# Patient Record
Sex: FEMALE | Race: AMERICAN INDIAN OR ALASKA NATIVE | Employment: OTHER | URBAN - METROPOLITAN AREA
[De-identification: names, ages, dates, MRNs, and addresses within clinical notes are randomized per-mention and may not be internally consistent; named-entity substitution may affect disease eponyms.]

---

## 2017-01-01 ENCOUNTER — OFFICE VISIT (OUTPATIENT)
Dept: CARDIOLOGY CLINIC | Age: 67
End: 2017-01-01

## 2017-01-01 DIAGNOSIS — Z95.810 PRESENCE OF AUTOMATIC CARDIOVERTER/DEFIBRILLATOR (AICD): Primary | ICD-10-CM

## 2017-01-01 RX ORDER — CARVEDILOL 6.25 MG/1
6.25 TABLET ORAL 2 TIMES DAILY WITH MEALS
Qty: 60 TAB | Refills: 6 | Status: SHIPPED | OUTPATIENT
Start: 2017-01-01 | End: 2018-01-01 | Stop reason: SDUPTHER

## 2017-01-01 RX ORDER — SERTRALINE HYDROCHLORIDE 50 MG/1
TABLET, FILM COATED ORAL
Qty: 90 TAB | Refills: 4 | Status: SHIPPED | OUTPATIENT
Start: 2017-01-01

## 2017-01-01 RX ORDER — SPIRONOLACTONE 25 MG/1
25 TABLET ORAL DAILY
Qty: 30 TAB | Refills: 6 | Status: CANCELLED | OUTPATIENT
Start: 2017-01-01

## 2017-01-20 ENCOUNTER — HOSPITAL ENCOUNTER (OUTPATIENT)
Dept: GENERAL RADIOLOGY | Age: 67
Discharge: HOME | End: 2017-01-20
Payer: MEDICARE

## 2017-01-20 ENCOUNTER — OFFICE VISIT (OUTPATIENT)
Dept: CARDIOLOGY CLINIC | Age: 67
End: 2017-01-20

## 2017-01-20 ENCOUNTER — CLINICAL SUPPORT (OUTPATIENT)
Dept: CARDIOLOGY CLINIC | Age: 67
End: 2017-01-20

## 2017-01-20 VITALS
WEIGHT: 177 LBS | BODY MASS INDEX: 32.57 KG/M2 | DIASTOLIC BLOOD PRESSURE: 60 MMHG | HEART RATE: 72 BPM | HEIGHT: 62 IN | SYSTOLIC BLOOD PRESSURE: 88 MMHG

## 2017-01-20 DIAGNOSIS — Z95.810 PRESENCE OF AUTOMATIC CARDIOVERTER/DEFIBRILLATOR (AICD): Primary | ICD-10-CM

## 2017-01-20 DIAGNOSIS — I42.8 NON-ISCHEMIC CARDIOMYOPATHY (HCC): ICD-10-CM

## 2017-01-20 DIAGNOSIS — I10 ESSENTIAL HYPERTENSION: ICD-10-CM

## 2017-01-20 DIAGNOSIS — I42.9 IDIOPATHIC CARDIOMYOPATHY (HCC): ICD-10-CM

## 2017-01-20 DIAGNOSIS — Z79.01 CHRONIC ANTICOAGULATION: ICD-10-CM

## 2017-01-20 DIAGNOSIS — T82.110A PACEMAKER LEAD MALFUNCTION, INITIAL ENCOUNTER: ICD-10-CM

## 2017-01-20 DIAGNOSIS — I44.7 LEFT BUNDLE BRANCH BLOCK (LBBB) ON ELECTROCARDIOGRAM: ICD-10-CM

## 2017-01-20 DIAGNOSIS — Z95.810 BIVENTRICULAR AUTOMATIC IMPLANTABLE CARDIOVERTER DEFIBRILLATOR IN SITU: Primary | ICD-10-CM

## 2017-01-20 DIAGNOSIS — Z79.899 ON AMIODARONE THERAPY: ICD-10-CM

## 2017-01-20 DIAGNOSIS — I48.0 PAF (PAROXYSMAL ATRIAL FIBRILLATION) (HCC): ICD-10-CM

## 2017-01-20 DIAGNOSIS — I50.22 CHF NYHA CLASS II (SYMPTOMS WITH MODERATELY STRENUOUS ACTIVITIES), CHRONIC, SYSTOLIC (HCC): ICD-10-CM

## 2017-01-20 PROCEDURE — 71020 XR CHEST PA LAT: CPT

## 2017-01-20 NOTE — PROGRESS NOTES
Subjective:       CARDIAC ELECTROPHYSIOLOGY CLINIC    Martine Olguin is a 73 y.o. female who is seen for evaluation of paroxysmal atrial fibrillation, chronic idiopathic cardiomyopathy EF 30% in the past but last year in 2/2015 she had abnormal nuclear stress test with EF 31% and echo EF 38% she had cardiac cath with Dr Toni López and she had normal coronary arteries and LVEF 25%. She had IVCD/LBBB type  She then had a new atrial lead removal (atrial lead noise known for a long time) and BIV ICD upgrade (new LV lead) 6/21/16. She says she has been doing well. No recent hospitalizations for CHF. No LE edema, abdominal bloating, chest pain or palpitations. She takes amiodarone, she admits she has not had a eye exam.   She takes xarelto for embolic CVA prevention.  She denies blood in the stool or urine      Cardiac testing   Cardiac cath 2/26/15 Normal coronaries, severely reduced LV function estimated EF 25%          Lexican cardiolite stress test 2/19/2015 showed atrial pacing, ventricular sensing with IVCD, diffuse nonspecific STT wave changes and PVCs  Myocardial perfusion images showed a large but partially reversible ischemia in anterior, anteroseptal and anterolateral walls consistent with ischemia in the LAD territory  LVEF 31%                           Problem List  Date Reviewed: 10/13/2016          Codes Class Noted    Pacemaker lead malfunction ICD-10-CM: T82.110A  ICD-9-CM: 996.01  6/21/2016        Chronic systolic congestive heart failure, NYHA class 3 ([facility] [address] ICD-10-CM: I50.22  ICD-9-CM: 428.22, 428.0  6/21/2016        Biventricular ICD (implantable cardioverter-defibrillator) in place ICD-10-CM: Z95.810  ICD-9-CM: V45.02  6/21/2016    Overview Addendum 6/21/2016  7:44 AM by Corina Noguera MD     Upgrade from ICD [facility] [employer]  Right atrial lead extraction and reimplant 6/21/2016             Left bundle branch block (LBBB) on electrocardiogram ICD-10-CM: I44.7  ICD-9-CM: 426.3  6/21/2016        Advanced care planning/counseling discussion ICD-10-CM: Z71.89  ICD-9-CM: V65.49  6/2/2016    Overview Signed 6/2/2016 10:06 AM by Jaime Bosch MD     Discussed naming a decision maker. Patient not able to make a decision at this time. Depression ICD-10-CM: F32.9  ICD-9-CM: 284  8/28/2015        Hypertension ICD-10-CM: I10  ICD-9-CM: 401.9  8/28/2015        History of amiodarone therapy ICD-10-CM: Z92.29  ICD-9-CM: V87.49  12/30/2011        Idi[facility] ICD-10-CM: I42.8  ICD-9-CM: 425.4  Unknown    Overview Addendum 11/22/2016  1:01 PM by Jaime Bosch MD     Non ischemic by cath 3/15. EF 21% 11/16 by echo             Automatic implantable cardiac defibrillator in situ ICD-10-CM: Z95.810  ICD-9-CM: V45.02  Unknown    Overview Addendum 7/30/2012  8:51 AM by Cortney Davila MD     ICD DFT test at 26 J converted VF to NSR successfully (she is on Amiodarone) 5/13/2011  Lead noises with isometric arm activity. Fluoroscopy 7/30/2012 does not show fracture of disconnection             Paroxysmal ventricular tachycardia (HCC) ICD-10-CM: I47.2  ICD-9-CM: 427.1  Unknown        PAF (paroxysmal atrial fibrillation) ([facility] ICD-10-CM: I48.0  ICD-9-CM: 427.31  Unknown    Overview Addendum 4/2/2011  3:42 PM by Cortney Davila MD     ICD shock 3/28/2011 for rapid Afib  ICD shock 6/3/2010  (ICD with ATP for VT in the past)                 Current Outpatient Prescriptions   Medication Sig    losartan (COZAAR) 25 mg tablet Take 1 Tab by mouth daily.  carvedilol (COREG) 6.25 mg tablet Take 1 Tab by mouth two (2) times daily (with meals).  furosemide (LASIX) 20 mg tablet TAKE 1 TABLET BY MOUTH DAILY    amiodarone (CORDARONE) 200 mg tablet Take 1 Tab by mouth daily.  spironolactone (ALDACTONE) 25 mg tablet Take 1 Tab by mouth daily.  Indications: CHRONIC HEART FAILURE    sertraline (ZOLOFT) 50 mg tablet TAKE 1 TABLET BY MOUTH DAILY    XARELTO 20 mg tab tablet TAKE 1 TABLET BY MOUTH EVERY DAY     No current facility-administered medications for this visit. No Known Allergies  Past Medical History   Diagnosis Date    Congestive heart failure, unspecified     Cardiomyopathy, nonischemic     Atrial fibrillation     ICD (implantable cardiac defibrillator) in place 9/18/09     [facility] DR Kenya Tovar History of amiodarone therapy 12/30/2011    ICD (implantable cardioverter-defibrillator) in place      Past Surgical History   Procedure Laterality Date    Hx heart catheterization  2006     no CAD, EF 25%    Echo 2d adult  4/1/2011     EF 30%, mild to moderate LA enlargement, mild MR    Ir fluoroscopy < 1 hour  7/30/2012          Hx pacemaker placement  2009    Hx pacemaker         History   Substance Use Topics    Smoking status: Never Smoker     Smokeless tobacco: Never Used    Alcohol Use: 2.5 oz/week     5 Cans of beer per week      Comment: occasional      Review of Systems    Constitutional: Negative for fever, chills, weight loss, malaise/fatigue  HEENT: Negative for nosebleeds, and vision changes. + runny nose  Respiratory: Negative for cough, hemoptysis, sputum production, and wheezing. Cardiovascular: Negative for chest pain, palpitations, leg swelling, no syncope, and PND. Gastrointestinal: Negative for nausea, vomiting, blood in stool and melena. Genitourinary: Negative for hematuria. Musculoskeletal: Negative for myalgias. Skin: Negative for rash and itching. Heme: Does not bleed or bruise easily.    Neurological: Negative for speech change and focal weakness      Objective:     Visit Vitals    BP (!) 88/60 (BP 1 Location: Right arm, BP Patient Position: Sitting)    Pulse 72    Ht 5' 2\" (1.575 m)    Wt 177 lb (80.3 kg)    BMI 32.37 kg/m2       Physical Exam:   General:  alert, cooperative, no distress, appears stated age   Neck: carotids upstroke normal bilaterally, no bruits, no JVD   Chest Wall: inspection showed normal left sided BIV ICD site   Lung: clear to auscultation bilaterally   Heart:  normal rate, regular rhythm,no rubs, clicks or gallops   Abdomen: soft, mild obesity. Extremities: no edema   Skin: left sided biventircular ICD site unremarkable     ECG atrial sensing and ventricular pacing    Assessment/Plan:       ICD-10-CM ICD-9-CM    1. Biventricular automatic implantable cardioverter defibrillator in situ Z95.810 V45.02    2. PAF (paroxysmal atrial fibrillation) ([facility]) I48.0 427.31 AMB POC EKG ROUTINE W/ 12 LEADS, INTER & REP      XR CHEST PA LAT   3. Idiopathic cardiomyopathy ([facility]) I42.8 425.4 AMB POC EKG ROUTINE W/ 12 LEADS, INTER & REP      XR CHEST PA LAT   4. Essential hypertension I10 401.9 AMB POC EKG ROUTINE W/ 12 LEADS, INTER & REP      XR CHEST PA LAT   5. Left bundle branch block (LBBB) on electrocardiogram I44.7 426.3    6. On amiodarone therapy Z79.899 V58.69    7. Chronic anticoagulation Z79.01 V58.61    8. Non-ischemic cardiomyopathy ([facility]) I42.9 425.4    9. CHF NYHA class II (symptoms with moderately strenuous activities), chronic, systolic ([facility]) O74.56 260.19      428.0    s/p upgrade to biventricular ICD. no acute s/S of CHF. She is on GDMT, BP low but not dizzy  She is tolerating xarelto without bleeding side effects. She is on amiodarone and tolerating without side effects. TSh normal  LFTs normal  Echo with still depressed LVEF  Chest Xray 6/2016 no evidence of pulmonary fibrosis. Will need to repeat today but also since atrial lead measures small P waves and has high pacing threshold 4.5 V @ 0.5 ms will check RA lead position and I reprogrammed BIV ICD to VDD pacing mode  She will make an eye appointment annually  Reviewed medications and side effects in detail with her  Call me if she has dizziness       Follow-up Disposition:  Return in about 6 months (around 7/20/2017). Julian Shelton M.D.  [facility]  Electrophysiology/Cardiology  [facility] [place]  [address], [phone] [facility] Fabienne Calderon, [address] [place] [address]  [phone]

## 2017-01-20 NOTE — PROGRESS NOTES
Chief Complaint   Patient presents with    Cardiomyopathy    CHF    Irregular Heart Beat     a-fib    Follow-up     3 month follow up     Verified medications with the patient.

## 2017-01-20 NOTE — PATIENT INSTRUCTIONS
Have chest x-ray done to evaluate your amiodarone use, atrial lead position. Follow up with Dr. Heron Hearn in 6 months.

## 2017-01-23 ENCOUNTER — TELEPHONE (OUTPATIENT)
Dept: CARDIOLOGY CLINIC | Age: 67
End: 2017-01-23

## 2017-01-23 NOTE — TELEPHONE ENCOUNTER
Wanted to know that results of her chest x-ray. Notified her that I would contact her once Dr Ruthie Esteban reviewed her results.

## 2017-01-25 ENCOUNTER — TELEPHONE (OUTPATIENT)
Dept: CARDIOLOGY CLINIC | Age: 67
End: 2017-01-25

## 2017-01-25 DIAGNOSIS — I42.9 IDIOPATHIC CARDIOMYOPATHY (HCC): Primary | ICD-10-CM

## 2017-01-25 DIAGNOSIS — I48.0 PAF (PAROXYSMAL ATRIAL FIBRILLATION) (HCC): ICD-10-CM

## 2017-01-25 DIAGNOSIS — T82.110A PACEMAKER LEAD MALFUNCTION, INITIAL ENCOUNTER: ICD-10-CM

## 2017-01-25 NOTE — PROGRESS NOTES
New right atrial lead placed in June 2016 is dislodged and this has been 7 months from implant  Please ask patient to come in for treadmill test to see if she has chronotropic incompetence and if so will need to reposition the atrial lead

## 2017-01-25 NOTE — TELEPHONE ENCOUNTER
Verified patient with two types of identifiers. Notified wife and  of results. PSR to call back and schedule treadmill test to check for CI. Patient verbalizes understanding. And will call with any questions or concerns.

## 2017-01-25 NOTE — TELEPHONE ENCOUNTER
----- Message from Navya Bianchi MD sent at 1/25/2017  8:31 AM EST -----  New right atrial lead placed in June 2016 is dislodged and this has been 7 months from implant  Please ask patient to come in for treadmill test to see if she has chronotropic incompetence and if so will need to reposition the atrial lead

## 2017-02-06 ENCOUNTER — DOCUMENTATION ONLY (OUTPATIENT)
Dept: CARDIOLOGY CLINIC | Age: 67
End: 2017-02-06

## 2017-02-06 ENCOUNTER — CLINICAL SUPPORT (OUTPATIENT)
Dept: CARDIOLOGY CLINIC | Age: 67
End: 2017-02-06

## 2017-02-06 DIAGNOSIS — I48.0 PAF (PAROXYSMAL ATRIAL FIBRILLATION) (HCC): ICD-10-CM

## 2017-02-06 DIAGNOSIS — I42.9 IDIOPATHIC CARDIOMYOPATHY (HCC): ICD-10-CM

## 2017-02-06 DIAGNOSIS — T82.110A PACEMAKER LEAD MALFUNCTION, INITIAL ENCOUNTER: ICD-10-CM

## 2017-02-06 DIAGNOSIS — I47.29 PAROXYSMAL VENTRICULAR TACHYCARDIA: ICD-10-CM

## 2017-02-06 NOTE — PROGRESS NOTES
RA lead was dislodged on xray  Stress test showed appropriate sinus response but she had PVCs and 4 beat NSVT.     I recommend to reposition RA lead before it falls into RV

## 2017-02-07 ENCOUNTER — TELEPHONE (OUTPATIENT)
Dept: CARDIOLOGY CLINIC | Age: 67
End: 2017-02-07

## 2017-02-07 DIAGNOSIS — I47.29 PAROXYSMAL VENTRICULAR TACHYCARDIA: ICD-10-CM

## 2017-02-07 DIAGNOSIS — I48.0 PAF (PAROXYSMAL ATRIAL FIBRILLATION) (HCC): ICD-10-CM

## 2017-02-07 DIAGNOSIS — I10 ESSENTIAL HYPERTENSION: ICD-10-CM

## 2017-02-07 DIAGNOSIS — I42.9 IDIOPATHIC CARDIOMYOPATHY (HCC): Primary | ICD-10-CM

## 2017-02-07 NOTE — PROGRESS NOTES
Procedure scheduled for 2/22/17 at 1:00pm.  Patient has a pending appt 2/9/17 with Dr Jodie Sneed. Will give prep and info at appt.

## 2017-02-07 NOTE — LETTER
2/7/2017 9:10 AM 
 
Ms. Quin Garcia [address] [facility] [address] [place] [address] Your lead repositoining procedure has been scheduled for 2/22/17 at 1:00pm, at [facility]. 
 
Please report to Admitting Department by 11:00am, or 2 hours prior to your scheduled procedure. Please bring a list of your current medications and medication bottles, if able, to the hospital on this day. You will need to have nothing to eat or drink after midnight, the night prior to your procedure. You may have small sips of water, if needed, to take with your medication. You will need labs drawn prior to your procedure. Please go to [facility] to have this done as soon as your able. After your procedure, you will need to follow up with Dr. Birt Saint.  Your follow-up appointment has been scheduled for 3/10/17 at 9:45am.  
 
 
 
 
 
 
 
 
Sincerely, 
 
 
Desmond Munguia MD

## 2017-02-09 ENCOUNTER — OFFICE VISIT (OUTPATIENT)
Dept: CARDIOLOGY CLINIC | Age: 67
End: 2017-02-09

## 2017-02-09 VITALS
BODY MASS INDEX: 32.76 KG/M2 | DIASTOLIC BLOOD PRESSURE: 60 MMHG | WEIGHT: 178 LBS | HEART RATE: 60 BPM | HEIGHT: 62 IN | SYSTOLIC BLOOD PRESSURE: 100 MMHG

## 2017-02-09 DIAGNOSIS — T82.120A ATRIAL PACEMAKER LEAD DISPLACEMENT, INITIAL ENCOUNTER: Primary | ICD-10-CM

## 2017-02-09 DIAGNOSIS — I42.9 IDIOPATHIC CARDIOMYOPATHY (HCC): ICD-10-CM

## 2017-02-09 DIAGNOSIS — I48.0 PAF (PAROXYSMAL ATRIAL FIBRILLATION) (HCC): ICD-10-CM

## 2017-02-09 DIAGNOSIS — Z95.810 BIVENTRICULAR AUTOMATIC IMPLANTABLE CARDIOVERTER DEFIBRILLATOR IN SITU: ICD-10-CM

## 2017-02-09 NOTE — MR AVS SNAPSHOT
Visit Information Date & Time Provider Department Dept. Phone Encounter #  
 2/9/2017  9:40 AM Kiley Salazar MD [facility] Doreen Dennis [phone] [id number] Your Appointments 2/23/2017  8:15 AM  
ROUTINE CARE with Darshan Mitchell MD  
[address] ([address]) Appt Note: checkup [address], [address] [id number]  
[address], [place] [address] [address]  
  
    
 3/10/2017  9:45 AM  
PACEMAKER with PrimTyler Bond [facility] (ANTHONY SCHEDULING) Appt Note: 2 week wound check from RA lead repo  
 [address] [facility] [address]  
[address]  
  
    
 3/10/2017 10:00 AM  
ESTABLISHED PATIENT with Kiley Salazar MD  
[facility] ([address] [place] [address]) Appt Note: 2 week wound check from RA lead repo  
 [address] [facility] [address]  
[address]  
  
    
 7/14/2017  9:20 AM  
ESTABLISHED PATIENT with Kiley Salazar MD  
[facility] ([address] [place] [address] Road) Appt Note: 6 month f/u per Dr. Nanette Wild [facility] [phone]  
[phone]  
  
    
 2/9/2018 10:15 AM  
PACEMAKER with IQZTyler MCKEON [facility] (ANTHONY SCHEDULING) Appt Note: bsc icd, rc, annual thresh/Lat  see Kingsley Thurman, $0CP  martinez 1/20/17  
 [address]  
[phone]  
  
    
 2/9/2018 10:20 AM  
ESTABLISHED PATIENT with Kiley Salazar MD  
[facility] ([address] [place] [address] Road) Appt Note: bsc icd, rc, annual thresh/Lat  see Kingsley Thurman, $0CP  martinez 1/20/17  
 [address]  
[phone]  
  
    
  
 4/25/2017 10:30 AM  
REMOTE OFFICE VISIT with Bland Carrel  
 [facility] (ANTHONY SCHEDULING) Appt Note: [facility] icd, rc b 1/20/17  
 [address] [address]  
[address] [address]  
  
    
 8/1/2017  9:30 AM  
REMOTE OFFICE VISIT with Jyotsna Ta [facility] (ANTHONY SCHEDULING) Appt Note: bs icd, rc  
 [address] [address], [place]  
[phone]  
  
    
 11/7/2017  8:00 AM  
REMOTE OFFICE VISIT with Jyotsna Ta [facility] ([place] SCHEDULING) Appt Note: [facility] icd, rc  
 [address] [address], [place]  
[phone] Upcoming Health Maintenance Date Due DTaP/Tdap/Td series (1 - Tdap) 3/21/1971 GLAUCOMA SCREENING Q2Y 3/21/2016 OSTEOPOROSIS SCREENING (DEXA) 3/21/2016 INFLUENZA AGE 9 TO ADULT 8/1/2016 FOBT Q 1 YEAR AGE 50-75 3/16/2017 BREAST CANCER SCRN MAMMOGRAM 6/9/2017 Pneumococcal 65+ Low/Medium Risk (2 of 2 - PCV13) 6/2/2017 MEDICARE YEARLY EXAM 6/3/2017 Allergies as of 2/9/2017  Review Complete On: 2/9/2017 By: Pavel Barger LPN No Known Allergies Current Immunizations  Never Reviewed Name Date Pneumococcal Polysaccharide (PPSV-23) 6/2/2016 Not reviewed this visit Vitals BP Pulse Height(growth percentile) Weight(growth percentile) BMI OB Status 100/60 (BP 1 Location: Right arm, BP Patient Position: Sitting) 60 5' 2\" (1.575 m) 178 lb (80.7 kg) 32.56 kg/m2 Postmenopausal  
 Smoking Status Never Smoker Vitals History BMI and BSA Data Body Mass Index Body Surface Area 32.56 kg/m 2 1.88 m 2 Preferred Pharmacy Pharmacy Name Phone [facility] [address] [phone] Your Updated Medication List  
  
   
This list is accurate as of: 2/9/17 10:18 AM.  Always use your most recent med list.  
  
  
  
  
 amiodarone 200 mg tablet Commonly known as:  CORDARONE Take 1 Tab by mouth daily. carvedilol 6.25 mg tablet Commonly known as:  Gillermina Begun Take 1 Tab by mouth two (2) times daily (with meals). furosemide 20 mg tablet Commonly known as:  LASIX TAKE 1 TABLET BY MOUTH DAILY losartan 25 mg tablet Commonly known as:  COZAAR Take 1 Tab by mouth daily. sertraline 50 mg tablet Commonly known as:  ZOLOFT  
TAKE 1 TABLET BY MOUTH DAILY  
  
 spironolactone 25 mg tablet Commonly known as:  ALDACTONE Take 1 Tab by mouth daily. Indications: CHRONIC HEART FAILURE  
  
 XARELTO 20 mg Tab tablet Generic drug:  rivaroxaban TAKE 1 TABLET BY MOUTH EVERY DAY To-Do List   
 02/22/2017 1:15 PM  
  Appointment with CATH ROOM 3 [facility] at [address] [place] [address] ([phone]) NPO AFTER MIDNIGHT! ROUTINE CASES:  Please arrive 2 hour prior to your scheduled appointment time. If your scheduled appointment is for 0730, 0800, 0815, please arrive by 0645. NON ROUTINE CASES:  PATIENTS WHO REQUIRE LABS, X-RAY, EKG, or MEDS:  PLEASE ARRIVE 3 HOURS PRIOR TO YOUR SCHEDULED APPOINTMENT. If you require hydration prior to your procedure, PLEASE ARRIVE 4 HOURS PRIOR TO YOUR APPOINTMENT  **** IT IS THE OFFICE SCHEDULARS RESPONSBILITY TO NOTIFY THE CATH LAB SCHEDULAR IF THE PATIENT WILL REQUIRE ANY ADDITIONAL TIME FOR PREP FROM ROUTINE CASE ***** Patient Instructions Continue with dental procedure for tomorrow. Proceed with RA lead replacement, hold Xarelto 2 days prior to procedure. Introducing [facility] & HEALTH SERVICES! [facility] introduces [employer] patient portal. Now you can access parts of your medical record, email your doctor's office, and request medication refills online. 1. In your internet browser, go to [email or web address] 2. Click on the First Time User? Click Here link in the Sign In box. You will see the New Member Sign Up page. 3. Enter your [employer] Access Code exactly as it appears below. You will not need to use this code after youve completed the sign-up process. If you do not sign up before the expiration date, you must request a new code. · [employer] Access Code: [email or web address] Expires: 4/20/2017 10:29 AM 
 
4. Enter the last four digits of your Social Security Number (xxxx) and Date of Birth (mm/dd/yyyy) as indicated and click Submit. You will be taken to the next sign-up page. 5. Create a [employer] ID. This will be your [employer] login ID and cannot be changed, so think of one that is secure and easy to remember. 6. Create a [employer] password. You can change your password at any time. 7. Enter your Password Reset Question and Answer. This can be used at a later time if you forget your password. 8. Enter your e-mail address. You will receive e-mail notification when new information is available in [address] 9. Click Sign Up. You can now view and download portions of your medical record. 10. Click the Download Summary menu link to download a portable copy of your medical information. If you have questions, please visit the Frequently Asked Questions section of the [employer] website. Remember, [employer] is NOT to be used for urgent needs. For medical emergencies, dial 911. Now available from your iPhone and Android! Please provide this summary of care documentation to your next provider. Your primary care clinician is listed as Gila Snell. If you have any questions after today's visit, please call [phone].

## 2017-02-09 NOTE — PATIENT INSTRUCTIONS
Continue with dental procedure for tomorrow. Proceed with RA lead replacement, hold Xarelto 2 days prior to procedure.

## 2017-02-09 NOTE — PROGRESS NOTES
Chief Complaint   Patient presents with    Cardiomyopathy    Irregular Heart Beat    Pre-op Exam     Verified medications with the patient. Verified pharmacy with patient.

## 2017-02-09 NOTE — PROGRESS NOTES
Subjective:       CARDIAC ELECTROPHYSIOLOGY CLINIC    Eber Abel is a 73 y.o. female who is seen for evaluation of right atrial pacing lead  RA lead was dislodged on xray  Stress test showed appropriate sinus response but she had PVCs and 4 beat NSVT. She has paroxysmal atrial fibrillation, chronic idiopathic cardiomyopathy EF 30% in the past but last year in 2/2015 she had abnormal nuclear stress test with EF 31% and echo EF 38% she had cardiac cath with Dr Cy Calderon and she had normal coronary arteries and LVEF 25%. She had IVCD/LBBB type  She then had a new atrial lead removal (atrial lead noise known for a long time) and BIV ICD upgrade (new LV lead) 6/21/16. She says she has been doing well. No recent hospitalizations for CHF. No LE edema, abdominal bloating, chest pain or palpitations. She takes amiodarone, she admits she has not had a eye exam.   She takes xarelto for embolic CVA prevention.  She denies blood in the stool or urine      Cardiac testing   Cardiac cath 2/26/15 Normal coronaries, severely reduced LV function estimated EF 25%          [facility] cardiolite stress test 2/19/2015 showed atrial pacing, ventricular sensing with IVCD, diffuse nonspecific STT wave changes and PVCs  Myocardial perfusion images showed a large but partially reversible ischemia in anterior, anteroseptal and anterolateral walls consistent with ischemia in the LAD territory  LVEF 31%                           Problem List  Date Reviewed: 2/9/2017          Codes Class Noted    Pacemaker lead malfunction ICD-10-CM: T82.110A  ICD-9-CM: 996.01  6/21/2016        Chronic systolic congestive heart failure, NYHA class 3 ([facility]) ICD-10-CM: I50.22  ICD-9-CM: 428.22, 428.0  6/21/2016        Biventricular ICD (implantable cardioverter-defibrillator) in place ICD-10-CM: Z95.810  ICD-9-CM: V45.02  6/21/2016    Overview Addendum 6/21/2016  7:44 AM by Cortney Davila MD     Upgrade from ICD [employer]  Right atrial lead extraction and reimplant 6/21/2016             Left bundle branch block (LBBB) on electrocardiogram ICD-10-CM: I44.7  ICD-9-CM: 426.3  6/21/2016        Advanced care planning/counseling discussion ICD-10-CM: Z71.89  ICD-9-CM: V65.49  6/2/2016    Overview Signed 6/2/2016 10:06 AM by Esteban Akhtar MD     Discussed naming a decision maker. Patient not able to make a decision at this time. Depression ICD-10-CM: F32.9  ICD-9-CM: 842  8/28/2015        Hypertension ICD-10-CM: I10  ICD-9-CM: 401.9  8/28/2015        History of amiodarone therapy ICD-10-CM: Z92.29  ICD-9-CM: V87.49  12/30/2011        Idiopathic cardiomyopathy (HCC) ICD-10-CM: I42.8  ICD-9-CM: 425.4  Unknown    Overview Addendum 2/7/2017  9:50 AM by Chapo Resendez NP     Non ischemic by cath 3/15. EF 21% 11/16 by echo  2/6/17 Stress test showed appropriate sinus response but she had PVCs and 4 beat NSVT. Automatic implantable cardiac defibrillator in situ ICD-10-CM: Z95.810  ICD-9-CM: V45.02  Unknown    Overview Addendum 7/30/2012  8:51 AM by Navya Bianchi MD     ICD DFT test at 26 J converted VF to NSR successfully (she is on Amiodarone) 5/13/2011  Lead noises with isometric arm activity. Fluoroscopy 7/30/2012 does not show fracture of disconnection             Paroxysmal ventricular tachycardia (HCC) ICD-10-CM: I47.2  ICD-9-CM: 427.1  Unknown        PAF (paroxysmal atrial fibrillation) ([facility] [address] ICD-10-CM: I48.0  ICD-9-CM: 427.31  Unknown    Overview Addendum 4/2/2011  3:42 PM by Navya Bianchi MD     ICD shock 3/28/2011 for rapid Afib  ICD shock 6/3/2010  (ICD with ATP for VT in the past)                 Current Outpatient Prescriptions   Medication Sig    losartan (COZAAR) 25 mg tablet Take 1 Tab by mouth daily.  carvedilol (COREG) 6.25 mg tablet Take 1 Tab by mouth two (2) times daily (with meals).  furosemide (LASIX) 20 mg tablet TAKE 1 TABLET BY MOUTH DAILY    amiodarone (CORDARONE) 200 mg tablet Take 1 Tab by mouth daily.     spironolactone (ALDACTONE) 25 mg tablet Take 1 Tab by mouth daily. Indications: CHRONIC HEART FAILURE    sertraline (ZOLOFT) 50 mg tablet TAKE 1 TABLET BY MOUTH DAILY    XARELTO 20 mg tab tablet TAKE 1 TABLET BY MOUTH EVERY DAY     No current facility-administered medications for this visit. No Known Allergies  Past Medical History   Diagnosis Date    Congestive heart failure, unspecified     Cardiomyopathy, nonischemic     Atrial fibrillation     ICD (implantable cardiac defibrillator) in place 9/18/09     [facility] DR Camilla Beltran History of amiodarone therapy 12/30/2011    ICD (implantable cardioverter-defibrillator) in place      Past Surgical History   Procedure Laterality Date    Hx heart catheterization  2006     no CAD, EF 25%    Echo 2d adult  4/1/2011     EF 30%, mild to moderate LA enlargement, mild MR    Ir fluoroscopy < 1 hour  7/30/2012          Hx pacemaker placement  2009    Hx pacemaker         History   Substance Use Topics    Smoking status: Never Smoker     Smokeless tobacco: Never Used    Alcohol Use: 2.5 oz/week     5 Cans of beer per week      Comment: occasional      Review of Systems    Constitutional: Negative for fever, chills, weight loss, malaise/fatigue  HEENT: Negative for nosebleeds, and vision changes. Tooth extraction coming up  Respiratory: Negative for cough, hemoptysis, sputum production, and wheezing. Cardiovascular: Negative for chest pain, palpitations, leg swelling, no syncope, and PND. Gastrointestinal: Negative for nausea, vomiting, blood in stool and melena. Genitourinary: Negative for hematuria. Musculoskeletal: Negative for myalgias. Skin: Negative for rash and itching. Heme: Does not bleed or bruise easily.    Neurological: Negative for speech change and focal weakness      Objective:     Visit Vitals    /60 (BP 1 Location: Right arm, BP Patient Position: Sitting)    Pulse 60    Ht 5' 2\" (1.575 m)    Wt 178 lb (80.7 kg)  BMI 32.56 kg/m2       Physical Exam:   General:  alert, cooperative, no distress, appears stated age   Neck: carotids upstroke normal bilaterally, no bruits, no JVD   Chest Wall: inspection showed normal left sided BIV ICD site   Lung: clear to auscultation bilaterally   Heart:  normal rate, regular rhythm,no rubs, clicks or gallops   Abdomen: soft, mild obesity. Extremities: no edema   Skin: left sided biventircular ICD site unremarkable, thin scar          Assessment/Plan:       ICD-10-CM ICD-9-CM    1. Atrial pacemaker lead displacement, initial encounter T82.120A 996.01    2. PAF (paroxysmal atrial fibrillation) ([facility]) I48.0 427.31    3. Idiopathic cardiomyopathy ([facility]) I42.8 425.4    4. Biventricular automatic implantable cardioverter defibrillator in situ Z95.810 V45.02    RA lead dislodgment. 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 She is on GDMT, BP low usually  She is tolerating xarelto without bleeding side effects. She will hold for tooth extraction and procedure  She is on amiodarone and tolerating without side effects. TSh normal  LFTs normal  Echo with still depressed LVEF  Chest Xray 6/2016 no evidence of pulmonary fibrosis. BIV ICD with VDD pacing mode at this time  She will make an eye appointment annually     Follow-up Disposition:  Return in about 4 weeks (around 3/9/2017). Wolfgang Merlin, M.D.  [facility] [place]  Electrophysiology/Cardiology  [facility] [place]  [address], [address] , [address]  [address]  [phone]

## 2017-02-10 LAB
BASOPHILS # BLD AUTO: 0.1 X10E3/UL (ref 0–0.2)
BASOPHILS NFR BLD AUTO: 1 %
BUN SERPL-MCNC: 16 MG/DL (ref 8–27)
BUN/CREAT SERPL: 22 (ref 11–26)
CALCIUM SERPL-MCNC: 9.5 MG/DL (ref 8.7–10.3)
CHLORIDE SERPL-SCNC: 101 MMOL/L (ref 96–106)
CO2 SERPL-SCNC: 24 MMOL/L (ref 18–29)
CREAT SERPL-MCNC: 0.73 MG/DL (ref 0.57–1)
EOSINOPHIL # BLD AUTO: 0.1 X10E3/UL (ref 0–0.4)
EOSINOPHIL NFR BLD AUTO: 1 %
ERYTHROCYTE [DISTWIDTH] IN BLOOD BY AUTOMATED COUNT: 13.4 % (ref 12.3–15.4)
GLUCOSE SERPL-MCNC: 140 MG/DL (ref 65–99)
HCT VFR BLD AUTO: 36.7 % (ref 34–46.6)
HGB BLD-MCNC: 12.2 G/DL (ref 11.1–15.9)
IMM GRANULOCYTES # BLD: 0 X10E3/UL (ref 0–0.1)
IMM GRANULOCYTES NFR BLD: 0 %
LYMPHOCYTES # BLD AUTO: 2.6 X10E3/UL (ref 0.7–3.1)
LYMPHOCYTES NFR BLD AUTO: 38 %
MCH RBC QN AUTO: 32.8 PG (ref 26.6–33)
MCHC RBC AUTO-ENTMCNC: 33.2 G/DL (ref 31.5–35.7)
MCV RBC AUTO: 99 FL (ref 79–97)
MONOCYTES # BLD AUTO: 0.5 X10E3/UL (ref 0.1–0.9)
MONOCYTES NFR BLD AUTO: 8 %
NEUTROPHILS # BLD AUTO: 3.6 X10E3/UL (ref 1.4–7)
NEUTROPHILS NFR BLD AUTO: 52 %
PLATELET # BLD AUTO: 306 X10E3/UL (ref 150–379)
POTASSIUM SERPL-SCNC: 3.8 MMOL/L (ref 3.5–5.2)
RBC # BLD AUTO: 3.72 X10E6/UL (ref 3.77–5.28)
SODIUM SERPL-SCNC: 141 MMOL/L (ref 134–144)
WBC # BLD AUTO: 6.9 X10E3/UL (ref 3.4–10.8)

## 2017-02-13 ENCOUNTER — OFFICE VISIT (OUTPATIENT)
Dept: FAMILY MEDICINE CLINIC | Age: 67
End: 2017-02-13

## 2017-02-13 VITALS
SYSTOLIC BLOOD PRESSURE: 98 MMHG | BODY MASS INDEX: 32.57 KG/M2 | RESPIRATION RATE: 19 BRPM | TEMPERATURE: 98.1 F | HEART RATE: 70 BPM | WEIGHT: 177 LBS | OXYGEN SATURATION: 98 % | HEIGHT: 62 IN | DIASTOLIC BLOOD PRESSURE: 63 MMHG

## 2017-02-13 DIAGNOSIS — T82.110A PACEMAKER LEAD MALFUNCTION, INITIAL ENCOUNTER: ICD-10-CM

## 2017-02-13 DIAGNOSIS — Z12.31 ENCOUNTER FOR MAMMOGRAM TO ESTABLISH BASELINE MAMMOGRAM: ICD-10-CM

## 2017-02-13 DIAGNOSIS — Z92.29 HISTORY OF AMIODARONE THERAPY: ICD-10-CM

## 2017-02-13 DIAGNOSIS — I50.22 CHRONIC SYSTOLIC CONGESTIVE HEART FAILURE, NYHA CLASS 3 (HCC): ICD-10-CM

## 2017-02-13 DIAGNOSIS — Z12.11 COLON CANCER SCREENING: ICD-10-CM

## 2017-02-13 DIAGNOSIS — F32.A DEPRESSION, UNSPECIFIED DEPRESSION TYPE: Primary | ICD-10-CM

## 2017-02-13 DIAGNOSIS — I42.9 IDIOPATHIC CARDIOMYOPATHY (HCC): ICD-10-CM

## 2017-02-13 DIAGNOSIS — M81.0 POSTMENOPAUSAL BONE LOSS: ICD-10-CM

## 2017-02-13 NOTE — PROGRESS NOTES
HPI:  73 y.o.  presents for follow up appointment. No hospital, ER or specialist visits since last primary care visit except as noted below. Cardiomyopathy and arrhythmia - followed by Dr. Greg Lou, has to go for lead replacement next week because RA lead has become dislodged. Patient with no symptoms of YANEZ, no orthopnea, no PND, no LE edema. On xarelto and amiodarone with stable labs. Active around the farm. Depression - taking sertraline daily and well controlled.   PHQ 2 / 9, over the last two weeks 2/13/2017   Little interest or pleasure in doing things Not at all   Feeling down, depressed or hopeless Not at all   Total Score PHQ 2 0   Trouble falling or staying asleep, or sleeping too much Not at all   Feeling tired or having little energy Not at all   Poor appetite or overeating Not at all   Feeling bad about yourself - or that you are a failure or have let yourself or your family down Not at all   Trouble concentrating on things such as school, work, reading or watching TV Not at all   Moving or speaking so slowly that other people could have noticed; or the opposite being so fidgety that others notice Not at all   Thoughts of being better off dead, or hurting yourself in some way Not at all   PHQ 9 Score 0   How difficult have these problems made it for you to do your work, take care of your home and get along with others Not difficult at all           Past Medical History   Diagnosis Date    Atrial fibrillation ([facility] [address]     Cardiomyopathy, nonischemic ([address]     Congestive heart failure, unspecified     Depression     History of amiodarone therapy 12/30/2011    ICD (implantable cardiac defibrillator) in place 9/18/09     [facility]        Social History   Substance Use Topics    Smoking status: Never Smoker    Smokeless tobacco: Never Used    Alcohol use 2.5 oz/week     5 Cans of beer per week      Comment: occasional       Outpatient Prescriptions Marked as Taking for the 2/13/17 encounter (Office Visit) with Thomas Angel MD   Medication Sig Dispense Refill    losartan (COZAAR) 25 mg tablet Take 1 Tab by mouth daily. 90 Tab 1    carvedilol (COREG) 6.25 mg tablet Take 1 Tab by mouth two (2) times daily (with meals). 60 Tab 6    furosemide (LASIX) 20 mg tablet TAKE 1 TABLET BY MOUTH DAILY 90 Tab 1    amiodarone (CORDARONE) 200 mg tablet Take 1 Tab by mouth daily. 90 Tab 1    spironolactone (ALDACTONE) 25 mg tablet Take 1 Tab by mouth daily. Indications: CHRONIC HEART FAILURE 30 Tab 12    sertraline (ZOLOFT) 50 mg tablet TAKE 1 TABLET BY MOUTH DAILY 90 Tab 4    XARELTO 20 mg tab tablet TAKE 1 TABLET BY MOUTH EVERY DAY 30 Tab 11       No Known Allergies    ROS:  ROS negative except as per HPI. PE:  Visit Vitals    BP 98/63 (BP 1 Location: Left arm, BP Patient Position: Sitting)    Pulse 70    Temp 98.1 °F (36.7 °C) (Oral)    Resp 19    Ht 5' 2\" (1.575 m)    Wt 177 lb (80.3 kg)    SpO2 98%    BMI 32.37 kg/m2     Gen: alert, oriented, no acute distress  Head: normocephalic, atraumatic  Ears: external auditory canals clear, TMs without erythema or effusion  Eyes: pupils equal round reactive to light, sclera clear, conjunctiva clear  Oral: moist mucus membranes, no oral lesions, no pharyngeal inflammation or exudate  Neck: symmetric normal sized thyroid, no carotid bruits, no jugular vein distention  Resp: no increase work of breathing, lungs clear to ausculation bilaterally, no wheezing, rales or rhonchi  CV: S1, S2 normal.  No murmurs, rubs, or gallops. Abd: soft, not tender, not distended. No hepatosplenomegaly. Normal bowel sounds. No hernias. No abdominal or renal bruits. Neuro: cranial nerves intact, normal strength and movement in all extremities, reflexes and sensation intact and symmetric. Skin: no lesion or rash  Extremities: no cyanosis or edema      Assessment/Plan:      ICD-10-CM ICD-9-CM    1.  Depression, unspecified depression type - well controlled on current medications. F32.9 311    2. Colon cancer screening Z12.11 V76.51 OCCULT BLOOD, IMMUNOASSAY (FIT)   3. Encounter for mammogram to establish baseline mammogram - will schedul after heart procedure Z12.31 V76.12 DEBBI 3D FITZ W MAMMO BI SCREENING INCL CAD   4. Postmenopausal bone loss - schedule after heart procedure M81.0 733.01 DEXA BONE DENSITY STUDY AXIAL   5. Chronic systolic congestive heart failure, NYHA class 3 ([facility] [address] - well compensated on current medications. I50.22 428.22      428.0    6. Idiopathic cardiomyopathy (HCC) I42.8 425.4    7. Pacemaker lead malfunction, initial encounter - revision next week T82.110A 996.01    8. History of amiodarone therapy - labs stable. Z92.29 V87.49        There are no discontinued medications. Health Maintenance reviewed - updated. Current Outpatient Prescriptions   Medication Sig    losartan (COZAAR) 25 mg tablet Take 1 Tab by mouth daily.  carvedilol (COREG) 6.25 mg tablet Take 1 Tab by mouth two (2) times daily (with meals).  furosemide (LASIX) 20 mg tablet TAKE 1 TABLET BY MOUTH DAILY    amiodarone (CORDARONE) 200 mg tablet Take 1 Tab by mouth daily.  spironolactone (ALDACTONE) 25 mg tablet Take 1 Tab by mouth daily. Indications: CHRONIC HEART FAILURE    sertraline (ZOLOFT) 50 mg tablet TAKE 1 TABLET BY MOUTH DAILY    XARELTO 20 mg tab tablet TAKE 1 TABLET BY MOUTH EVERY DAY     No current facility-administered medications for this visit. Recommended healthy diet low in carbohydrates, fats, sodium and cholesterol. Recommended regular cardiovascular exercise 3-6 times per week for 30-60 minutes daily. Verbal and written instructions (see AVS) provided. Patient expresses understanding of diagnosis and treatment plan.

## 2017-02-13 NOTE — PROGRESS NOTES
Chief Complaint   Patient presents with    Hypertension     follow-up     Health Maintenance reviewed

## 2017-02-13 NOTE — MR AVS SNAPSHOT
Visit Information Date & Time Provider Department Dept. Phone Encounter #  
 2/13/2017 10:00 AM Al Collins [facility] [phone] [id number] Your Appointments 3/10/2017  9:45 AM  
PACEMAKER with [id number] [facility] ([facility]ON[facility] SCHEDULING) Appt Note: 2 week wound check from RA lead repo  
 [address] [facility] [address]  
[address] [place]  
  
    
 3/10/2017 10:00 AM  
ESTABLISHED PATIENT with Stephani Jaeger MD  
[facility]ARDIOVASCULAR [facility] ([address]) Appt Note: 2 week wound check from RA lead repo  
 [address] [facility] [address]  
[address] [place]  
  
    
 7/14/2017  9:20 AM  
ESTABLISHED PATIENT with Stephani Jaeger MD  
[facility]ASCULAR [facility] ([address]) Appt Note: 6 month f/u per Dr. Kedar Pan [address] [id number]  
[phone]  
  
    
 2/9/2018 10:15 AM  
PACEMAKER with [email or web address], Odetta Schirmer [facility] ([facility]TH[facility]Y SCHEDULING) Appt Note: bsc icd, rc, annual thresh/Lat  see Go Miller, $0CP  martinez 1/20/17  
 [address] [place] [address]  
[phone]  
  
    
 2/9/2018 10:20 AM  
ESTABLISHED PATIENT with Stephani Jaeger MD  
[facility]ASCULAR [facility] ([address]) Appt Note: bsc icd, rc, annual thresh/Lat  see Go Miller, $0CP  martinez 1/20/17  
 [address] [place] [address]  
[phone]  
  
    
  
 4/25/2017 10:30 AM  
REMOTE OFFICE VISIT with Annie Arvizu [facility] ([facility] SCHEDULING) Appt Note: bsc icd, rc b 1/20/17  
 [address] [place] [address] [id number]  
[address] [address] 8/1/2017  9:30 AM  
REMOTE OFFICE VISIT with Annie Arvizu  
 [facility] [facility] Appt Note: [facility] icd, rc  
 [address] [facility] [address]  
[phone]  
  
    
 11/7/2017  8:00 AM  
REMOTE OFFICE VISIT with Rogelio Szymanski [facility] SCHEDUL[facility] Appt Note: [facility] icd, rc  
 [address] [place] [address]  
[phone] Upcoming Health Maintenance Date Due DTaP/Tdap/Td series (1 - Tdap) 3/21/1972 GLAUCOMA SCREENING Q2Y 3/21/2016 OSTEOPOROSIS SCREENING (DEXA) 3/21/2016 FOBT Q 1 YEAR AGE 50-75 3/16/2017 BREAST CANCER SCRN MAMMOGRAM 6/9/2017 Pneumococcal 65+ Low/Medium Risk (2 of 2 - PCV13) 6/2/2017 MEDICARE YEARLY EXAM 6/3/2017 Allergies as of 2/13/2017  Review Complete On: 2/13/2017 By: Dayron Ellis MD  
 No Known Allergies Current Immunizations  Never Reviewed Name Date Pneumococcal Polysaccharide (PPSV-23) 6/2/2016 Not reviewed this visit You Were Diagnosed With   
  
 Codes Comments Depression, unspecified depression type    -  Primary ICD-10-CM: F32.9 ICD-9-CM: 065 Colon cancer screening     ICD-10-CM: Z12.11 ICD-9-CM: V76.51 Encounter for mammogram to establish baseline mammogram     ICD-10-CM: Z12.31 
ICD-9-CM: V76.12 Postmenopausal bone loss     ICD-10-CM: M81.0 ICD-9-CM: 733.01 Chronic systolic congestive heart failure, NYHA class 3 (HCC)     ICD-10-CM: I50.22 ICD-9-CM: 428.22, 428.0 Idiopathic cardiomyopathy ([facility] [address]     ICD-10-CM: I42.8 ICD-9-CM: 425.4 Pacemaker lead malfunction, initial encounter     ICD-10-CM: T82.110A 
ICD-9-CM: 996.01 History of amiodarone therapy     ICD-10-CM: Z92.29 ICD-9-CM: V87.49 Vitals BP Pulse Temp Resp Height(growth percentile) Weight(growth percentile) 98/63 (BP 1 Location: Left arm, BP Patient Position: Sitting) 70 98.1 °F (36.7 °C) (Oral) 19 5' 2\" (1.575 m) 177 lb (80.3 kg) SpO2 BMI OB Status Smoking Status 98% 32.37 kg/m2 Postmenopausal Never Smoker BMI and BSA Data Body Mass Index Body Surface Area  
 32.37 kg/m 2 1.87 m 2 Preferred Pharmacy Pharmacy Name Phone Sierra Mendez, Jyoti [address] [phone] Your Updated Medication List  
  
   
This list is accurate as of: 2/13/17 10:58 AM.  Always use your most recent med list.  
  
  
  
  
 amiodarone 200 mg tablet Commonly known as:  CORDARONE Take 1 Tab by mouth daily. carvedilol 6.25 mg tablet Commonly known as:  Suad Locket Take 1 Tab by mouth two (2) times daily (with meals). furosemide 20 mg tablet Commonly known as:  LASIX TAKE 1 TABLET BY MOUTH DAILY losartan 25 mg tablet Commonly known as:  COZAAR Take 1 Tab by mouth daily. sertraline 50 mg tablet Commonly known as:  ZOLOFT  
TAKE 1 TABLET BY MOUTH DAILY  
  
 spironolactone 25 mg tablet Commonly known as:  ALDACTONE Take 1 Tab by mouth daily. Indications: CHRONIC HEART FAILURE  
  
 XARELTO 20 mg Tab tablet Generic drug:  rivaroxaban TAKE 1 TABLET BY MOUTH EVERY DAY We Performed the Following OCCULT BLOOD, IMMUNOASSAY (FIT) [id number] CPT(R)] To-Do List   
 02/13/2017 Imaging:  DEXA BONE DENSITY STUDY AXIAL   
  
 02/13/2017 Imaging:  DEBBI 3D FITZ W MAMMO BI SCREENING INCL CAD   
  
 02/22/2017 1:15 PM  
  Appointment with CATH ROOM 3 [facility] at [address] [place] [address] ([phone]) NPO AFTER MIDNIGHT! ROUTINE CASES:  Please arrive 2 hour prior to your scheduled appointment time. If your scheduled appointment is for 0730, 0800, 0815, please arrive by 0645. NON ROUTINE CASES:  PATIENTS WHO REQUIRE LABS, X-RAY, EKG, or MEDS:  PLEASE ARRIVE 3 HOURS PRIOR TO YOUR SCHEDULED APPOINTMENT.   If you require hydration prior to your procedure, PLEASE ARRIVE 4 HOURS PRIOR TO YOUR APPOINTMENT  **** IT IS THE OFFICE [address] NOTIFY THE CATH LAB SCHEDULAR IF THE PATIENT WILL REQUIRE ANY ADDITIONAL TIME FOR PREP FROM ROUTINE CASE ***** Referral Information Referral ID Referred By Referred To  
  
 [id number] Justice PAUL Not Available Visits Status Start Date End Date 1 New Request 2/13/17 2/13/18 If your referral has a status of pending review or denied, additional information will be sent to support the outcome of this decision. Introducing [facility]! [employer] introduces [employer] patient portal. Now you can access parts of your medical record, email your doctor's office, and request medication refills online. 1. In your internet browser, go to [email or web address] 2. Click on the First Time User? Click Here link in the Sign In box. You will see the New Member Sign Up page. 3. Enter your [employer] Access Code exactly as it appears below. You will not need to use this code after youve completed the sign-up process. If you do not sign up before the expiration date, you must request a new code. · [employer] Access Code: [email or web address] Expires: 4/20/2017 10:29 AM 
 
4. Enter the last four digits of your Social Security Number (xxxx) and Date of Birth (mm/dd/yyyy) as indicated and click Submit. You will be taken to the next sign-up page. 5. Create a [employer] ID. This will be your [employer] login ID and cannot be changed, so think of one that is secure and easy to remember. 6. Create a [employer] password. You can change your password at any time. 7. Enter your Password Reset Question and Answer. This can be used at a later time if you forget your password. 8. Enter your e-mail address. You will receive e-mail notification when new information is available in [address] 9. Click Sign Up. You can now view and download portions of your medical record. 10. Click the Download Summary menu link to download a portable copy of your medical information. If you have questions, please visit the Frequently Asked Questions section of the [employer] website. Remember, [employer] is NOT to be used for urgent needs. For medical emergencies, dial 911. Now available from your iPhone and Android! Please provide this summary of care documentation to your next provider. Your primary care clinician is listed as Zahra Meade. If you have any questions after today's visit, please call [phone].

## 2017-02-15 ENCOUNTER — TELEPHONE (OUTPATIENT)
Dept: CARDIOLOGY CLINIC | Age: 67
End: 2017-02-15

## 2017-02-15 NOTE — TELEPHONE ENCOUNTER
----- Message from Chapo Resendez NP sent at 2/15/2017  3:17 PM EST -----  Treadmill stress test -fair exercise tolerance  HR increases with exercise  No evidence of ischemia   Scheduled for RA lead reposition  2/22/2017  1:15 PM    CATH ROOM 3 [address] [place] [address]

## 2017-02-15 NOTE — PROGRESS NOTES
Treadmill stress test -fair exercise tolerance  HR increases with exercise  No evidence of ischemia   Scheduled for RA lead reposition  2/22/2017  1:15 PM    CATH ROOM 3 [address] [place] [address]

## 2017-02-17 RX ORDER — SODIUM CHLORIDE 0.9 % (FLUSH) 0.9 %
5-10 SYRINGE (ML) INJECTION AS NEEDED
Status: CANCELLED | OUTPATIENT
Start: 2017-02-17

## 2017-02-17 RX ORDER — SODIUM CHLORIDE 0.9 % (FLUSH) 0.9 %
5-10 SYRINGE (ML) INJECTION EVERY 8 HOURS
Status: CANCELLED | OUTPATIENT
Start: 2017-02-17

## 2017-02-17 RX ORDER — CEFAZOLIN SODIUM IN 0.9 % NACL 2 G/50 ML
2 INTRAVENOUS SOLUTION, PIGGYBACK (ML) INTRAVENOUS ONCE
Status: CANCELLED | OUTPATIENT
Start: 2017-02-17 | End: 2017-02-18

## 2017-02-22 ENCOUNTER — APPOINTMENT (OUTPATIENT)
Dept: GENERAL RADIOLOGY | Age: 67
End: 2017-02-22
Attending: NURSE PRACTITIONER
Payer: MEDICARE

## 2017-02-22 ENCOUNTER — HOSPITAL ENCOUNTER (OUTPATIENT)
Dept: NON INVASIVE DIAGNOSTICS | Age: 67
Discharge: HOME | End: 2017-02-22
Attending: INTERNAL MEDICINE | Admitting: INTERNAL MEDICINE
Payer: MEDICARE

## 2017-02-22 VITALS
HEART RATE: 60 BPM | TEMPERATURE: 97.7 F | WEIGHT: 174 LBS | HEIGHT: 62 IN | RESPIRATION RATE: 16 BRPM | DIASTOLIC BLOOD PRESSURE: 55 MMHG | OXYGEN SATURATION: 100 % | BODY MASS INDEX: 32.02 KG/M2 | SYSTOLIC BLOOD PRESSURE: 102 MMHG

## 2017-02-22 PROCEDURE — 77030028698 HC BLD TISS PLSM MEDT -D

## 2017-02-22 PROCEDURE — 77030018547 HC SUT ETHBND1 J&J -B

## 2017-02-22 PROCEDURE — C1892 INTRO/SHEATH,FIXED,PEEL-AWAY: HCPCS

## 2017-02-22 PROCEDURE — 74011000250 HC RX REV CODE- 250: Performed by: INTERNAL MEDICINE

## 2017-02-22 PROCEDURE — 74011250637 HC RX REV CODE- 250/637: Performed by: INTERNAL MEDICINE

## 2017-02-22 PROCEDURE — 33216 INSERT 1 ELECTRODE PM-DEFIB: CPT

## 2017-02-22 PROCEDURE — 74011250636 HC RX REV CODE- 250/636: Performed by: INTERNAL MEDICINE

## 2017-02-22 PROCEDURE — 71010 XR CHEST PORT: CPT

## 2017-02-22 PROCEDURE — 77030036550 HC SLNG ARM PCH S2SG -A

## 2017-02-22 PROCEDURE — 77030031139 HC SUT VCRL2 J&J -A

## 2017-02-22 PROCEDURE — C1898 LEAD, PMKR, OTHER THAN TRANS: HCPCS

## 2017-02-22 PROCEDURE — 74011636320 HC RX REV CODE- 636/320: Performed by: INTERNAL MEDICINE

## 2017-02-22 PROCEDURE — 77030011640 HC PAD GRND REM COVD -A

## 2017-02-22 PROCEDURE — 77030018729 HC ELECTRD DEFIB PAD CARD -B

## 2017-02-22 PROCEDURE — 74011000272 HC RX REV CODE- 272: Performed by: INTERNAL MEDICINE

## 2017-02-22 RX ORDER — CEFAZOLIN SODIUM IN 0.9 % NACL 2 G/50 ML
2 INTRAVENOUS SOLUTION, PIGGYBACK (ML) INTRAVENOUS ONCE
Status: COMPLETED | OUTPATIENT
Start: 2017-02-22 | End: 2017-02-22

## 2017-02-22 RX ORDER — ACETAMINOPHEN AND CODEINE PHOSPHATE 300; 30 MG/1; MG/1
1 TABLET ORAL
Qty: 15 TAB | Refills: 0 | Status: SHIPPED | OUTPATIENT
Start: 2017-02-22 | End: 2017-06-19

## 2017-02-22 RX ORDER — ATROPINE SULFATE 0.1 MG/ML
1 INJECTION INTRAVENOUS AS NEEDED
Status: DISCONTINUED | OUTPATIENT
Start: 2017-02-22 | End: 2017-02-22

## 2017-02-22 RX ORDER — VANCOMYCIN HYDROCHLORIDE 1 G/20ML
1 INJECTION, POWDER, LYOPHILIZED, FOR SOLUTION INTRAVENOUS ONCE
Status: COMPLETED | OUTPATIENT
Start: 2017-02-22 | End: 2017-02-22

## 2017-02-22 RX ORDER — SODIUM CHLORIDE 9 MG/ML
25 INJECTION, SOLUTION INTRAVENOUS CONTINUOUS
Status: DISCONTINUED | OUTPATIENT
Start: 2017-02-22 | End: 2017-02-22 | Stop reason: HOSPADM

## 2017-02-22 RX ORDER — NALOXONE HYDROCHLORIDE 1 MG/ML
0.4 INJECTION INTRAMUSCULAR; INTRAVENOUS; SUBCUTANEOUS AS NEEDED
Status: DISCONTINUED | OUTPATIENT
Start: 2017-02-22 | End: 2017-02-22 | Stop reason: HOSPADM

## 2017-02-22 RX ORDER — SODIUM CHLORIDE 0.9 % (FLUSH) 0.9 %
5-10 SYRINGE (ML) INJECTION AS NEEDED
Status: DISCONTINUED | OUTPATIENT
Start: 2017-02-22 | End: 2017-02-22 | Stop reason: HOSPADM

## 2017-02-22 RX ORDER — ASPIRIN 81 MG/1
81 TABLET ORAL
COMMUNITY
End: 2017-03-06 | Stop reason: ALTCHOICE

## 2017-02-22 RX ORDER — MIDAZOLAM HYDROCHLORIDE 1 MG/ML
.5-1 INJECTION, SOLUTION INTRAMUSCULAR; INTRAVENOUS
Status: DISCONTINUED | OUTPATIENT
Start: 2017-02-22 | End: 2017-02-22

## 2017-02-22 RX ORDER — SODIUM CHLORIDE 0.9 % (FLUSH) 0.9 %
5-10 SYRINGE (ML) INJECTION EVERY 8 HOURS
Status: DISCONTINUED | OUTPATIENT
Start: 2017-02-22 | End: 2017-02-22 | Stop reason: HOSPADM

## 2017-02-22 RX ORDER — CEPHALEXIN 500 MG/1
500 CAPSULE ORAL 3 TIMES DAILY
Qty: 15 CAP | Refills: 0 | Status: SHIPPED | OUTPATIENT
Start: 2017-02-22 | End: 2017-03-06 | Stop reason: ALTCHOICE

## 2017-02-22 RX ORDER — ACETAMINOPHEN 325 MG/1
650 TABLET ORAL
Status: COMPLETED | OUTPATIENT
Start: 2017-02-22 | End: 2017-02-22

## 2017-02-22 RX ORDER — SODIUM CHLORIDE 0.9 % (FLUSH) 0.9 %
5-10 SYRINGE (ML) INJECTION AS NEEDED
Status: DISCONTINUED | OUTPATIENT
Start: 2017-02-22 | End: 2017-02-22

## 2017-02-22 RX ORDER — FENTANYL CITRATE 50 UG/ML
25-200 INJECTION, SOLUTION INTRAMUSCULAR; INTRAVENOUS
Status: DISCONTINUED | OUTPATIENT
Start: 2017-02-22 | End: 2017-02-22

## 2017-02-22 RX ORDER — LIDOCAINE HYDROCHLORIDE 10 MG/ML
10-30 INJECTION INFILTRATION; PERINEURAL AS NEEDED
Status: DISCONTINUED | OUTPATIENT
Start: 2017-02-22 | End: 2017-02-22

## 2017-02-22 RX ADMIN — FENTANYL CITRATE 25 MCG: 50 INJECTION, SOLUTION INTRAMUSCULAR; INTRAVENOUS at 13:52

## 2017-02-22 RX ADMIN — ACETAMINOPHEN 650 MG: 325 TABLET, FILM COATED ORAL at 16:51

## 2017-02-22 RX ADMIN — Medication 10 ML: at 14:00

## 2017-02-22 RX ADMIN — MIDAZOLAM HYDROCHLORIDE 1 MG: 1 INJECTION, SOLUTION INTRAMUSCULAR; INTRAVENOUS at 14:32

## 2017-02-22 RX ADMIN — VANCOMYCIN HYDROCHLORIDE 1000 MG: 1 INJECTION, POWDER, LYOPHILIZED, FOR SOLUTION INTRAVENOUS at 14:21

## 2017-02-22 RX ADMIN — MIDAZOLAM HYDROCHLORIDE 1 MG: 1 INJECTION, SOLUTION INTRAMUSCULAR; INTRAVENOUS at 14:22

## 2017-02-22 RX ADMIN — MIDAZOLAM HYDROCHLORIDE 1 MG: 1 INJECTION, SOLUTION INTRAMUSCULAR; INTRAVENOUS at 13:50

## 2017-02-22 RX ADMIN — LIDOCAINE HYDROCHLORIDE 40 ML: 10 INJECTION, SOLUTION INFILTRATION; PERINEURAL at 13:50

## 2017-02-22 RX ADMIN — CEFAZOLIN 2 G: 1 INJECTION, POWDER, FOR SOLUTION INTRAMUSCULAR; INTRAVENOUS; PARENTERAL at 13:25

## 2017-02-22 RX ADMIN — MIDAZOLAM HYDROCHLORIDE 1 MG: 1 INJECTION, SOLUTION INTRAMUSCULAR; INTRAVENOUS at 14:29

## 2017-02-22 RX ADMIN — SODIUM CHLORIDE 50000 UNITS: 900 IRRIGANT IRRIGATION at 14:20

## 2017-02-22 RX ADMIN — FENTANYL CITRATE 25 MCG: 50 INJECTION, SOLUTION INTRAMUSCULAR; INTRAVENOUS at 13:50

## 2017-02-22 RX ADMIN — IOPAMIDOL 10 ML: 755 INJECTION, SOLUTION INTRAVENOUS at 13:59

## 2017-02-22 RX ADMIN — FENTANYL CITRATE 50 MCG: 50 INJECTION, SOLUTION INTRAMUSCULAR; INTRAVENOUS at 13:33

## 2017-02-22 RX ADMIN — MIDAZOLAM HYDROCHLORIDE 2 MG: 1 INJECTION, SOLUTION INTRAMUSCULAR; INTRAVENOUS at 13:32

## 2017-02-22 RX ADMIN — SODIUM CHLORIDE 25 ML/HR: 900 INJECTION, SOLUTION INTRAVENOUS at 12:00

## 2017-02-22 RX ADMIN — Medication 10 ML: at 13:34

## 2017-02-22 RX ADMIN — MIDAZOLAM HYDROCHLORIDE 1 MG: 1 INJECTION, SOLUTION INTRAMUSCULAR; INTRAVENOUS at 13:52

## 2017-02-22 NOTE — H&P (VIEW-ONLY)
Subjective:       CARDIAC ELECTROPHYSIOLOGY CLINIC    Karla Peterson is a 73 y.o. female who is seen for evaluation of right atrial pacing lead  RA lead was dislodged on xray  Stress test showed appropriate sinus response but she had PVCs and 4 beat NSVT. She has paroxysmal atrial fibrillation, chronic idiopathic cardiomyopathy EF 30% in the past but last year in 2/2015 she had abnormal nuclear stress test with EF 31% and echo EF 38% she had cardiac cath with Dr Radha Aquino and she had normal coronary arteries and LVEF 25%. She had IVCD/LBBB type  She then had a new atrial lead removal (atrial lead noise known for a long time) and BIV ICD upgrade (new LV lead) 6/21/16. She says she has been doing well. No recent hospitalizations for CHF. No LE edema, abdominal bloating, chest pain or palpitations. She takes amiodarone, she admits she has not had a eye exam.   She takes xarelto for embolic CVA prevention.  She denies blood in the stool or urine      Cardiac testing   Cardiac cath 2/26/15 Normal coronaries, severely reduced LV function estimated EF 25%          [facility] cardiolite stress test 2/19/2015 showed atrial pacing, ventricular sensing with IVCD, diffuse nonspecific STT wave changes and PVCs  Myocardial perfusion images showed a large but partially reversible ischemia in anterior, anteroseptal and anterolateral walls consistent with ischemia in the LAD territory  LVEF 31%                           Problem List  Date Reviewed: 2/9/2017          Codes Class Noted    Pacemaker lead malfunction ICD-10-CM: T82.110A  ICD-9-CM: 996.01  6/21/2016        Chronic systolic congestive heart failure, NYHA class 3 ([facility]) ICD-10-CM: I50.22  ICD-9-CM: 428.22, 428.0  6/21/2016        Biventricular ICD (implantable cardioverter-defibrillator) in place ICD-10-CM: Z95.810  ICD-9-CM: V45.02  6/21/2016    Overview Addendum 6/21/2016  7:44 AM by Naeem Ramirez MD     Upgrade from ICD [facility] [employer]  Right atrial lead extraction and reimplant 6/21/2016             Left bundle branch block (LBBB) on electrocardiogram ICD-10-CM: I44.7  ICD-9-CM: 426.3  6/21/2016        Advanced care planning/counseling discussion ICD-10-CM: Z71.89  ICD-9-CM: V65.49  6/2/2016    Overview Signed 6/2/2016 10:06 AM by Shlomo Galaviz MD     Discussed naming a decision maker. Patient not able to make a decision at this time. Depression ICD-10-CM: F32.9  ICD-9-CM: 915  8/28/2015        Hypertension ICD-10-CM: I10  ICD-9-CM: 401.9  8/28/2015        History of amiodarone therapy ICD-10-CM: Z92.29  ICD-9-CM: V87.49  12/30/2011        Idiopathic cardiomyopathy (HCC) ICD-10-CM: I42.8  ICD-9-CM: 425.4  Unknown    Overview Addendum 2/7/2017  9:50 AM by Nilo Ocampo NP     Non ischemic by cath 3/15. EF 21% 11/16 by echo  2/6/17 Stress test showed appropriate sinus response but she had PVCs and 4 beat NSVT. Automatic implantable cardiac defibrillator in situ ICD-10-CM: Z95.810  ICD-9-CM: V45.02  Unknown    Overview Addendum 7/30/2012  8:51 AM by Shae Rao MD     ICD DFT test at 26 J converted VF to NSR successfully (she is on Amiodarone) 5/13/2011  Lead noises with isometric arm activity. Fluoroscopy 7/30/2012 does not show fracture of disconnection             Paroxysmal ventricular tachycardia (HCC) ICD-10-CM: I47.2  ICD-9-CM: 427.1  Unknown        PAF (paroxysmal atrial fibrillation) ([facility] [address] ICD-10-CM: I48.0  ICD-9-CM: 427.31  Unknown    Overview Addendum 4/2/2011  3:42 PM by Shae Rao MD     ICD shock 3/28/2011 for rapid Afib  ICD shock 6/3/2010  (ICD with ATP for VT in the past)                 Current Outpatient Prescriptions   Medication Sig    losartan (COZAAR) 25 mg tablet Take 1 Tab by mouth daily.  carvedilol (COREG) 6.25 mg tablet Take 1 Tab by mouth two (2) times daily (with meals).  furosemide (LASIX) 20 mg tablet TAKE 1 TABLET BY MOUTH DAILY    amiodarone (CORDARONE) 200 mg tablet Take 1 Tab by mouth daily.     spironolactone (ALDACTONE) 25 mg tablet Take 1 Tab by mouth daily. Indications: CHRONIC HEART FAILURE    sertraline (ZOLOFT) 50 mg tablet TAKE 1 TABLET BY MOUTH DAILY    XARELTO 20 mg tab tablet TAKE 1 TABLET BY MOUTH EVERY DAY     No current facility-administered medications for this visit. No Known Allergies  Past Medical History   Diagnosis Date    Congestive heart failure, unspecified     Cardiomyopathy, nonischemic     Atrial fibrillation     ICD (implantable cardiac defibrillator) in place 9/18/09     [facility] DR Zaragoza History of amiodarone therapy 12/30/2011    ICD (implantable cardioverter-defibrillator) in place      Past Surgical History   Procedure Laterality Date    Hx heart catheterization  2006     no CAD, EF 25%    Echo 2d adult  4/1/2011     EF 30%, mild to moderate LA enlargement, mild MR    Ir fluoroscopy < 1 hour  7/30/2012          Hx pacemaker placement  2009    Hx pacemaker         History   Substance Use Topics    Smoking status: Never Smoker     Smokeless tobacco: Never Used    Alcohol Use: 2.5 oz/week     5 Cans of beer per week      Comment: occasional      Review of Systems    Constitutional: Negative for fever, chills, weight loss, malaise/fatigue  HEENT: Negative for nosebleeds, and vision changes. Tooth extraction coming up  Respiratory: Negative for cough, hemoptysis, sputum production, and wheezing. Cardiovascular: Negative for chest pain, palpitations, leg swelling, no syncope, and PND. Gastrointestinal: Negative for nausea, vomiting, blood in stool and melena. Genitourinary: Negative for hematuria. Musculoskeletal: Negative for myalgias. Skin: Negative for rash and itching. Heme: Does not bleed or bruise easily.    Neurological: Negative for speech change and focal weakness      Objective:     Visit Vitals    /60 (BP 1 Location: Right arm, BP Patient Position: Sitting)    Pulse 60    Ht 5' 2\" (1.575 m)    Wt 178 lb (80.7 kg)  BMI 32.56 kg/m2       Physical Exam:   General:  alert, cooperative, no distress, appears stated age   Neck: carotids upstroke normal bilaterally, no bruits, no JVD   Chest Wall: inspection showed normal left sided BIV ICD site   Lung: clear to auscultation bilaterally   Heart:  normal rate, regular rhythm,no rubs, clicks or gallops   Abdomen: soft, mild obesity. Extremities: no edema   Skin: left sided biventircular ICD site unremarkable, thin scar          Assessment/Plan:       ICD-10-CM ICD-9-CM    1. Atrial pacemaker lead displacement, initial encounter T82.120A 996.01    2. PAF (paroxysmal atrial fibrillation) ([facility]) I48.0 427.31    3. Idiopathic cardiomyopathy ([facility]) I42.8 425.4    4. Biventricular automatic implantable cardioverter defibrillator in situ Z95.810 V45.02    RA lead dislodgment. 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 She is on GDMT, BP low usually  She is tolerating xarelto without bleeding side effects. She will hold for tooth extraction and procedure  She is on amiodarone and tolerating without side effects. TSh normal  LFTs normal  Echo with still depressed LVEF  Chest Xray 6/2016 no evidence of pulmonary fibrosis. BIV ICD with VDD pacing mode at this time  She will make an eye appointment annually     Follow-up Disposition:  Return in about 4 weeks (around 3/9/2017). Haven Lambert M.D.  Sheng Sears  Electrophysiology/Cardiology  [address] [facility] [place]  [address], [facility], [address]  [phone]

## 2017-02-22 NOTE — PROGRESS NOTES
Cardiac Cath Lab Recovery Arrival Note:      Rukhsana Gallardo arrived to Cardiac Cath Lab, Recovery Area. Staff introduced to patient. Patient identifiers verified with NAME and DATE OF BIRTH. Procedure verified with patient. Consent forms reviewed and signed by patient or authorized representative and verified. Allergies verified. Patient and family oriented to department. Patient and family informed of procedure and plan of care. Questions answered with review. Patient prepped for procedure, per orders from physician, prior to arrival.    Patient on cardiac monitor, non-invasive blood pressure, SPO2 monitor. On room air. Patient is A&Ox 4. Patient reports no complaints. Patient in stretcher, in low position, with side rails up, call bell within reach, patient instructed to call if assistance as needed. Patient prep in: [address], [place].    Patient family has pager # [phone]  Family in: hospital.   Prep by: Gabe Brady RN

## 2017-02-22 NOTE — INTERVAL H&P NOTE
H&P Update:  Lisset Gifford was seen and examined. History and physical has been reviewed. The patient has been examined.  There have been no significant clinical changes since the completion of the originally dated History and Physical.  She agrees with lead reimplant    Signed By: Quincy Gonsalves MD     February 22, 2017 2:57 PM

## 2017-02-22 NOTE — PROCEDURES
DATE OF PROCEDURE:   2/22/2017    PROCEDURE:    1. Right atrial pacing lead removal  2. Right atrial pacing lead implantation  3. Fluoroscopy and contrast venogram were performed. 4. The non-invasive programmed stimulation test was also performed. BRIEF HISTORY:  This is a 73 y.o. woman who has BIV ICD since 6/2016 and she has non ischemic cardiomyopathy but recently at follow up it was noted that her right atrial lead does not capture and xray confirmed that the lead has been dislodged and today it appears to migrate into the right ventricle on testing. She is recommended to have the lead repositioned or replaced. 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 The risks and benefits were discussed with her and her  and they understood the risks and benefit and agreed to proceed. PROCEDURE IN DETAIL:   After informed written consent was obtained, the patient was brought to the Electrophysiology Suite where she was prepped and draped in the usual sterile fashion. Conscious sedation was initiated and maintained with intravenous Versed and fentanyl. Xylocaine was given in the left infraclavicular area. 10 ml of contrast was then injected into the left antecubital vein and the left subclavian vein appeared to be patent. Using a modified Seldinger technique, with micropuncture needle the wire was then advanced inside the left subclavian vein toward the right atrium after the #10 blade scalpel was then used to make a 4 cm incision below the left clavicle. The incision was taken down to the fascial tissue layer using peak plasma blade and blunt dissection. The active helix on the old right atrial pacer lead did not fully retract so it was removed and sent back to [employer] for analysis. Over the guidewire a 7 Japanese peel-away introducer and dilator was then advanced inside left subclavian vein.   An active fixation and pacing and sensing lead was positioned in the right atrial appendage first but there was no injury current on both attempts so the lead was implanted into the right atrial anterior wall. The lead is anchored down with #2 Ethibond sutures after the pacing and sensing threshold was determined to be optimal.  There was no diaphragmatic stimulation    The pocket is now irrigated with antibiotic solutions and the leads were connected to the device and inserted inside a pocket and was anchored down with #2 Ethibond sutures. The pocket is now closed in three consecutive layers using 2-0 Vicryl sutures in continuous fashion. Vancomycin powder was placed inside the pocket    The patient is now given additional conscious sedation for the non-invasive programmed stimulation test.    The patient was given more versed and after the conscious sedation was maintained, the ventricular fibrillation was induced by shock on T wave method and induced the ventricular fibrillations. The device delivered a 26 joule shock with a shocking impedance of 40 ohms and a charge time of 5.3 seconds and converted her to sinus rhythm successfully. The patient is now arousable and has intact neurological function. There was no complication. Estimated blood loss was 10 ml. IMPLANTED HARDWARE:     Right atrial lead: [employer] 0855-52, Serial # [id number]  Left Ventricular lead [facility] [employer] X4 [place], [place] 9817-43, Serial # [id number] DOI 6/21/2016  The BIV ICD is [employer] X4 CRT-D, model G 146 and Serial # [id number] DOI 6/21/2016  RIGHT VENTRICULAR ICD LEAD: Guidant model # [id number], and serial # [id number] DOI 9/18/2009     Specimen removed: right atrial lead [employer] 2258, Serial # [id number] DOI 6/21/2016    DATA:  The P-wave is sensed 5 mV. Pacing impedance 843 ohms. Pacing threshold 0.6 V at  0.5 ms. PROGRAMMED PARAMETERS:  The device was programmed to DDDR with a low rate of 60 and upper tracking rate of 130 beats per minute.       The ventricular tachycardia is programmed to detect at 180 beats per minute and therapy consists of 2 ATPs and follow with all shock maxed out at 41 joules. The ventricular fibrillation is detected at 220 beats or more and therapy consists of all six shocks at 41 joules.     ASSESSMENT AND PLAN:  The patient will be reminded of left arm restriction and follow up in office

## 2017-02-22 NOTE — IP AVS SNAPSHOT
[address] 
[phone] Patient: Zulma Coello MRN: [id number] DIF:[birth year] You are allergic to the following No active allergies Recent Documentation Height  
  
  
  
  
  
 1.575 m Emergency Contacts Name Discharge Info Relation Home Work Mobile [facility] DISCHARGE CAREGIVER [3] Spouse [3] [phone] About your hospitalization You were admitted on:  February 22, 2017 You last received care in the:  30 [facility] You were discharged on:  February 22, 2017 Unit phone number:  [phone] Why you were hospitalized Your primary diagnosis was:  Biventricular Icd (Implantable Cardioverter-Defibrillator) In Place Providers Seen During Your Hospitalizations Provider Role Specialty Primary office phone Stephani Jaeger MD Attending Provider Cardiology [phone] Your Primary Care Physician (PCP) Primary Care Physician Office Phone Office Fax [phone] [address] [place] [address] [phone] Follow-up Information Follow up With Details Comments Contact Info Stephani Jaeger MD On 3/10/2017 @945 AM [address] [place] 
[phone] Kristie Castillo MD   [address], [address] 
[phone] Your Appointments Friday March 10, 2017  9:45 AM EST  
PACEMAKER with PACEMAKER3, Odetta Schirmer [facility] (ANTHONY SCHEDULING) [address] [place] [address] [address] [place]  
[phone] Friday March 10, 2017 10:00 AM EST  
ESTABLISHED PATIENT with Stephani Jaeger MD  
[facility] ([facility] [address] [place]  
[phone]  Monday March 27, 2017 12:30 PM EDT  
DEBBI 3D Braden Paredes 21 with [facility] DEBBI 2  
 [facility]) [facility]  
[phone] Shower or bathe using soap and water. Do not use deodorant, powder, perfumes, or lotion the day of your exam.  If your prior mammograms were not performed at [facility] 6 please bring films with you or forward prior images 2 days before your procedure. Check in at registration 15min before your appointment time unless you were instructed to do otherwise. A script is not necessary for screening. If you have one, please bring it on the day of the mammogram or have it faxed to the department. [facility]  [phone] [facility] [phone] [facility] [phone] [facility] [phone] Patient should report to outpatient registration (Medical Office Building One) 30 minutes prior to the appointment time unless instructed otherwise. Current Discharge Medication List  
  
START taking these medications Dose & Instructions Dispensing Information Comments Morning Noon Evening Bedtime  
 acetaminophen-codeine 300-30 mg per tablet Commonly known as:  TYLENOL-CODEINE #3 Your next dose is: Today, Tomorrow Other:  _________ Dose:  1 Tab Take 1 Tab by mouth every four (4) hours as needed for Pain. Max Daily Amount: 6 Tabs. Quantity:  15 Tab Refills:  0  
     
   
   
   
  
 cephALEXin 500 mg capsule Commonly known as:  Larayne Leigh Your next dose is: Today, Tomorrow Other:  _________ Dose:  500 mg Take 1 Cap by mouth three (3) times daily. Quantity:  15 Cap Refills:  0 CONTINUE these medications which have NOT CHANGED Dose & Instructions Dispensing Information Comments Morning Noon Evening Bedtime  
 amiodarone 200 mg tablet Commonly known as:  CORDARONE Your next dose is: Today, Tomorrow Other:  _________  Dose:  200 mg  
 Take 1 Tab by mouth daily. Quantity:  90 Tab Refills:  1  
     
   
   
   
  
 aspirin delayed-release 81 mg tablet Your next dose is: Today, Tomorrow Other:  _________ Dose:  81 mg Take 81 mg by mouth every four (4) hours as needed for Pain. Refills:  0  
     
   
   
   
  
 carvedilol 6.25 mg tablet Commonly known as:  Robert Bland Your next dose is: Today, Tomorrow Other:  _________ Dose:  6.25 mg Take 1 Tab by mouth two (2) times daily (with meals). Quantity:  60 Tab Refills:  6  
     
   
   
   
  
 furosemide 20 mg tablet Commonly known as:  LASIX Your next dose is: Today, Tomorrow Other:  _________ TAKE 1 TABLET BY MOUTH DAILY Quantity:  90 Tab Refills:  1 Generic For:LASIX  20MG   N O T I C E    Last quantity doesn't match original quantity  
    
   
   
   
  
 losartan 25 mg tablet Commonly known as:  COZAAR Your next dose is: Today, Tomorrow Other:  _________ Dose:  25 mg Take 1 Tab by mouth daily. Quantity:  90 Tab Refills:  1 Generic For:COZAAR 25MG Generic For:COZAAR 25MG  
    
   
   
   
  
 sertraline 50 mg tablet Commonly known as:  ZOLOFT Your next dose is: Today, Tomorrow Other:  _________ TAKE 1 TABLET BY MOUTH DAILY Quantity:  90 Tab Refills:  4 Generic For:ZOLOFT 50MG  
    
   
   
   
  
 spironolactone 25 mg tablet Commonly known as:  ALDACTONE Your next dose is: Today, Tomorrow Other:  _________ Dose:  25 mg Take 1 Tab by mouth daily. Indications: CHRONIC HEART FAILURE Quantity:  30 Tab Refills:  12 XARELTO 20 mg Tab tablet Generic drug:  rivaroxaban Your next dose is: Today, Tomorrow Other:  _________ TAKE 1 TABLET BY MOUTH EVERY DAY Quantity:  30 Tab Refills:  11 Where to Get Your Medications These medications were sent to [facility] [address], [address] [place] Phone:  [phone]  
  cephALEXin 500 mg capsule Information on where to get these meds will be given to you by the nurse or doctor. ! Ask your nurse or doctor about these medications  
  acetaminophen-codeine 300-30 mg per tablet Discharge Instructions Please hold the Xarelto dose for tonight & tomorrow night. Restart Xarelto on Friday evening 2/24/17 Patient Instructions Post-Right atrial lead reposition 1. No heavy lifting or exercises with the left arm for 4 weeks. This includes the following: Do not raise arm above the shoulder level to comb hair, pull on clothes, etc... Do not use the affected arm to pull up or push up from a seated or laying 
down position. Do not allow anyone else to pull on the affected arm. 2. DO NOT SHOWER the FIRST 7 DAYS, you may take a sponge bath provided the site stays clean and DRY. Your incision will have skin glue covering the incision, the skin glue will help to reinforce the incision to prevent it from opening, please DO NOT attempt to peel the glue off of the incision. You do have sutures on the inside of the incision that are not visible. Please DO NOT try to scrub the skin glue off once you are able to take a shower. The skin glue will eventually fall off 3. Do not drive for 3 days 4. Call Dr. Domingo Ingram at [phone] if you experience any of the following symptoms: 
Redness at the ICD site Swelling at or around the ICD or in the left arm Pain around the ICD Dizziness, lightheadedness, fainting spells Lack of energy Shortness of breath Rapid heart rate Chest or muscle twitches 5. Call Dr. Domingo Ingram at [phone] if your ICD delivers a shock. The physician may or may not want to see you in the office (that decision will be made when you call) 6.  Follow-up with Dr. David Valles as scheduled Future Appointments Date Time Provider Chevy Sequeirai 3/10/2017 9:45 AM PACEMAKER3, MAGGY SALINAS ANTHONY SCHED  
3/10/2017 10:00 AM Alyson Avalos  [address]  
3/27/2017 12:30 PM [facility]  
4/25/2017 10:30 AM REMOTE1, MAGGY CLOUDLEVAR ANTHONY SCHED  
7/14/2017 9:20 AM Alyson Avalos  [address]  
8/1/2017 9:30 AM Sheree JonesJjlai ANTHONY SCHED  
11/7/2017 8:00 AM REMOTE1, WINTER [address]  
2/9/2018 10:15 AM PACEMAKER3, WINTER RITA ANTHONY SCHED  
2/9/2018 10:20 AM Alyson Avalos  [address]  
 
 
 
7. You may use pain medication and ice pack for pain relief You may use the sling as a reminder to keep your arm below the shoulder. Wolfgang Merlin, M.D. [facility] Electrophysiology/Cardiology [facility] and [facility] 84, [facility] [address] [place] [address] 
[phone]                                        [phone] Discharge Orders None Introducing [facility]! Sulema Vasquez introduces [facility] patient portal. Now you can access parts of your medical record, email your doctor's office, and request medication refills online. 1. In your internet browser, go to [email or web address] 2. Click on the First Time User? Click Here link in the Sign In box. You will see the New Member Sign Up page. 3. Enter your [facility] Access Code exactly as it appears below. You will not need to use this code after youve completed the sign-up process. If you do not sign up before the expiration date, you must request a new code. · [facility] Access Code: [email or web address] Expires: 4/20/2017 10:29 AM 
 
4. Enter the last four digits of your Social Security Number (xxxx) and Date of Birth (mm/dd/yyyy) as indicated and click Submit.  You will be taken to the next sign-up page. 5. Create a [employer] ID. This will be your [employer] login ID and cannot be changed, so think of one that is secure and easy to remember. 6. Create a [employer] password. You can change your password at any time. 7. Enter your Password Reset Question and Answer. This can be used at a later time if you forget your password. 8. Enter your e-mail address. You will receive e-mail notification when new information is available in [address] 9. Click Sign Up. You can now view and download portions of your medical record. 10. Click the Download Summary menu link to download a portable copy of your medical information. If you have questions, please visit the Frequently Asked Questions section of the [employer] website. Remember, [employer] is NOT to be used for urgent needs. For medical emergencies, dial 911. Now available from your iPhone and Android! General Information Please provide this summary of care documentation to your next provider. Patient Signature:  ____________________________________________________________ Date:  ____________________________________________________________  
  
Claribel Martino Provider Signature:  ____________________________________________________________ Date:  ____________________________________________________________

## 2017-02-22 NOTE — PROGRESS NOTES
Cardiac Cath Lab Procedure Area Arrival Note:    Isauro Banegas arrived to Cardiac Cath Lab, Procedure Area. Patient identifiers verified with NAME and DATE OF BIRTH. Procedure verified with patient. Consent forms verified. Allergies verified. Patient informed of procedure and plan of care. Questions answered with review. Patient voiced understanding of procedure and plan of care. Patient on cardiac monitor, non-invasive blood pressure, SPO2 monitor. On room air. Placed on O2 @ 2 lpm via nasal cannula. IV of NS on pump at 25 ml/hr. Patient status doing well without problems. Patient is A&Ox 4. Patient reports no pain. Patient medicated during procedure with orders obtained and verified by Dr. Jana Bhatt. Refer to patients Cardiac Cath Lab PROCEDURE REPORT for vital signs, assessment, status, and response during procedure, printed at end of case. Printed report on chart or scanned into chart.

## 2017-02-22 NOTE — PROGRESS NOTES
Discharge instruction reviewed with pt and and . assisted with dressing,ice bag to left chest wall  Ambulated 100 ft, gait steady, voided. Sling to left arm  1700 discharged via w/c with .

## 2017-02-22 NOTE — PROGRESS NOTES
Transfer to [address] [place] [address] from Procedure Area    Verbal report given to [facility] on Dave Lovell being transferred to Cardiac Cath Lab  for routine post - op   Patient is post RA lead replacement procedure. Patient stable upon transfer to . Report consisted of patients Situation, Background, Assessment and   Recommendations(SBAR). Information from the following report(s) Procedure Summary, Intake/Output, MAR and Cardiac Rhythm NSR was reviewed with the receiving nurse. Opportunity for questions and clarification was provided. Patient medicated during procedure with orders obtained and verified by Dr. David Valles. Refer to patient PROCEDURE REPORT for vital signs, assessment, status, and response during procedure.

## 2017-02-22 NOTE — PROGRESS NOTES
TRANSFER - IN REPORT:    Verbal report received from procedure RN on Danae Velasquez  being received from [facility] for routine progression of care. Report consisted of patients Situation, Background, Assessment and Recommendations(SBAR). Information from the following report(s) Procedure Summary, MAR, Recent Results and Med Rec Status was reviewed with the receiving clinician. Opportunity for questions and clarification was provided. Assessment completed upon patients arrival to [address] [place] [address] and care assumed. Cardiac Cath Lab Recovery Arrival Note:    Danae Velasquez arrived to [facility] recovery area. Patient procedure= lead revision. Patient on cardiac monitor, non-invasive blood pressure, SPO2 monitor. On room air . IV  of nacl on pump at 100 ml/hr. Patient status doing well without problems. Patient is A&Ox 4, but very sleepy. Patient reports no complaints. PROCEDURE SITE CHECK:    Procedure site:without any bleeding and or hematoma, no pain/discomfort reported at procedure site. No change in patient status. Continue to monitor patient and status. Ice bag to left chest wall applied.   Dr Christine Ramirez talked with     [address] [facility] completed

## 2017-02-22 NOTE — IP AVS SNAPSHOT
Current Discharge Medication List  
  
Take these medications at their scheduled times Dose & Instructions Dispensing Information Comments Morning Noon Evening Bedtime  
 amiodarone 200 mg tablet Commonly known as:  CORDARONE Your next dose is: Today, Tomorrow Other:  ____________ Dose:  200 mg Take 1 Tab by mouth daily. Quantity:  90 Tab Refills:  1  
     
   
   
   
  
 carvedilol 6.25 mg tablet Commonly known as:  Mar [place] Your next dose is: Today, Tomorrow Other:  ____________ Dose:  6.25 mg Take 1 Tab by mouth two (2) times daily (with meals). Quantity:  60 Tab Refills:  6  
     
   
   
   
  
 cephALEXin 500 mg capsule Commonly known as:  Delisa Slot Your next dose is: Today, Tomorrow Other:  ____________ Dose:  500 mg Take 1 Cap by mouth three (3) times daily. Quantity:  15 Cap Refills:  0  
     
   
   
   
  
 losartan 25 mg tablet Commonly known as:  COZAAR Your next dose is: Today, Tomorrow Other:  ____________ Dose:  25 mg Take 1 Tab by mouth daily. Quantity:  90 Tab Refills:  1 Generic For:COZAAR 25MG Generic For:COZAAR 25MG  
    
   
   
   
  
 spironolactone 25 mg tablet Commonly known as:  ALDACTONE Your next dose is: Today, Tomorrow Other:  ____________ Dose:  25 mg Take 1 Tab by mouth daily. Indications: CHRONIC HEART FAILURE Quantity:  30 Tab Refills:  12 Take these medications as needed Dose & Instructions Dispensing Information Comments Morning Noon Evening Bedtime  
 acetaminophen-codeine 300-30 mg per tablet Commonly known as:  TYLENOL-CODEINE #3 Your next dose is: Today, Tomorrow Other:  ____________ Dose:  1 Tab Take 1 Tab by mouth every four (4) hours as needed for Pain. Max Daily Amount: 6 Tabs. Quantity:  15 Tab Refills:  0 aspirin delayed-release 81 mg tablet Your next dose is: Today, Tomorrow Other:  ____________ Dose:  81 mg Take 81 mg by mouth every four (4) hours as needed for Pain. Refills:  0 Take these medications as directed Dose & Instructions Dispensing Information Comments Morning Noon Evening Bedtime  
 furosemide 20 mg tablet Commonly known as:  LASIX Your next dose is: Today, Tomorrow Other:  ____________ TAKE 1 TABLET BY MOUTH DAILY Quantity:  90 Tab Refills:  1 Generic For:LASIX  20MG   N O T I C E    Last quantity doesn't match original quantity  
    
   
   
   
  
 sertraline 50 mg tablet Commonly known as:  ZOLOFT Your next dose is: Today, Tomorrow Other:  ____________ TAKE 1 TABLET BY MOUTH DAILY Quantity:  90 Tab Refills:  4 Generic For:ZOLOFT 50MG XARELTO 20 mg Tab tablet Generic drug:  rivaroxaban Your next dose is: Today, Tomorrow Other:  ____________ TAKE 1 TABLET BY MOUTH EVERY DAY Quantity:  30 Tab Refills:  11 Where to Get Your Medications These medications were sent to [facility] [address]  Aysha Carmona [facility] [address], [address] Phone:  [phone]  
  cephALEXin 500 mg capsule Information about where to get these medications is not yet available ! Ask your nurse or doctor about these medications  
  acetaminophen-codeine 300-30 mg per tablet

## 2017-02-22 NOTE — DISCHARGE INSTRUCTIONS
Please hold the Xarelto dose for tonight & tomorrow night. Restart Xarelto on Friday evening 2/24/17        Patient Instructions Post-Right atrial lead reposition    1. No heavy lifting or exercises with the left arm for 4 weeks. This includes the following:  Do not raise arm above the shoulder level to comb hair, pull on clothes, etc... Do not use the affected arm to pull up or push up from a seated or laying  down position. Do not allow anyone else to pull on the affected arm. 2. DO NOT SHOWER the FIRST 7 DAYS, you may take a sponge bath provided the site stays clean and DRY. Your incision will have skin glue covering the incision, the skin glue will help to reinforce the incision to prevent it from opening, please DO NOT attempt to peel the glue off of the incision. You do have sutures on the inside of the incision that are not visible. Please DO NOT try to scrub the skin glue off once you are able to take a shower. The skin glue will eventually fall off     3. Do not drive for 3 days    4. Call Dr. Heron Hearn at [phone] if you experience any of the following symptoms:  Redness at the ICD site  Swelling at or around the ICD or in the left arm  Pain around the ICD  Dizziness, lightheadedness, fainting spells  Lack of energy  Shortness of breath  Rapid heart rate  Chest or muscle twitches    5. Call Dr. Heron Hearn at [phone] if your ICD delivers a shock. The physician may or may not want to see you in the office (that decision will be made when you call)    6.   Follow-up with Dr. Heron Hearn as scheduled   Future Appointments  Date Time Provider Chevy Sylvester   3/10/2017 9:45 AM PACEMAKER3, [address] [address]   3/10/2017 10:00 AM Brandi Dillon  [address]   3/27/2017 12:30 PM [facility]   4/25/2017 10:30 AM [email or web address] SCHED   7/14/2017 9:20 AM Brandi Dillon  [address]   8/1/2017 9:30 AM REMOTE1, [address] Trevin Richards 11/7/2017 8:00 AM REMOTE1, MAGGY CRUZ SCHED   2/9/2018 10:15 AM PACEMAKER3, MAGGY CRUZ SCHED   2/9/2018 10:20 AM Naeem Ramirez  [address]         7. You may use pain medication and ice pack for pain relief  You may use the sling as a reminder to keep your arm below the shoulder. Vandana Tatum M.D.  [facility]  Electrophysiology/Cardiology  [facility] [place]  [address] , [facility] [address]  [address]  [phone]

## 2017-02-22 NOTE — PROGRESS NOTES
Tolerated food and drink  1645 tylenol 650 mg for c/o left chest wall pain 5/10 with movement. Ermias Reese call [phone] to transfer RX for ancef to [facility] [employer] on [address]  [phone].  stated he can't get to [facility] before pharmacy closes  [address] [facility], [address]. Cassandra Gannon @ Ermias Reese is to transfer to [facility]. For  and pt to fill pain med and  ancef.

## 2017-02-23 ENCOUNTER — PATIENT OUTREACH (OUTPATIENT)
Dept: FAMILY MEDICINE CLINIC | Age: 67
End: 2017-02-23

## 2017-02-24 RX ORDER — AMIODARONE HYDROCHLORIDE 200 MG/1
200 TABLET ORAL DAILY
Qty: 30 TAB | Refills: 3 | Status: SHIPPED | OUTPATIENT
Start: 2017-02-24 | End: 2017-07-10 | Stop reason: SDUPTHER

## 2017-03-05 ENCOUNTER — DOCUMENTATION ONLY (OUTPATIENT)
Dept: CARDIOLOGY CLINIC | Age: 67
End: 2017-03-05

## 2017-03-06 ENCOUNTER — CLINICAL SUPPORT (OUTPATIENT)
Dept: CARDIOLOGY CLINIC | Age: 67
End: 2017-03-06

## 2017-03-06 ENCOUNTER — OFFICE VISIT (OUTPATIENT)
Dept: CARDIOLOGY CLINIC | Age: 67
End: 2017-03-06

## 2017-03-06 VITALS — BODY MASS INDEX: 31.83 KG/M2 | HEIGHT: 62 IN | WEIGHT: 173 LBS

## 2017-03-06 DIAGNOSIS — I48.0 PAF (PAROXYSMAL ATRIAL FIBRILLATION) (HCC): ICD-10-CM

## 2017-03-06 DIAGNOSIS — Z51.89 VISIT FOR WOUND CHECK: ICD-10-CM

## 2017-03-06 DIAGNOSIS — Z95.810 PRESENCE OF BIVENTRICULAR AUTOMATIC CARDIOVERTER/DEFIBRILLATOR (AICD): Primary | ICD-10-CM

## 2017-03-06 DIAGNOSIS — Z79.899 ON AMIODARONE THERAPY: ICD-10-CM

## 2017-03-06 DIAGNOSIS — I42.9 IDIOPATHIC CARDIOMYOPATHY (HCC): ICD-10-CM

## 2017-03-06 DIAGNOSIS — Z95.810 BIVENTRICULAR AUTOMATIC IMPLANTABLE CARDIOVERTER DEFIBRILLATOR IN SITU: Primary | ICD-10-CM

## 2017-03-06 NOTE — PROGRESS NOTES
Cardiac Electrophysiology Wound Check Note      Wound Check 2/22/17 s/p RA lead removal and reimplant of new RA lead. DFTs done as well  Patient is here for wound check. Her  called Dr Urbina Done on Friday night concerned that there was some yellow drainage from BIV   No fever  Physical Exam   Constitutional: Well-nourished. Left arm in sling  Skin: Left side pocket is healing without redness, drainage, hematoma. The wound is intact with skin glue. Covered with gauze (to protect from bra strap)        ASSESSMENT and PLAN   The incision is healing without redness, drainage, hematoma. Intact with skin glue. The patient has finished their anti-biotic and been compliant with arm restrictions. They will continue arms restrictions for 3 more weeks until 3/22/17.   current treatment plan is effective, no change in therapy   Device check shows proper lead and generator functions    Follow-up Disposition:  Return 3 months I will check via device clinic or remote monitoring in the future

## 2017-03-06 NOTE — PROGRESS NOTES
Cardiac Electrophysiology Wound Check Note      Wound Check 2/22/17 s/p RA lead removal and reimplant of new RA lead. DFTs done as well  Patient is here for wound check. Her  called Dr Birt Saint on Friday night concerned that there was some yellow drainage from BIV   No fever  Physical Exam   Constitutional: Well-nourished. Left arm in sling  Skin: Left side pocket is healing without redness, drainage, hematoma. The wound is intact with skin glue. Covered with gauze (to protect from bra strap)        ASSESSMENT and PLAN       ICD-10-CM ICD-9-CM    1. Biventricular automatic implantable cardioverter defibrillator in situ Z95.810 V45.02    2. Idiopathic cardiomyopathy (HCC) I42.8 425.4    3. PAF (paroxysmal atrial fibrillation) ([facility]) I48.0 427.31    4. On amiodarone therapy Z79.899 V58.69    5. Visit for wound check Z51.89 V58.89        The incision is healing without redness, drainage, hematoma. Intact with skin glue. The patient has finished their anti-biotic and been compliant with arm restrictions. They will continue arms restrictions for 3 more weeks until 3/22/17.   current treatment plan is effective, no change in therapy   Device check shows proper lead and generator functions    Follow-up Disposition:  Return 3 months I will check via device clinic or remote monitoring in the future

## 2017-03-06 NOTE — MR AVS SNAPSHOT
Visit Information Date & Time Provider Department Dept. Phone Encounter #  
 3/6/2017  2:00 PM Tobin Lim MD [facility] Janey Wolff [phone] [id number] Your Appointments 3/6/2017  2:15 PM  
PACEMAKER with [id number] [facility] (ANTHONY SCHEDULING) Appt Note: higinio sci BiV ICD/rc/Escobar add on   lead reposition [address] [place] [address] [address] [place] [address]  
  
    
 3/10/2017  9:45 AM  
PACEMAKER with Elma Barry [facility] (ANTHONY SCHEDULING) Appt Note: 2 week wound check from RA lead repo  
 [address] [facility] [address] [address]  
[phone]  
  
    
 3/10/2017 10:00 AM  
ESTABLISHED PATIENT with Tobin Lim MD  
[facility] ([facility]) Appt Note: 2 week wound check from RA lead repo  
 [address] [facility] [address] [place] [address] [address] [facility]  
  
    
 7/14/2017  9:20 AM  
ESTABLISHED PATIENT with Tobin Lim MD  
[facility] ([facility]) Appt Note: 6 month f/u per Dr. Rosalba Sever [address] [address]  
[phone]  
  
    
 2/9/2018 10:15 AM  
PACEMAKER with [id number], Lydia Castillo [facility] (ANTHONY SCHEDULING) Appt Note: bsc icd, rc, annual thresh/Lat  see Yusra Medina0CP  martinez 1/20/17  
 [address] [place] [address]  
[phone]  
  
    
 2/9/2018 10:20 AM  
ESTABLISHED PATIENT with Tobin Lim MD  
[facility] ([facility]) Appt Note: bsc icd, rc, annual thresh/Lat  see Yusra Medina0CP  martinez 1/20/17  
 [address] [place] [address]  
[phone]  
  
    
  
 8/1/2017  9:30 AM  
REMOTE OFFICE VISIT with Soren Everett  
 [facility] (ANTHONY SCHEDULING) Appt Note: [facility] icd, rc  
 [address] [place]  
[place]  [address] [id number]  
  
    
 11/7/2017  8:00 AM  
REMOTE OFFICE VISIT with Rogelio Szymanski [facility] (ANTHONY SCHEDULING) Appt Note: [facility] icd, rc  
 [address] [place]  
[phone] Upcoming Health Maintenance Date Due DTaP/Tdap/Td series (1 - Tdap) 3/21/1972 GLAUCOMA SCREENING Q2Y 3/21/2016 FOBT Q 1 YEAR AGE 50-75 3/16/2017 BREAST CANCER SCRN MAMMOGRAM 6/9/2017 Pneumococcal 65+ Low/Medium Risk (2 of 2 - PCV13) 6/2/2017 MEDICARE YEARLY EXAM 6/3/2017 Allergies as of 3/6/2017  Review Complete On: 3/6/2017 By: Redd Bermeo LPN No Known Allergies Current Immunizations  Never Reviewed Name Date Pneumococcal Polysaccharide (PPSV-23) 6/2/2016 Not reviewed this visit Vitals Height(growth percentile) Weight(growth percentile) BMI OB Status Smoking Status 5' 2\" (1.575 m) 173 lb (78.5 kg) 31.64 kg/m2 Postmenopausal Never Smoker BMI and BSA Data Body Mass Index Body Surface Area  
 31.64 kg/m 2 1.85 m 2 Preferred Pharmacy Pharmacy Name Phone [facility] [address] [phone] Your Updated Medication List  
  
   
This list is accurate as of: 3/6/17  2:14 PM.  Always use your most recent med list.  
  
  
  
  
 acetaminophen-codeine 300-30 mg per tablet Commonly known as:  TYLENOL-CODEINE #3 Take 1 Tab by mouth every four (4) hours as needed for Pain. Max Daily Amount: 6 Tabs. amiodarone 200 mg tablet Commonly known as:  CORDARONE Take 1 Tab by mouth daily. aspirin delayed-release 81 mg tablet Take 81 mg by mouth every four (4) hours as needed for Pain. carvedilol 6.25 mg tablet Commonly known as:  Juan Willoughby  
 Take 1 Tab by mouth two (2) times daily (with meals). furosemide 20 mg tablet Commonly known as:  LASIX TAKE 1 TABLET BY MOUTH DAILY losartan 25 mg tablet Commonly known as:  COZAAR Take 1 Tab by mouth daily. sertraline 50 mg tablet Commonly known as:  ZOLOFT  
TAKE 1 TABLET BY MOUTH DAILY  
  
 spironolactone 25 mg tablet Commonly known as:  ALDACTONE Take 1 Tab by mouth daily. Indications: CHRONIC HEART FAILURE  
  
 XARELTO 20 mg Tab tablet Generic drug:  rivaroxaban TAKE 1 TABLET BY MOUTH EVERY DAY To-Do List   
 03/27/2017 12:30 PM  
  Appointment with [facility] DE[facility]BI 2 at [address] [place] [address] ([phone]) Shower or bathe using soap and water. Do not use deodorant, powder, perfumes, or lotion the day of your exam.  If your prior mammograms were not performed at [facility] 6 please bring films with you or forward prior images 2 days before your procedure. Check in at registration 15min before your appointment time unless you were instructed to do otherwise. A script is not necessary for screening. If you have one, please bring it on the day of the mammogram or have it faxed to the department. [facility]  [phone] [facility] [phone] [facility] [phone] [facility] [phone] Patient Instructions Follow up with Dr. Yg Zavala in 3 months. Introducing [facility] [place] [facility]! B[employer] introduces [employer] patient portal. Now you can access parts of your medical record, email your doctor's office, and request medication refills online. 1. In your internet browser, go to [email or web address] 2. Click on the First Time User? Click Here link in the Sign In box. You will see the New Member Sign Up page. 3. Enter your [employer] Access Code exactly as it appears below. You will not need to use this code after youve completed the sign-up process. If you do not sign up before the expiration date, you must request a new code. · [employer] Access Code: [email or web address] Expires: 4/20/2017 10:29 AM 
 
4. Enter the last four digits of your Social Security Number (xxxx) and Date of Birth (mm/dd/yyyy) as indicated and click Submit. You will be taken to the next sign-up page. 5. Create a [employer] ID. This will be your [employer] login ID and cannot be changed, so think of one that is secure and easy to remember. 6. Create a [employer] password. You can change your password at any time. 7. Enter your Password Reset Question and Answer. This can be used at a later time if you forget your password. 8. Enter your e-mail address. You will receive e-mail notification when new information is available in [address] 9. Click Sign Up. You can now view and download portions of your medical record. 10. Click the Download Summary menu link to download a portable copy of your medical information. If you have questions, please visit the Frequently Asked Questions section of the [employer] website. Remember, [employer] is NOT to be used for urgent needs. For medical emergencies, dial 911. Now available from your iPhone and Android! Please provide this summary of care documentation to your next provider. Your primary care clinician is listed as Sofia Perdomo. If you have any questions after today's visit, please call [phone].

## 2017-03-06 NOTE — PROGRESS NOTES
called me Friday night and wants wound check as he sees yellow drainage from BIV ICD site  So I told them to stop by Monday 2 pm

## 2017-03-08 ENCOUNTER — PATIENT OUTREACH (OUTPATIENT)
Dept: FAMILY MEDICINE CLINIC | Age: 67
End: 2017-03-08

## 2017-03-16 ENCOUNTER — DOCUMENTATION ONLY (OUTPATIENT)
Dept: CARDIOLOGY CLINIC | Age: 67
End: 2017-03-16

## 2017-03-16 NOTE — PROGRESS NOTES
Received request for clearance for local anesthesia/dental procedure for this patient. Clearance completed, signed by Rachell Casanova NP and was faxed with office notes to [facility] at [phone]. Fax confirmation received.

## 2017-04-20 NOTE — TELEPHONE ENCOUNTER
Patient calling to check on rx. She is completely out and needs this refill today if at all possible.

## 2017-06-19 ENCOUNTER — OFFICE VISIT (OUTPATIENT)
Dept: CARDIOLOGY CLINIC | Age: 67
End: 2017-06-19

## 2017-06-19 ENCOUNTER — CLINICAL SUPPORT (OUTPATIENT)
Dept: CARDIOLOGY CLINIC | Age: 67
End: 2017-06-19

## 2017-06-19 VITALS
SYSTOLIC BLOOD PRESSURE: 100 MMHG | WEIGHT: 167.2 LBS | HEART RATE: 68 BPM | BODY MASS INDEX: 30.77 KG/M2 | DIASTOLIC BLOOD PRESSURE: 60 MMHG | HEIGHT: 62 IN

## 2017-06-19 DIAGNOSIS — I42.9 IDIOPATHIC CARDIOMYOPATHY (HCC): Primary | ICD-10-CM

## 2017-06-19 DIAGNOSIS — I48.0 PAF (PAROXYSMAL ATRIAL FIBRILLATION) (HCC): ICD-10-CM

## 2017-06-19 DIAGNOSIS — I47.29 PAROXYSMAL VENTRICULAR TACHYCARDIA: ICD-10-CM

## 2017-06-19 DIAGNOSIS — Z95.810 PRESENCE OF BIVENTRICULAR AUTOMATIC CARDIOVERTER/DEFIBRILLATOR (AICD): Primary | ICD-10-CM

## 2017-06-19 DIAGNOSIS — Z79.899 ON AMIODARONE THERAPY: ICD-10-CM

## 2017-06-19 DIAGNOSIS — Z95.810 PRESENCE OF BIVENTRICULAR AUTOMATIC CARDIOVERTER/DEFIBRILLATOR (AICD): ICD-10-CM

## 2017-06-19 DIAGNOSIS — I50.22 CHF NYHA CLASS II (SYMPTOMS WITH MODERATELY STRENUOUS ACTIVITIES), CHRONIC, SYSTOLIC (HCC): ICD-10-CM

## 2017-06-19 RX ORDER — FUROSEMIDE 20 MG/1
TABLET ORAL
Qty: 90 TAB | Refills: 1 | Status: SHIPPED | OUTPATIENT
Start: 2017-06-19 | End: 2017-06-21 | Stop reason: SDUPTHER

## 2017-06-19 NOTE — PROGRESS NOTES
Subjective:       CARDIAC ELECTROPHYSIOLOGY CLINIC    Cece Nino is a 78 y.o. female who is here with her  for follow up of chronic heart failure. She has no recent atrial fibrillation. Biventricular pacemaker AICD showed episodes of PMT or atrial tachycardia. She is asymptomatic with palpitation. 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 However, she has not been hospitalized for congestive heart failure. Stress test showed appropriate sinus response but she had PVCs and 4 beat NSVT. She has paroxysmal atrial fibrillation, chronic idiopathic cardiomyopathy EF 30% in the past but last year in 2/2015 she had abnormal nuclear stress test with EF 31% and echo EF 38% she had cardiac cath with Dr Jv Danielle and she had normal coronary arteries and LVEF 25%. She had IVCD/LBBB type  She then had a new atrial lead removal (atrial lead noise known for a long time) and BIV ICD upgrade (new LV lead) 6/21/16. She says she has been doing well. No recent hospitalizations for CHF. No LE edema, abdominal bloating, chest pain or palpitations. She takes amiodarone, she admits she has not had a eye exam.   She takes xarelto for embolic CVA prevention.  She denies blood in the stool or urine      Cardiac testing   Cardiac cath 2/26/15 Normal coronaries, severely reduced LV function estimated EF 25%          [facility] cardiolite stress test 2/19/2015 showed atrial pacing, ventricular sensing with IVCD, diffuse nonspecific STT wave changes and PVCs  Myocardial perfusion images showed a large but partially reversible ischemia in anterior, anteroseptal and anterolateral walls consistent with ischemia in the LAD territory  LVEF 31%                           Problem List  Date Reviewed: 6/19/2017          Codes Class Noted    Pacemaker lead malfunction ICD-10-CM: T82.110A  ICD-9-CM: 996.01  6/21/2016        Chronic systolic congestive heart failure, NYHA class 3 ([facility] ICD-10-CM: I50.22  ICD-9-CM: 428.22, 428.0  6/21/2016        Biventricular ICD (implantable cardioverter-defibrillator) in place ICD-10-CM: Z95.810  ICD-9-CM: V45.02  6/21/2016    Overview Addendum 2/22/2017  2:58 PM by Yash Fierro MD     Upgrade from ICD [employer]  Right atrial lead extraction and reimplant 6/21/2016  RA lead dislodgment and replaced 2/22/2017             Left bundle branch block (LBBB) on electrocardiogram ICD-10-CM: I44.7  ICD-9-CM: 426.3  6/21/2016        Advanced care planning/counseling discussion ICD-10-CM: Z71.89  ICD-9-CM: V65.49  6/2/2016    Overview Signed 6/2/2016 10:06 AM by Bolivar Linda MD     Discussed naming a decision maker. Patient not able to make a decision at this time. Depression ICD-10-CM: F32.9  ICD-9-CM: 347  8/28/2015        Hypertension ICD-10-CM: I10  ICD-9-CM: 401.9  8/28/2015        History of amiodarone therapy ICD-10-CM: Z92.29  ICD-9-CM: V87.49  12/30/2011        Idiopathic cardiomyopathy (HCC) ICD-10-CM: I42.8  ICD-9-CM: 425.4  Unknown    Overview Addendum 2/7/2017  9:50 AM by Guadalupe Gibbs NP     Non ischemic by cath 3/15. EF 21% 11/16 by echo  2/6/17 Stress test showed appropriate sinus response but she had PVCs and 4 beat NSVT. Automatic implantable cardiac defibrillator in situ ICD-10-CM: Z95.810  ICD-9-CM: V45.02  Unknown    Overview Addendum 7/30/2012  8:51 AM by Yash Fierro MD     ICD DFT test at 26 J converted VF to NSR successfully (she is on Amiodarone) 5/13/2011  Lead noises with isometric arm activity.   Fluoroscopy 7/30/2012 does not show fracture of disconnection             Paroxysmal ventricular tachycardia (HCC) ICD-10-CM: I47.2  ICD-9-CM: 427.1  Unknown        PAF (paroxysmal atrial fibrillation) ([address] ICD-10-CM: I48.0  ICD-9-CM: 427.31  Unknown    Overview Addendum 4/2/2011  3:42 PM by Yash Fierro MD     ICD shock 3/28/2011 for rapid Afib  ICD shock 6/3/2010  (ICD with ATP for VT in the past)                 Current Outpatient Prescriptions   Medication Sig    rivaroxaban (XARELTO) 20 mg tab tablet TAKE 1 TABLET BY MOUTH EVERY DAY    amiodarone (CORDARONE) 200 mg tablet Take 1 Tab by mouth daily.  losartan (COZAAR) 25 mg tablet Take 1 Tab by mouth daily.  carvedilol (COREG) 6.25 mg tablet Take 1 Tab by mouth two (2) times daily (with meals).  spironolactone (ALDACTONE) 25 mg tablet Take 1 Tab by mouth daily. Indications: CHRONIC HEART FAILURE    sertraline (ZOLOFT) 50 mg tablet TAKE 1 TABLET BY MOUTH DAILY    acetaminophen-codeine (TYLENOL-CODEINE #3) 300-30 mg per tablet Take 1 Tab by mouth every four (4) hours as needed for Pain. Max Daily Amount: 6 Tabs.  furosemide (LASIX) 20 mg tablet TAKE 1 TABLET BY MOUTH DAILY     No current facility-administered medications for this visit. No Known Allergies  Past Medical History   Diagnosis Date    Congestive heart failure, unspecified     Cardiomyopathy, nonischemic     Atrial fibrillation     ICD (implantable cardiac defibrillator) in place 9/18/09     [facility]    [address] [place] [address] History of amiodarone therapy 12/30/2011    ICD (implantable cardioverter-defibrillator) in place      Past Surgical History   Procedure Laterality Date    Hx heart catheterization  2006     no CAD, EF 25%    Echo 2d adult  4/1/2011     EF 30%, mild to moderate LA enlargement, mild MR    Ir fluoroscopy < 1 hour  7/30/2012          Hx pacemaker placement  2009    Hx pacemaker         History   Substance Use Topics    Smoking status: Never Smoker     Smokeless tobacco: Never Used    Alcohol Use: 2.5 oz/week     5 Cans of beer per week      Comment: occasional      Review of Systems    Constitutional: Negative for fever, chills, weight loss, malaise/fatigue  HEENT: Negative for nosebleeds, and vision changes. Tooth extraction coming up  Respiratory: Negative for cough, hemoptysis, sputum production, and wheezing.    Cardiovascular: Negative for chest pain, palpitations, leg swelling, no syncope, and PND. Gastrointestinal: Negative for nausea, vomiting, blood in stool and melena. Genitourinary: Negative for hematuria. Musculoskeletal: Negative for myalgias. Skin: Negative for rash and itching. Heme: Does not bleed or bruise easily. Neurological: Negative for speech change and focal weakness      Objective:     Visit Vitals    /60 (BP 1 Location: Left arm)    Pulse 68    Ht 5' 2\" (1.575 m)    Wt 167 lb 3.2 oz (75.8 kg)    BMI 30.58 kg/m2       Physical Exam:   General:  alert, cooperative, no distress, appears stated age   Neck: carotids upstroke normal bilaterally, no bruits, no JVD   Chest Wall: inspection showed normal left sided BIV ICD site   Lung: clear to auscultation bilaterally   Heart:  normal rate, regular rhythm,no rubs, clicks or gallops   Abdomen: soft, mild obesity. Extremities: no edema   Skin: left sided biventircular ICD site unremarkable, thin scar          Assessment/Plan:       ICD-10-CM ICD-9-CM    1. Idiopathic cardiomyopathy ([facility]) I42.8 425.4    2. Presence of biventricular automatic cardioverter/defibrillator (AICD) Z95.810 V45.02    3. PAF (paroxysmal atrial fibrillation) ([facility]) I48.0 427.31    4. On amiodarone therapy Z79.899 V58.69    5. Paroxysmal ventricular tachycardia ([facility]) I47.2 427.1    6. CHF NYHA class II (symptoms with moderately strenuous activities), chronic, systolic I08.14 289.43      428.0    Last November echocardiogram showed EF between 20-25%. She is pacing CRT 99% now. I recommend she change Losartan to Entresto bid at low dose since her blood pressure is not very high and she agreed to proceed with that. Device showed proper function  I discussed with her      Follow-up Disposition:  Return in about 6 months (around 12/19/2017). Zackary Mace M.D.  [facility]  Electrophysiology/Cardiology  [facility]  [place] [address], [address] [facility], [address] [place] [address]  [phone]

## 2017-06-19 NOTE — PATIENT INSTRUCTIONS
Stop Losartan/Cozaar. Start Entresto 24/26mg twice a day. You will need to follow up in clinic with Dr. Ying Campos in 6 months.

## 2017-06-19 NOTE — MR AVS SNAPSHOT
Visit Information Date & Time Provider Department Dept. Phone Encounter #  
 6/19/2017  2:00 PM Rigo Nix [phone] Follow-up Instructions Return in about 6 months (around 12/19/2017). Your Appointments 7/25/2017 10:15 AM  
PHYSICAL PRE OP with Carmen Johnson MD  
[address] ([address]) Appt Note: [address], [address] [phone]  
[address], [place] [address] [address]  
  
    
 6/28/2018 10:45 AM  
PACEMAKER with Diaz Díaz [facility] ([place] SCHEDULING) Appt Note: higinio sci BiV ICD/rc/Escobar 1 yr b   LAT  
 [address] [place] [address]  
[address] [place]  
  
    
 6/28/2018 11:00 AM  
ESTABLISHED PATIENT with Rigo Nix MD  
[facility] ([address]) Appt Note: higinio sci BiV ICD/rc/Escobar 1 yr b   LAT  
 [address] [place] [address]  
[address] [place] [address]  
  
    
  
 9/20/2017 10:45 AM  
REMOTE OFFICE VISIT with Andrew Rachele [facility] ([place] SCHEDULING) Appt Note: LAT BiV ICD/rc b 6-19-17  
 [address] [id number]  
[address] [place] [address]  
  
    
 12/20/2017  2:00 PM  
REMOTE OFFICE VISIT with Andrew Rachele [facility] ([place] SCHEDULING) Appt Note: LAT BiV ICD/rc b; LAT BiV ICD/rc b  
 [address] [place]  
[phone]  
  
    
 3/26/2018 10:15 AM  
REMOTE OFFICE VISIT with Andrew Rachele [facility] ([place] SCHEDULING) Appt Note: LAT BiV ICD/rc b  
 [address] [place]  
[phone] Upcoming Health Maintenance Date Due DTaP/Tdap/Td series (1 - Tdap) 3/21/1972 GLAUCOMA SCREENING Q2Y 3/21/2016 FOBT Q 1 YEAR AGE 50-75 3/16/2017 Pneumococcal 65+ Low/Medium Risk (2 of 2 - PCV13) 6/2/2017 MEDICARE YEARLY EXAM 6/3/2017 BREAST CANCER SCRN MAMMOGRAM 6/9/2017 INFLUENZA AGE 9 TO ADULT 8/1/2017 Allergies as of 6/19/2017  Review Complete On: 6/19/2017 By: Josesito Hair MD  
 No Known Allergies Current Immunizations  Never Reviewed Name Date Pneumococcal Polysaccharide (PPSV-23) 6/2/2016 Not reviewed this visit You Were Diagnosed With   
  
 Codes Comments Idiopathic cardiomyopathy ([facility] [address]    -  Primary ICD-10-CM: I42.8 ICD-9-CM: 425.4 Presence of biventricular automatic cardioverter/defibrillator (AICD)     ICD-10-CM: Z95.810 ICD-9-CM: V45.02   
 PAF (paroxysmal atrial fibrillation) (HCC)     ICD-10-CM: I48.0 ICD-9-CM: 427.31 On amiodarone therapy     ICD-10-CM: Z79.899 ICD-9-CM: V58.69 Paroxysmal ventricular tachycardia (HCC)     ICD-10-CM: I47.2 ICD-9-CM: 427.1 CHF NYHA class II (symptoms with moderately strenuous activities), chronic, systolic     DJN-68-IF: F64.09 ICD-9-CM: 428.22, 428.0 Vitals BP Pulse Height(growth percentile) Weight(growth percentile) BMI OB Status 100/60 (BP 1 Location: Left arm) 68 5' 2\" (1.575 m) 167 lb 3.2 oz (75.8 kg) 30.58 kg/m2 Postmenopausal  
 Smoking Status Never Smoker Vitals History BMI and BSA Data Body Mass Index Body Surface Area 30.58 kg/m 2 1.82 m 2 Preferred Pharmacy Pharmacy Name Phone [facility] [address] [phone] Your Updated Medication List  
  
   
This list is accurate as of: 6/19/17  2:06 PM.  Always use your most recent med list.  
  
  
  
  
 amiodarone 200 mg tablet Commonly known as:  CORDARONE Take 1 Tab by mouth daily. carvedilol 6.25 mg tablet Commonly known as:  Llana Milo Take 1 Tab by mouth two (2) times daily (with meals). furosemide 20 mg tablet Commonly known as:  LASIX TAKE 1 TABLET BY MOUTH DAILY  
  
 rivaroxaban 20 mg Tab tablet Commonly known as:  Corean Miser TAKE 1 TABLET BY MOUTH EVERY DAY  
  
 sacubitril-valsartan 24-26 mg tablet Commonly known as:  ENTRESTO Take 1 Tab by mouth two (2) times a day. sertraline 50 mg tablet Commonly known as:  ZOLOFT  
TAKE 1 TABLET BY MOUTH DAILY  
  
 spironolactone 25 mg tablet Commonly known as:  ALDACTONE Take 1 Tab by mouth daily. Indications: CHRONIC HEART FAILURE Prescriptions Sent to Pharmacy Refills  
 furosemide (LASIX) 20 mg tablet 1 Sig: TAKE 1 TABLET BY MOUTH DAILY Class: Normal  
 Pharmacy: [facility] Ph #: [phone]  
 sacubitril-valsartan (ENTRESTO) 24 mg/26 mg tablet 0 Sig: Take 1 Tab by mouth two (2) times a day. Class: Normal  
 Pharmacy: [facility] Ph #: [phone] Route: Oral  
  
Follow-up Instructions Return in about 6 months (around 12/19/2017). Patient Instructions Stop Losartan/Cozaar. Start Entresto 24/26mg twice a day. You will need to follow up in clinic with Dr. Ap Velasquez in 6 months. Introducing [facility]! [facility] introduces CareParent patient portal. Now you can access parts of your medical record, email your doctor's office, and request medication refills online. 1. In your internet browser, go to [email or web address] 2. Click on the First Time User? Click Here link in the Sign In box. You will see the New Member Sign Up page. 3. Enter your CareParent Access Code exactly as it appears below. You will not need to use this code after youve completed the sign-up process. If you do not sign up before the expiration date, you must request a new code. · Slingbox Access Code: [email or web address] Expires: 9/17/2017  2:06 PM 
 
4. Enter the last four digits of your Social Security Number (xxxx) and Date of Birth (mm/dd/yyyy) as indicated and click Submit. You will be taken to the next sign-up page. 5. Create a Slingbox ID. This will be your Slingbox login ID and cannot be changed, so think of one that is secure and easy to remember. 6. Create a Slingbox password. You can change your password at any time. 7. Enter your Password Reset Question and Answer. This can be used at a later time if you forget your password. 8. Enter your e-mail address. You will receive e-mail notification when new information is available in [address] 9. Click Sign Up. You can now view and download portions of your medical record. 10. Click the Download Summary menu link to download a portable copy of your medical information. If you have questions, please visit the Frequently Asked Questions section of the Slingbox website. Remember, Slingbox is NOT to be used for urgent needs. For medical emergencies, dial 911. Now available from your iPhone and Android! Please provide this summary of care documentation to your next provider. Your primary care clinician is listed as Carmen Johnson. If you have any questions after today's visit, please call [phone].

## 2017-06-21 RX ORDER — FUROSEMIDE 20 MG/1
TABLET ORAL
Qty: 90 TAB | Refills: 1 | Status: SHIPPED | OUTPATIENT
Start: 2017-06-21 | End: 2018-01-01 | Stop reason: SDUPTHER

## 2017-06-21 NOTE — TELEPHONE ENCOUNTER
Request for lasix 20mg every day. Last office visit 6/19/17, next office visit 6/28/18. Refills per verbal order from Dr. Francisca Lopez.

## 2017-07-03 RX ORDER — SPIRONOLACTONE 25 MG/1
25 TABLET ORAL DAILY
Qty: 30 TAB | Refills: 6 | Status: SHIPPED | OUTPATIENT
Start: 2017-07-03 | End: 2018-01-01 | Stop reason: SDUPTHER

## 2017-07-03 NOTE — TELEPHONE ENCOUNTER
Request for spironolactone 25mg every day. Last office visit 6/21/17, next office visit 6/28/18. Refills per verbal order from Dr. Efrain Borges.

## 2017-07-10 RX ORDER — AMIODARONE HYDROCHLORIDE 200 MG/1
200 TABLET ORAL DAILY
Qty: 30 TAB | Refills: 6 | Status: SHIPPED | OUTPATIENT
Start: 2017-07-10 | End: 2018-01-01 | Stop reason: SDUPTHER

## 2017-07-10 NOTE — TELEPHONE ENCOUNTER
Request for amiodarone 200mg every day. Last office visit 6/19/17, next office visit 12/18/17. Refills per verbal order from Dr. Magdalena Benavidez.

## 2017-07-25 ENCOUNTER — OFFICE VISIT (OUTPATIENT)
Dept: FAMILY MEDICINE CLINIC | Age: 67
End: 2017-07-25

## 2017-07-25 ENCOUNTER — HOSPITAL ENCOUNTER (OUTPATIENT)
Dept: LAB | Age: 67
Discharge: HOME | End: 2017-07-25
Payer: MEDICARE

## 2017-07-25 VITALS
OXYGEN SATURATION: 98 % | RESPIRATION RATE: 16 BRPM | DIASTOLIC BLOOD PRESSURE: 57 MMHG | WEIGHT: 165 LBS | TEMPERATURE: 98.1 F | HEIGHT: 62 IN | BODY MASS INDEX: 30.36 KG/M2 | HEART RATE: 60 BPM | SYSTOLIC BLOOD PRESSURE: 94 MMHG

## 2017-07-25 DIAGNOSIS — I10 ESSENTIAL HYPERTENSION: ICD-10-CM

## 2017-07-25 DIAGNOSIS — I50.22 CHRONIC SYSTOLIC CONGESTIVE HEART FAILURE, NYHA CLASS 3 (HCC): ICD-10-CM

## 2017-07-25 DIAGNOSIS — I48.0 PAF (PAROXYSMAL ATRIAL FIBRILLATION) (HCC): ICD-10-CM

## 2017-07-25 DIAGNOSIS — Z12.31 VISIT FOR SCREENING MAMMOGRAM: ICD-10-CM

## 2017-07-25 DIAGNOSIS — Z12.11 COLON CANCER SCREENING: ICD-10-CM

## 2017-07-25 DIAGNOSIS — Z00.00 GENERAL MEDICAL EXAM: Primary | ICD-10-CM

## 2017-07-25 DIAGNOSIS — Z23 ENCOUNTER FOR IMMUNIZATION: ICD-10-CM

## 2017-07-25 DIAGNOSIS — Z13.39 SCREENING FOR ALCOHOLISM: ICD-10-CM

## 2017-07-25 DIAGNOSIS — I42.9 IDIOPATHIC CARDIOMYOPATHY (HCC): ICD-10-CM

## 2017-07-25 PROCEDURE — 36415 COLL VENOUS BLD VENIPUNCTURE: CPT

## 2017-07-25 PROCEDURE — 84443 ASSAY THYROID STIM HORMONE: CPT

## 2017-07-25 PROCEDURE — 80053 COMPREHEN METABOLIC PANEL: CPT

## 2017-07-25 PROCEDURE — 80061 LIPID PANEL: CPT

## 2017-07-25 PROCEDURE — 85025 COMPLETE CBC W/AUTO DIFF WBC: CPT

## 2017-07-25 NOTE — PROGRESS NOTES
This is a Subsequent Medicare Annual Wellness Visit providing Personalized Prevention Plan Services (PPPS) (Performed 12 months after initial AWV and PPPS )    I have reviewed the patient's medical history in detail and updated the computerized patient record. History     Past Medical History:   Diagnosis Date    Atrial fibrillation ([address]     Cardiomyopathy, nonischemic (HCC)     Congestive heart failure, unspecified     Depression     History of amiodarone therapy 12/30/2011    ICD (implantable cardiac defibrillator) in place 9/18/09    [facility]      Past Surgical History:   Procedure Laterality Date    ECHO 2D ADULT  4/1/2011    EF 30%, mild to moderate LA enlargement, mild MR    HX HEART CATHETERIZATION  2006    no CAD, EF 25%    HX PACEMAKER      HX PACEMAKER PLACEMENT  2009    IR FLUOROSCOPY < 1 HOUR  7/30/2012         [place] REMV TRANSVEN PACER ELECTRODE,1 LEAD  6/21/2016          Social History   Substance Use Topics    Smoking status: Never Smoker    Smokeless tobacco: Never Used    Alcohol use 2.5 oz/week     5 Cans of beer per week      Comment: occasional     Current Outpatient Prescriptions   Medication Sig Dispense Refill    amiodarone (CORDARONE) 200 mg tablet Take 1 Tab by mouth daily. 30 Tab 6    spironolactone (ALDACTONE) 25 mg tablet Take 1 Tab by mouth daily. Indications: Chronic Heart Failure 30 Tab 6    furosemide (LASIX) 20 mg tablet TAKE 1 TABLET BY MOUTH DAILY 90 Tab 1    rivaroxaban (XARELTO) 20 mg tab tablet TAKE 1 TABLET BY MOUTH EVERY DAY 90 Tab 3    carvedilol (COREG) 6.25 mg tablet Take 1 Tab by mouth two (2) times daily (with meals). 60 Tab 6    sertraline (ZOLOFT) 50 mg tablet TAKE 1 TABLET BY MOUTH DAILY 90 Tab 4    sacubitril-valsartan (ENTRESTO) 24 mg/26 mg tablet Take 1 Tab by mouth two (2) times a day.  180 Tab 1     No Known Allergies  Family History   Problem Relation Age of Onset    High Cholesterol Mother        Patient Active Problem List    Diagnosis    Chronic systolic congestive heart failure, NYHA class 3 (HCC)-feeling good today. No recent shortness of breath, lower extremity edema, orthopnea, or PND. Taking medications as directed. Active in her garden.  Biventricular ICD (implantable cardioverter-defibrillator) in place     Upgrade from ICD [employer]  Right atrial lead extraction and reimplant 6/21/2016  RA lead dislodgment and replaced 2/22/2017  Denies any palpable arrhythmias.  Left bundle branch block (LBBB) on electrocardiogram    Depression -activity level has improved, no more tearful spells. Sleeping and eating well.  Hypertension -No home monitoring. Compliant with medication. No shortness of breath, no chest pain, no vision change, no headache, no lower extremity edema.  History of amiodarone therapy    Idiopathic cardiomyopathy ([address]     Non ischemic by cath 3/15. EF 21% 11/16 by echo  2/6/17 Stress test showed appropriate sinus response but she had PVCs and 4 beat NSVT.  Automatic implantable cardiac defibrillator in situ     ICD DFT test at 27 J converted VF to NSR successfully (she is on Amiodarone) 5/13/2011  Lead noises with isometric arm activity.   Fluoroscopy 7/30/2012 does not show fracture of disconnection      Paroxysmal ventricular tachycardia (HCC)    PAF (paroxysmal atrial fibrillation) ([address]     ICD shock 3/28/2011 for rapid Afib  ICD shock 6/3/2010  (ICD with ATP for VT in the past)         Patient Care Team:  Pablito Rosales MD as PCP - General (Internal Medicine)  Volodymyr Herrera MD (Cardiology)  Kev Rodríguez, RN as Ambulatory Care Navigator    Depression Risk Factor Screening:     PHQ over the last two weeks 7/25/2017   Little interest or pleasure in doing things Not at all   Feeling down, depressed or hopeless Not at all   Total Score PHQ 2 0   Trouble falling or staying asleep, or sleeping too much Not at all   Feeling tired or having little energy Not at all Poor appetite or overeating Not at all   Feeling bad about yourself - or that you are a failure or have let yourself or your family down Not at all   Trouble concentrating on things such as school, work, reading or watching TV Not at all   Moving or speaking so slowly that other people could have noticed; or the opposite being so fidgety that others notice Not at all   Thoughts of being better off dead, or hurting yourself in some way Not at all   PHQ 9 Score 0   How difficult have these problems made it for you to do your work, take care of your home and get along with others Not difficult at all     Alcohol Risk Factor Screening: On any occasion during the past 3 months, have you had more than 3 drinks containing alcohol? Yes    Do you average more than 7 drinks per week? Yes      Functional Ability and Level of Safety:     Fall Risk   Fall Risk Assessment, last 12 mths 7/25/2017   Able to walk? Yes   Fall in past 12 months? No       Hearing Loss   mild    Activities of Daily Living   Self-care. ADL Assessment 7/25/2017   Feeding yourself No Help Needed   Getting from bed to chair No Help Needed   Getting dressed No Help Needed   Bathing or showering No Help Needed   Walk across the room (includes cane/walker) No Help Needed   Using the telphone No Help Needed   Taking your medications No Help Needed   Preparing meals No Help Needed   Managing money (expenses/bills) No Help Needed   Moderately strenuous housework (laundry) No Help Needed   Shopping for personal items (toiletries/medicines) No Help Needed   Shopping for groceries No Help Needed   Driving No Help Needed   Climbing a flight of stairs No Help Needed   Getting to places beyond walking distances No Help Needed       Abuse Screen   Patient is not abused    Social History     Social History Narrative    Lives with        Review of Systems   A comprehensive review of systems was negative except for that written in the HPI.     Physical Examination     Evaluation of Cognitive Function:  Mood/affect:  happy  Appearance: age appropriate  Family member/caregiver input: none    Visit Vitals    BP 94/57 (BP 1 Location: Left arm, BP Patient Position: Sitting)    Pulse 60    Temp 98.1 °F (36.7 °C) (Oral)    Resp 16    Ht 5' 2\" (1.575 m)    Wt 165 lb (74.8 kg)    SpO2 98%    BMI 30.18 kg/m2     Gen: alert, oriented, no acute distress  Ears: external auditory canals clear, TMs without erythema or effusion  Eyes: pupils equal round reactive to light, sclera clear, conjunctiva clear  Oral: moist mucus membranes, no oral lesions, no pharyngeal inflammation or exudate  Neck: thyroid symmetric and not enlarged, no carotid bruits, no jugular vein distention  Resp: no increase work of breathing, lungs clear to ausculation bilaterally, no wheezing, rales or rhonchi  CV: Irregularly irregular. No murmurs, rubs, or gallops. Abd: soft, not tender, not distended. No hepatosplenomegaly. Normal bowel sounds. Neuro: cranial nerves intact, normal strength and movement in all extremities, sensation intact and symmetric. Skin: no lesion or rash  Extremities: no cyanosis or edema        Advice/Referrals/Counseling   Education and counseling provided:  Are appropriate based on today's review and evaluation  Pneumococcal Vaccine  Influenza Vaccine  Colorectal cancer screening tests      Assessment/Plan     1. General medical exam  Age appropriate health maintenance measures discussed with patient and ordered. 2. Colon cancer screening  - OCCULT BLOOD, IMMUNOASSAY (FIT)    3. Visit for screening mammogram  - [facility] 3D FITZ W MAMMO BI SCREENING INCL CAD; Future    4. Screening for alcoholism    5. Idiopathic cardiomyopathy ([facility] [address]  Well compensated on current medications. 6. PAF (paroxysmal atrial fibrillation) (HCC)  Rate controlled and anticoagulated. - CBC WITH AUTOMATED DIFF  - METABOLIC PANEL, COMPREHENSIVE  - LIPID PANEL  - TSH 3RD GENERATION    7. Chronic systolic congestive heart failure, NYHA class 3 (HCC)  Well maintained on current medicines. No changes. - CBC WITH AUTOMATED DIFF  - METABOLIC PANEL, COMPREHENSIVE  - LIPID PANEL  - TSH 3RD GENERATION    8. Essential hypertension  At goal on current medicines. - CBC WITH AUTOMATED DIFF  - METABOLIC PANEL, COMPREHENSIVE  - LIPID PANEL  - TSH 3RD GENERATION    9. Encounter for immunization  - PNEUMOCOCCAL CONJ VACCINE 13 VALENT IM (Age 48 and over)  - ADMIN PNEUMOCOCCAL VACCINE  (MEDICARE ONLY)      Recommended healthy diet low in carbohydrates, fats, sodium and cholesterol. Recommended regular cardiovascular exercise 3-6 times per week for 30-60 minutes daily. Current Outpatient Prescriptions   Medication Sig Dispense Refill    amiodarone (CORDARONE) 200 mg tablet Take 1 Tab by mouth daily. 30 Tab 6    spironolactone (ALDACTONE) 25 mg tablet Take 1 Tab by mouth daily. Indications: Chronic Heart Failure 30 Tab 6    furosemide (LASIX) 20 mg tablet TAKE 1 TABLET BY MOUTH DAILY 90 Tab 1    rivaroxaban (XARELTO) 20 mg tab tablet TAKE 1 TABLET BY MOUTH EVERY DAY 90 Tab 3    carvedilol (COREG) 6.25 mg tablet Take 1 Tab by mouth two (2) times daily (with meals). 60 Tab 6    sertraline (ZOLOFT) 50 mg tablet TAKE 1 TABLET BY MOUTH DAILY 90 Tab 4    sacubitril-valsartan (ENTRESTO) 24 mg/26 mg tablet Take 1 Tab by mouth two (2) times a day. 180 Tab 1       Verbal and written instructions (see AVS) provided. Patient expresses understanding of diagnosis and treatment plan.     Leslee Suarez MD

## 2017-07-25 NOTE — MR AVS SNAPSHOT
Visit Information Date & Time Provider Department Dept. Phone Encounter #  
 7/25/2017 10:15 AM Pablito Rosales MD [address] [facility] [phone] [id number] Your Appointments 12/18/2017  2:00 PM  
ESTABLISHED PATIENT with Volodymyr Herrera MD  
[facility] ([facility]) Appt Note: 6 month follow up  
 [address] [id number] [place]  
[address] [address]  
  
    
 6/28/2018 10:45 AM  
PACEMAKER with Yusra Vaughn [facility] (ANTHONY SCHEDULING) Appt Note: higinio sci BiV ICD/rc/Escobar 1 yr b   LAT  
 [address] [address] [place]  
[address] [place]  
  
    
 6/28/2018 11:00 AM  
ESTABLISHED PATIENT with Vloodymyr Herrera MD  
[facility] ([facility]) Appt Note: higinio sci BiV ICD/rc/Escobar 1 yr b   LAT  
 [address] [address] [place]  
[phone]  
  
    
  
 9/20/2017 10:45 AM  
REMOTE OFFICE VISIT with [facility] (ANTHONY SCHEDULING) Appt Note: LAT BiV ICD/rc b 6-19-17  
 [address] [place] [id number]  
[address] [address]  
  
    
 12/20/2017  2:00 PM  
REMOTE OFFICE VISIT with [facility] (ANTHONY SCHEDULING) Appt Note: LAT BiV ICD/rc b; LAT BiV ICD/rc b  
 [address] [address] [place]  
[phone]  
  
    
 3/26/2018 10:15 AM  
REMOTE OFFICE VISIT with [facility] (ANTHONY SCHEDULING) Appt Note: LAT BiV ICD/rc b  
 [address] [address] [place]  
[phone] Upcoming Health Maintenance Date Due DTaP/Tdap/Td series (1 - Tdap) 3/21/1972 GLAUCOMA SCREENING Q2Y 3/21/2016 FOBT Q 1 YEAR AGE 50-75 3/16/2017 Pneumococcal 65+ Low/Medium Risk (2 of 2 - PCV13) 6/2/2017 MEDICARE YEARLY EXAM 6/3/2017 BREAST CANCER SCRN MAMMOGRAM 6/9/2017 INFLUENZA AGE 9 TO ADULT 8/1/2017 Allergies as of 7/25/2017  Review Complete On: 7/25/2017 By: Cosme Dance, MD  
 No Known Allergies Current Immunizations  Never Reviewed Name Date Pneumococcal Conjugate (PCV-13)  Incomplete Pneumococcal Polysaccharide (PPSV-23) 6/2/2016 Not reviewed this visit You Were Diagnosed With   
  
 Codes Comments General medical exam    -  Primary ICD-10-CM: Z00.00 ICD-9-CM: V70.9 Colon cancer screening     ICD-10-CM: Z12.11 ICD-9-CM: V76.51 Visit for screening mammogram     ICD-10-CM: Z12.31 
ICD-9-CM: V76.12 Screening for alcoholism     ICD-10-CM: Z13.89 ICD-9-CM: V79.1 Idiopathic cardiomyopathy ([facility] [address]     ICD-10-CM: I42.8 ICD-9-CM: 425.4 PAF (paroxysmal atrial fibrillation) (HCC)     ICD-10-CM: I48.0 ICD-9-CM: 427.31 Chronic systolic congestive heart failure, NYHA class 3 (HCC)     ICD-10-CM: I50.22 ICD-9-CM: 428.22, 428.0 Essential hypertension     ICD-10-CM: I10 
ICD-9-CM: 401.9 Encounter for immunization     ICD-10-CM: X43 ICD-9-CM: V03.89 Vitals BP Pulse Temp Resp Height(growth percentile) Weight(growth percentile) 94/57 (BP 1 Location: Left arm, BP Patient Position: Sitting) 60 98.1 °F (36.7 °C) (Oral) 16 5' 2\" (1.575 m) 165 lb (74.8 kg) SpO2 BMI OB Status Smoking Status 98% 30.18 kg/m2 Postmenopausal Never Smoker Vitals History BMI and BSA Data Body Mass Index Body Surface Area  
 30.18 kg/m 2 1.81 m 2 Preferred Pharmacy Pharmacy Name Phone [facility] [address] [phone] Your Updated Medication List  
  
   
This list is accurate as of: 7/25/17 11:24 AM.  Always use your most recent med list.  
  
  
  
 amiodarone 200 mg tablet Commonly known as:  CORDARONE Take 1 Tab by mouth daily. carvedilol 6.25 mg tablet Commonly known as:  Stan Fritter Take 1 Tab by mouth two (2) times daily (with meals). furosemide 20 mg tablet Commonly known as:  LASIX TAKE 1 TABLET BY MOUTH DAILY  
  
 rivaroxaban 20 mg Tab tablet Commonly known as:  Sisto Altagracia TAKE 1 TABLET BY MOUTH EVERY DAY  
  
 sacubitril-valsartan 24-26 mg tablet Commonly known as:  ENTRESTO Take 1 Tab by mouth two (2) times a day. sertraline 50 mg tablet Commonly known as:  ZOLOFT  
TAKE 1 TABLET BY MOUTH DAILY  
  
 spironolactone 25 mg tablet Commonly known as:  ALDACTONE Take 1 Tab by mouth daily. Indications: Chronic Heart Failure We Performed the Following ADMIN PNEUMOCOCCAL VACCINE [ HCP] CBC WITH AUTOMATED DIFF [64837 CPT(R)] LIPID PANEL [05393 CPT(R)] METABOLIC PANEL, COMPREHENSIVE [12984 CPT(R)] OCCULT BLOOD, IMMUNOASSAY (FIT) [id number] CPT(R)] PNEUMOCOCCAL CONJ VACCINE 13 VALENT IM [id number] CPT(R)] TSH 3RD GENERATION [69844 CPT(R)] To-Do List   
 07/25/2017 Imaging:  DEBBI 3D FITZ W MAMMO BI SCREENING INCL CAD Patient Instructions Vaccine Information Statement Pneumococcal Conjugate Vaccine (PCV13): What You Need to Know Many Vaccine Information Statements are available in [place] and other languages. See www.immunize.org/vis. Hojas de información Sobre Vacunas están disponibles en español y en muchos otros idiomas. Visite www.immunize.org/vis. 1. Why get vaccinated? Vaccination can protect both children and adults from pneumococcal disease. Pneumococcal disease is caused by bacteria that can spread from person to person through close contact. It can cause ear infections, and it can also lead to more serious infections of the: 
 Lungs (pneumonia),  Blood (bacteremia), and 
 Covering of the brain and spinal cord (meningitis). Pneumococcal pneumonia is most common among adults. Pneumococcal meningitis can cause deafness and brain damage, and it kills about 1 child in 10 who get it. Anyone can get pneumococcal disease, but children under 3years of age and adults 72 years and older, people with certain medical conditions, and cigarette smokers are at the highest risk. Before there was a vaccine, the [facility] saw: 
 more than 700 cases of meningitis, 
 about 13,000 blood infections, 
 about 5 million ear infections, and 
 about 200 deaths 
in children under 5 each year from pneumococcal disease. Since vaccine became available, severe pneumococcal disease in these children has fallen by 88%. About 18,000 older adults die of pneumococcal disease each year in the [place]. Treatment of pneumococcal infections with penicillin and other drugs is not as effective as it used to be, because some strains of the disease have become resistant to these drugs. This makes prevention of the disease, through vaccination, even more important. 2. PCV13 vaccine Pneumococcal conjugate vaccine (called PCV13) protects against 13 types of pneumococcal bacteria. PCV13 is routinely given to children at 2, 4, 6, and 1515 months of age. It is also recommended for children and adults 3to 59years of age with certain health conditions, and for all adults 72years of age and older. Your doctor can give you details. 3. Some people should not get this vaccine Anyone who has ever had a life-threatening allergic reaction to a dose of this vaccine, to an earlier pneumococcal vaccine called PCV7, or to any vaccine containing diphtheria toxoid (for example, DTaP), should not get PCV13. Anyone with a severe allergy to any component of PCV13 should not get the vaccine. Tell your doctor if the person being vaccinated has any severe allergies.  
 
If the person scheduled for vaccination is not feeling well, your healthcare provider might decide to reschedule the shot on another day. 4. Risks of a vaccine reaction With any medicine, including vaccines, there is a chance of reactions. These are usually mild and go away on their own, but serious reactions are also possible. Problems reported following PCV13 varied by age and dose in the series. The most common problems reported among children were:  About half became drowsy after the shot, had a temporary loss of appetite, or had redness or tenderness where the shot was given.  About 1 out of 3 had swelling where the shot was given.  About 1 out of 3 had a mild fever, and about 1 in 20 had a fever over 102.2°F. 
 Up to about 8 out of 10 became fussy or irritable. Adults have reported pain, redness, and swelling where the shot was given; also mild fever, fatigue, headache, chills, or muscle pain. [facility] children who get PCV13 along with inactivated flu vaccine at the same time may be at increased risk for seizures caused by fever. Ask your doctor for more information. Problems that could happen after any vaccine:  People sometimes faint after a medical procedure, including vaccination. Sitting or lying down for about 15 minutes can help prevent fainting, and injuries caused by a fall. Tell your doctor if you feel dizzy, or have vision changes or ringing in the ears.  Some older children and adults get severe pain in the shoulder and have difficulty moving the arm where a shot was given. This happens very rarely.  Any medication can cause a severe allergic reaction. Such reactions from a vaccine are very rare, estimated at about 1 in a million doses, and would happen within a few minutes to a few hours after the vaccination. As with any medicine, there is a very small chance of a vaccine causing a serious injury or death. The safety of vaccines is always being monitored.   For more information, visit: www.cdc.gov/vaccinesafety/  
 
 5. What if there is a serious reaction? What should I look for?  Look for anything that concerns you, such as signs of a severe allergic reaction, very high fever, or unusual behavior. Signs of a severe allergic reaction can include hives, swelling of the face and throat, difficulty breathing, a fast heartbeat, dizziness, and weakness  usually within a few minutes to a few hours after the vaccination. What should I do?  If you think it is a severe allergic reaction or other emergency that cant wait, call 9-1-1 or get the person to the nearest hospital. Otherwise, call your doctor. Reactions should be reported to the Vaccine Adverse Event Reporting System (VAERS). Your doctor should file this report, or you can do it yourself through the VAERS web site at www.vaers. [facility].gov, or by calling 1-405.253.3990. VAERS does not give medical advice. 6. The National Vaccine Injury Compensation Program 
 
The [facility] Vaccine Injury Compensation Program (VICP) is a federal program that was created to compensate people who may have been injured by certain vaccines. Persons who believe they may have been injured by a vaccine can learn about the program and about filing a claim by calling 2-340.726.2558 or visiting the [address] [place] [address] website at www.[facility].gov/vaccinecompensation. There is a time limit to file a claim for compensation. 7. How can I learn more?  Ask your healthcare provider. He or she can give you the vaccine package insert or suggest other sources of information.  Call your local or state health department.  Contact the Centers for Disease Control and Prevention (CDC): 
- Call 1-323.493.6435 (1-800-CDC-INFO) or 
- Visit CDCs website at www.cdc.gov/vaccines Vaccine Information Statement PCV13 Vaccine 11/5/2015  
[address] [id number] Department of [employer] and [employer] Centers for Disease Control and Prevention Office Use Only Medicare Wellness Visit, Female The best way to live healthy is to have a healthy lifestyle by eating a well-balanced diet, exercising regularly, limiting alcohol and stopping smoking. Regular physical exams and screening tests are another way to keep healthy. Preventive exams provided by your health care provider can find health problems before they become diseases or illnesses. Preventive services including immunizations, screening tests, monitoring and exams can help you take care of your own health. All people over age 72 should have a pneumovax  and and a prevnar shot to prevent pneumonia. These are once in a lifetime unless you and your provider decide differently. All people over 65 should have a yearly flu shot and a tetanus vaccine every 10 years. A bone mass density to screen for osteoporosis or thinning of the bones should be done every 2 years after 65. Screening for diabetes mellitus with a blood sugar test should be done every year. Glaucoma is a disease of the eye due to increased ocular pressure that can lead to blindness and it should be done every year by an eye professional. 
 
Cardiovascular screening tests that check for elevated lipids (fatty part of blood) which can lead to heart disease and strokes should be done every 5 years. Colorectal screening that evaluates for blood or polyps in your colon should be done yearly as a stool test or every five years as a flexible sigmoidoscope or every 10 years as a colonoscopy up to age 76. Breast cancer screening with a mammogram is recommended biennially  for women age 54-69. Screening for cervical cancer with a pap smear and pelvic exam is recommended for women after age 72 years every 2 years up to age 79 or when the provider and patient decide to stop. If there is a history of cervical abnormalities or other increased risk for cancer then the test is recommended yearly.  
 
Hepatitis C screening is also recommended for anyone born between [birth year] through 1965. A shingles vaccine is also recommended once in a lifetime after age 61. Your Medicare Wellness Exam is recommended annually. Here is a list of your current Health Maintenance items with a due date: 
Health Maintenance Due Topic Date Due        
 Colon cancer screening  Breast Cancer Screening Introducing [facility]! Kimberli Choe introduces [employer] patient portal. Now you can access parts of your medical record, email your doctor's office, and request medication refills online. 1. In your internet browser, go to [email or web address] 2. Click on the First Time User? Click Here link in the Sign In box. You will see the New Member Sign Up page. 3. Enter your [employer] Access Code exactly as it appears below. You will not need to use this code after youve completed the sign-up process. If you do not sign up before the expiration date, you must request a new code. · [employer] Access Code: [email or web address] Expires: 9/17/2017  2:06 PM 
 
4. Enter the last four digits of your Social Security Number (xxxx) and Date of Birth (mm/dd/yyyy) as indicated and click Submit. You will be taken to the next sign-up page. 5. Create a [employer] ID. This will be your [employer] login ID and cannot be changed, so think of one that is secure and easy to remember. 6. Create a [employer] password. You can change your password at any time. 7. Enter your Password Reset Question and Answer. This can be used at a later time if you forget your password. 8. Enter your e-mail address. You will receive e-mail notification when new information is available in [address]. 9. Click Sign Up. You can now view and download portions of your medical record. 10. Click the Download Summary menu link to download a portable copy of your medical information.  
 
If you have questions, please visit the Frequently Asked Questions section of the [employer]. Remember, [employer] is NOT to be used for urgent needs. For medical emergencies, dial 911. Now available from your iPhone and Android! Please provide this summary of care documentation to your next provider. Your primary care clinician is listed as Carrol Mendez. If you have any questions after today's visit, please call [phone].

## 2017-07-25 NOTE — PATIENT INSTRUCTIONS
Vaccine Information Statement     Pneumococcal Conjugate Vaccine (PCV13): What You Need to Know    Many Vaccine Information Statements are available in Italian and other languages. See www.immunize.org/vis. Hojas de información Sobre Vacunas están disponibles en español y en muchos otros idiomas. Visite www.immunize.org/vis. 1. Why get vaccinated? Vaccination can protect both children and adults from pneumococcal disease. Pneumococcal disease is caused by bacteria that can spread from person to person through close contact. It can cause ear infections, and it can also lead to more serious infections of the:   Lungs (pneumonia),   Blood (bacteremia), and   Covering of the brain and spinal cord (meningitis). Pneumococcal pneumonia is most common among adults. Pneumococcal meningitis can cause deafness and brain damage, and it kills about 1 child in 10 who get it. Anyone can get pneumococcal disease, but children under 3years of age and adults 72 years and older, people with certain medical conditions, and cigarette smokers are at the highest risk. Before there was a vaccine, the [facility] saw:   more than 700 cases of meningitis,   about 13,000 blood infections,   about 5 million ear infections, and   about 200 deaths  in children under 5 each year from pneumococcal disease. Since vaccine became available, severe pneumococcal disease in these children has fallen by 88%. About 18,000 older adults die of pneumococcal disease each year in the [place]. Treatment of pneumococcal infections with penicillin and other drugs is not as effective as it used to be, because some strains of the disease have become resistant to these drugs. This makes prevention of the disease, through vaccination, even more important. 2. PCV13 vaccine    Pneumococcal conjugate vaccine (called PCV13) protects against 13 types of pneumococcal bacteria.       PCV13 is routinely given to children at 2, 4, 6, and 1515 months of age. It is also recommended for children and adults 3to 59years of age with certain health conditions, and for all adults 72years of age and older. Your doctor can give you details. 3. Some people should not get this vaccine    Anyone who has ever had a life-threatening allergic reaction to a dose of this vaccine, to an earlier pneumococcal vaccine called PCV7, or to any vaccine containing diphtheria toxoid (for example, DTaP), should not get PCV13. Anyone with a severe allergy to any component of PCV13 should not get the vaccine. Tell your doctor if the person being vaccinated has any severe allergies. If the person scheduled for vaccination is not feeling well, your healthcare provider might decide to reschedule the shot on another day. 4. Risks of a vaccine reaction    With any medicine, including vaccines, there is a chance of reactions. These are usually mild and go away on their own, but serious reactions are also possible. Problems reported following PCV13 varied by age and dose in the series. The most common problems reported among children were:    About half became drowsy after the shot, had a temporary loss of appetite, or had redness or tenderness where the shot was given.  About 1 out of 3 had swelling where the shot was given.  About 1 out of 3 had a mild fever, and about 1 in 20 had a fever over 102.2°F.   Up to about 8 out of 10 became fussy or irritable. Adults have reported pain, redness, and swelling where the shot was given; also mild fever, fatigue, headache, chills, or muscle pain. Alyjudi Tim children who get PCV13 along with inactivated flu vaccine at the same time may be at increased risk for seizures caused by fever. Ask your doctor for more information. Problems that could happen after any vaccine:     People sometimes faint after a medical procedure, including vaccination.  Sitting or lying down for about 15 minutes can help prevent fainting, and injuries caused by a fall. Tell your doctor if you feel dizzy, or have vision changes or ringing in the ears.  Some older children and adults get severe pain in the shoulder and have difficulty moving the arm where a shot was given. This happens very rarely.  Any medication can cause a severe allergic reaction. Such reactions from a vaccine are very rare, estimated at about 1 in a million doses, and would happen within a few minutes to a few hours after the vaccination. As with any medicine, there is a very small chance of a vaccine causing a serious injury or death. The safety of vaccines is always being monitored. For more information, visit: www.cdc.gov/vaccinesafety/     5. What if there is a serious reaction? What should I look for?  Look for anything that concerns you, such as signs of a severe allergic reaction, very high fever, or unusual behavior. Signs of a severe allergic reaction can include hives, swelling of the face and throat, difficulty breathing, a fast heartbeat, dizziness, and weakness  usually within a few minutes to a few hours after the vaccination. What should I do?  If you think it is a severe allergic reaction or other emergency that cant wait, call 9-1-1 or get the person to the nearest hospital. Otherwise, call your doctor. Reactions should be reported to the Vaccine Adverse Event Reporting System (VAERS). Your doctor should file this report, or you can do it yourself through the VAERS web site at www.vaers. hhs.gov, or by calling 8-497.528.8000. VAERS does not give medical advice. 6. The National Vaccine Injury Compensation Program    The [facility] Vaccine Injury Compensation Program (VICP) is a federal program that was created to compensate people who may have been injured by certain vaccines.     Persons who believe they may have been injured by a vaccine can learn about the program and about filing a claim by calling 1-392.302.6991 or visiting the [employer] website at www.[facility].gov/vaccinecompensation. There is a time limit to file a claim for compensation. 7. How can I learn more?  Ask your healthcare provider. He or she can give you the vaccine package insert or suggest other sources of information.  Call your local or state health department.  Contact the Centers for Disease Control and Prevention (CDC):  - Call 5-133.345.8550 (3-736-VDV-INFO) or  - Visit CDCs website at www.cdc.gov/vaccines    Vaccine Information Statement   PCV13 Vaccine   11/5/2015   [address] [id number]    Department of Health and Human Services  Centers for Disease Control and Prevention    Office Use Only  Medicare Wellness Visit, Female    The best way to live healthy is to have a healthy lifestyle by eating a well-balanced diet, exercising regularly, limiting alcohol and stopping smoking. Regular physical exams and screening tests are another way to keep healthy. Preventive exams provided by your health care provider can find health problems before they become diseases or illnesses. Preventive services including immunizations, screening tests, monitoring and exams can help you take care of your own health. All people over age 72 should have a pneumovax  and and a prevnar shot to prevent pneumonia. These are once in a lifetime unless you and your provider decide differently. All people over 65 should have a yearly flu shot and a tetanus vaccine every 10 years. A bone mass density to screen for osteoporosis or thinning of the bones should be done every 2 years after 65. Screening for diabetes mellitus with a blood sugar test should be done every year.     Glaucoma is a disease of the eye due to increased ocular pressure that can lead to blindness and it should be done every year by an eye professional.    Cardiovascular screening tests that check for elevated lipids (fatty part of blood) which can lead to heart disease and strokes should be done every 5 years. Colorectal screening that evaluates for blood or polyps in your colon should be done yearly as a stool test or every five years as a flexible sigmoidoscope or every 10 years as a colonoscopy up to age 76. Breast cancer screening with a mammogram is recommended biennially  for women age 54-69. Screening for cervical cancer with a pap smear and pelvic exam is recommended for women after age 72 years every 2 years up to age 79 or when the provider and patient decide to stop. If there is a history of cervical abnormalities or other increased risk for cancer then the test is recommended yearly. Hepatitis C screening is also recommended for anyone born between [birth year] through [address]. A shingles vaccine is also recommended once in a lifetime after age 61. Your Medicare Wellness Exam is recommended annually.     Here is a list of your current Health Maintenance items with a due date:  Health Maintenance Due   Topic Date Due                        Colon cancer screening     Breast Cancer Screening

## 2017-07-26 LAB
ALBUMIN SERPL-MCNC: 4.4 G/DL (ref 3.6–4.8)
ALBUMIN/GLOB SERPL: 1.6 {RATIO} (ref 1.2–2.2)
ALP SERPL-CCNC: 72 IU/L (ref 39–117)
ALT SERPL-CCNC: 12 IU/L (ref 0–32)
AST SERPL-CCNC: 22 IU/L (ref 0–40)
BASOPHILS # BLD AUTO: 0 X10E3/UL (ref 0–0.2)
BASOPHILS NFR BLD AUTO: 0 %
BILIRUB SERPL-MCNC: 0.6 MG/DL (ref 0–1.2)
BUN SERPL-MCNC: 18 MG/DL (ref 8–27)
BUN/CREAT SERPL: 23 (ref 12–28)
CALCIUM SERPL-MCNC: 9.3 MG/DL (ref 8.7–10.3)
CHLORIDE SERPL-SCNC: 101 MMOL/L (ref 96–106)
CHOLEST SERPL-MCNC: 197 MG/DL (ref 100–199)
CO2 SERPL-SCNC: 23 MMOL/L (ref 18–29)
CREAT SERPL-MCNC: 0.79 MG/DL (ref 0.57–1)
EOSINOPHIL # BLD AUTO: 0.1 X10E3/UL (ref 0–0.4)
EOSINOPHIL NFR BLD AUTO: 1 %
ERYTHROCYTE [DISTWIDTH] IN BLOOD BY AUTOMATED COUNT: 13.4 % (ref 12.3–15.4)
GLOBULIN SER CALC-MCNC: 2.7 G/DL (ref 1.5–4.5)
GLUCOSE SERPL-MCNC: 104 MG/DL (ref 65–99)
HCT VFR BLD AUTO: 37.2 % (ref 34–46.6)
HDLC SERPL-MCNC: 123 MG/DL
HGB BLD-MCNC: 12.6 G/DL (ref 11.1–15.9)
IMM GRANULOCYTES # BLD: 0 X10E3/UL (ref 0–0.1)
IMM GRANULOCYTES NFR BLD: 0 %
INTERPRETATION, 910389: NORMAL
LDLC SERPL CALC-MCNC: 51 MG/DL (ref 0–99)
LYMPHOCYTES # BLD AUTO: 2.4 X10E3/UL (ref 0.7–3.1)
LYMPHOCYTES NFR BLD AUTO: 35 %
MCH RBC QN AUTO: 33.4 PG (ref 26.6–33)
MCHC RBC AUTO-ENTMCNC: 33.9 G/DL (ref 31.5–35.7)
MCV RBC AUTO: 99 FL (ref 79–97)
MONOCYTES # BLD AUTO: 0.6 X10E3/UL (ref 0.1–0.9)
MONOCYTES NFR BLD AUTO: 9 %
NEUTROPHILS # BLD AUTO: 3.8 X10E3/UL (ref 1.4–7)
NEUTROPHILS NFR BLD AUTO: 55 %
PLATELET # BLD AUTO: 281 X10E3/UL (ref 150–379)
POTASSIUM SERPL-SCNC: 3.9 MMOL/L (ref 3.5–5.2)
PROT SERPL-MCNC: 7.1 G/DL (ref 6–8.5)
RBC # BLD AUTO: 3.77 X10E6/UL (ref 3.77–5.28)
SODIUM SERPL-SCNC: 143 MMOL/L (ref 134–144)
TRIGL SERPL-MCNC: 115 MG/DL (ref 0–149)
TSH SERPL DL<=0.005 MIU/L-ACNC: 1.66 UIU/ML (ref 0.45–4.5)
VLDLC SERPL CALC-MCNC: 23 MG/DL (ref 5–40)
WBC # BLD AUTO: 7 X10E3/UL (ref 3.4–10.8)

## 2017-07-26 NOTE — TELEPHONE ENCOUNTER
Request for entresto 24/26mg twice a day. Last office visit 6/19/17, next office visit 12/18/17. Refills per verbal order from Dr. Tawanna Thompson.

## 2017-08-22 ENCOUNTER — HOSPITAL ENCOUNTER (OUTPATIENT)
Dept: MAMMOGRAPHY | Age: 67
Discharge: HOME | End: 2017-08-22
Attending: INTERNAL MEDICINE
Payer: MEDICARE

## 2017-08-22 DIAGNOSIS — Z12.31 VISIT FOR SCREENING MAMMOGRAM: ICD-10-CM

## 2017-08-22 PROCEDURE — 77063 BREAST TOMOSYNTHESIS BI: CPT

## 2017-09-20 ENCOUNTER — OFFICE VISIT (OUTPATIENT)
Dept: CARDIOLOGY CLINIC | Age: 67
End: 2017-09-20

## 2017-09-20 DIAGNOSIS — Z95.810 PRESENCE OF AUTOMATIC CARDIOVERTER/DEFIBRILLATOR (AICD): Primary | ICD-10-CM

## 2017-10-16 NOTE — TELEPHONE ENCOUNTER
Verified patient with two types of identifiers. Spoke with patient and notified her to call her pharmacy because she should still have refills available for this medication. Patient verbalized understanding and will call with any other questions.

## 2017-10-16 NOTE — TELEPHONE ENCOUNTER
Requested Prescriptions     Pending Prescriptions Disp Refills    spironolactone (ALDACTONE) 25 mg tablet 30 Tab 6     Sig: Take 1 Tab by mouth daily.  Indications: Chronic Heart Failure     Last OV 6/19/17  Next OV 12/18/17    Pharmacy verified    Thank you, LEXIE

## 2017-11-08 NOTE — TELEPHONE ENCOUNTER
Request for Coreg 6.25 mg take 1 tab BID. Last office visit 6/19/17, next office visit 12/18/17. Refills per verbal order from Dr. Moi Dsouza.

## 2017-11-08 NOTE — TELEPHONE ENCOUNTER
Requested Prescriptions     Pending Prescriptions Disp Refills    carvedilol (COREG) 6.25 mg tablet 60 Tab 6     Sig: Take 1 Tab by mouth two (2) times daily (with meals).      Last OV 6/19/17   Next OV 12/18/17    Pharmacy verified    Thank you, LEXIE

## 2018-01-01 ENCOUNTER — PATIENT OUTREACH (OUTPATIENT)
Dept: FAMILY MEDICINE CLINIC | Age: 68
End: 2018-01-01

## 2018-01-01 ENCOUNTER — OFFICE VISIT (OUTPATIENT)
Dept: FAMILY MEDICINE CLINIC | Age: 68
End: 2018-01-01

## 2018-01-01 ENCOUNTER — HOSPITAL ENCOUNTER (OUTPATIENT)
Dept: LAB | Age: 68
Discharge: HOME | End: 2018-03-13
Payer: MEDICARE

## 2018-01-01 ENCOUNTER — HOSPITAL ENCOUNTER (EMERGENCY)
Age: 68
Discharge: HOME | End: 2018-02-24
Attending: EMERGENCY MEDICINE
Payer: MEDICARE

## 2018-01-01 ENCOUNTER — TELEPHONE (OUTPATIENT)
Dept: FAMILY MEDICINE CLINIC | Age: 68
End: 2018-01-01

## 2018-01-01 ENCOUNTER — HOSPITAL ENCOUNTER (INPATIENT)
Age: 68
LOS: 4 days | Discharge: HOME | DRG: 435 | End: 2018-06-22
Attending: EMERGENCY MEDICINE | Admitting: INTERNAL MEDICINE
Payer: MEDICARE

## 2018-01-01 ENCOUNTER — CLINICAL SUPPORT (OUTPATIENT)
Dept: CARDIOLOGY CLINIC | Age: 68
End: 2018-01-01

## 2018-01-01 ENCOUNTER — OFFICE VISIT (OUTPATIENT)
Dept: CARDIOLOGY CLINIC | Age: 68
End: 2018-01-01

## 2018-01-01 ENCOUNTER — APPOINTMENT (OUTPATIENT)
Dept: CT IMAGING | Age: 68
DRG: 435 | End: 2018-01-01
Attending: INTERNAL MEDICINE
Payer: MEDICARE

## 2018-01-01 ENCOUNTER — HOSPITAL ENCOUNTER (OUTPATIENT)
Dept: LAB | Age: 68
Discharge: HOME | End: 2018-03-21
Payer: MEDICARE

## 2018-01-01 ENCOUNTER — APPOINTMENT (OUTPATIENT)
Dept: CT IMAGING | Age: 68
DRG: 435 | End: 2018-01-01
Attending: PHYSICIAN ASSISTANT
Payer: MEDICARE

## 2018-01-01 ENCOUNTER — TELEPHONE (OUTPATIENT)
Dept: CARDIOLOGY CLINIC | Age: 68
End: 2018-01-01

## 2018-01-01 ENCOUNTER — ANESTHESIA EVENT (OUTPATIENT)
Dept: ENDOSCOPY | Age: 68
DRG: 435 | End: 2018-01-01
Payer: MEDICARE

## 2018-01-01 ENCOUNTER — HOSPITAL ENCOUNTER (OUTPATIENT)
Dept: BONE DENSITY | Age: 68
Discharge: HOME | End: 2018-03-27
Attending: INTERNAL MEDICINE
Payer: MEDICARE

## 2018-01-01 ENCOUNTER — ANESTHESIA (OUTPATIENT)
Dept: ENDOSCOPY | Age: 68
DRG: 435 | End: 2018-01-01
Payer: MEDICARE

## 2018-01-01 ENCOUNTER — PATIENT OUTREACH (OUTPATIENT)
Dept: CASE MANAGEMENT | Age: 68
End: 2018-01-01

## 2018-01-01 VITALS
RESPIRATION RATE: 18 BRPM | BODY MASS INDEX: 29.45 KG/M2 | OXYGEN SATURATION: 98 % | HEART RATE: 60 BPM | HEIGHT: 61 IN | WEIGHT: 156 LBS | DIASTOLIC BLOOD PRESSURE: 61 MMHG | TEMPERATURE: 98.2 F | SYSTOLIC BLOOD PRESSURE: 95 MMHG

## 2018-01-01 VITALS
TEMPERATURE: 98 F | RESPIRATION RATE: 16 BRPM | WEIGHT: 150 LBS | DIASTOLIC BLOOD PRESSURE: 51 MMHG | BODY MASS INDEX: 28.32 KG/M2 | OXYGEN SATURATION: 94 % | HEART RATE: 70 BPM | SYSTOLIC BLOOD PRESSURE: 85 MMHG | HEIGHT: 61 IN

## 2018-01-01 VITALS
SYSTOLIC BLOOD PRESSURE: 102 MMHG | DIASTOLIC BLOOD PRESSURE: 70 MMHG | HEIGHT: 61 IN | WEIGHT: 154 LBS | HEART RATE: 60 BPM | BODY MASS INDEX: 29.07 KG/M2

## 2018-01-01 VITALS
OXYGEN SATURATION: 96 % | SYSTOLIC BLOOD PRESSURE: 111 MMHG | WEIGHT: 163 LBS | TEMPERATURE: 97.9 F | RESPIRATION RATE: 16 BRPM | HEIGHT: 61 IN | DIASTOLIC BLOOD PRESSURE: 72 MMHG | HEART RATE: 60 BPM | BODY MASS INDEX: 30.78 KG/M2

## 2018-01-01 VITALS
BODY MASS INDEX: 29.23 KG/M2 | HEIGHT: 61 IN | HEART RATE: 60 BPM | TEMPERATURE: 98.3 F | RESPIRATION RATE: 18 BRPM | DIASTOLIC BLOOD PRESSURE: 57 MMHG | WEIGHT: 154.8 LBS | OXYGEN SATURATION: 97 % | SYSTOLIC BLOOD PRESSURE: 107 MMHG

## 2018-01-01 VITALS
HEART RATE: 63 BPM | RESPIRATION RATE: 24 BRPM | OXYGEN SATURATION: 95 % | DIASTOLIC BLOOD PRESSURE: 56 MMHG | SYSTOLIC BLOOD PRESSURE: 92 MMHG | HEIGHT: 61 IN | BODY MASS INDEX: 29.45 KG/M2 | TEMPERATURE: 98.5 F | WEIGHT: 156 LBS

## 2018-01-01 VITALS
SYSTOLIC BLOOD PRESSURE: 90 MMHG | RESPIRATION RATE: 18 BRPM | TEMPERATURE: 98.3 F | HEART RATE: 60 BPM | WEIGHT: 156 LBS | DIASTOLIC BLOOD PRESSURE: 50 MMHG | BODY MASS INDEX: 29.45 KG/M2 | OXYGEN SATURATION: 95 % | HEIGHT: 61 IN

## 2018-01-01 VITALS
SYSTOLIC BLOOD PRESSURE: 126 MMHG | TEMPERATURE: 97.7 F | WEIGHT: 159.17 LBS | HEIGHT: 61 IN | OXYGEN SATURATION: 97 % | RESPIRATION RATE: 16 BRPM | DIASTOLIC BLOOD PRESSURE: 82 MMHG | HEART RATE: 75 BPM | BODY MASS INDEX: 30.05 KG/M2

## 2018-01-01 DIAGNOSIS — Z95.810 PRESENCE OF AUTOMATIC CARDIOVERTER/DEFIBRILLATOR (AICD): Primary | ICD-10-CM

## 2018-01-01 DIAGNOSIS — K86.89 PANCREATIC MASS: Primary | ICD-10-CM

## 2018-01-01 DIAGNOSIS — R14.0 ABDOMINAL DISTENSION (GASEOUS): Primary | ICD-10-CM

## 2018-01-01 DIAGNOSIS — I50.22 CHRONIC SYSTOLIC CONGESTIVE HEART FAILURE, NYHA CLASS 3 (HCC): Primary | ICD-10-CM

## 2018-01-01 DIAGNOSIS — F32.A DEPRESSION, UNSPECIFIED DEPRESSION TYPE: ICD-10-CM

## 2018-01-01 DIAGNOSIS — R04.0 EPISTAXIS: Primary | ICD-10-CM

## 2018-01-01 DIAGNOSIS — Z95.810 PRESENCE OF BIVENTRICULAR AICD: Primary | ICD-10-CM

## 2018-01-01 DIAGNOSIS — I42.9 IDIOPATHIC CARDIOMYOPATHY (HCC): ICD-10-CM

## 2018-01-01 DIAGNOSIS — I48.0 PAF (PAROXYSMAL ATRIAL FIBRILLATION) (HCC): ICD-10-CM

## 2018-01-01 DIAGNOSIS — Z79.01 CHRONIC ANTICOAGULATION: ICD-10-CM

## 2018-01-01 DIAGNOSIS — C25.0 MALIGNANT NEOPLASM OF HEAD OF PANCREAS (HCC): ICD-10-CM

## 2018-01-01 DIAGNOSIS — R10.10 UPPER ABDOMINAL PAIN: ICD-10-CM

## 2018-01-01 DIAGNOSIS — K86.89 PANCREATIC MASS: ICD-10-CM

## 2018-01-01 DIAGNOSIS — I50.22 CHRONIC SYSTOLIC CONGESTIVE HEART FAILURE, NYHA CLASS 3 (HCC): ICD-10-CM

## 2018-01-01 DIAGNOSIS — M81.0 POSTMENOPAUSAL BONE LOSS: ICD-10-CM

## 2018-01-01 DIAGNOSIS — I42.9 CARDIOMYOPATHY, UNSPECIFIED TYPE (HCC): Primary | ICD-10-CM

## 2018-01-01 DIAGNOSIS — I50.22 CHF NYHA CLASS II (SYMPTOMS WITH MODERATELY STRENUOUS ACTIVITIES), CHRONIC, SYSTOLIC (HCC): ICD-10-CM

## 2018-01-01 DIAGNOSIS — Z09 HOSPITAL DISCHARGE FOLLOW-UP: Primary | ICD-10-CM

## 2018-01-01 DIAGNOSIS — K85.80 OTHER ACUTE PANCREATITIS, UNSPECIFIED COMPLICATION STATUS: Primary | ICD-10-CM

## 2018-01-01 DIAGNOSIS — Z12.11 COLON CANCER SCREENING: ICD-10-CM

## 2018-01-01 DIAGNOSIS — Z79.899 ON AMIODARONE THERAPY: ICD-10-CM

## 2018-01-01 DIAGNOSIS — M81.0 OSTEOPOROSIS WITHOUT CURRENT PATHOLOGICAL FRACTURE, UNSPECIFIED OSTEOPOROSIS TYPE: Primary | ICD-10-CM

## 2018-01-01 DIAGNOSIS — I95.9 HYPOTENSION, UNSPECIFIED HYPOTENSION TYPE: Primary | ICD-10-CM

## 2018-01-01 DIAGNOSIS — Z51.5 HOSPICE CARE: ICD-10-CM

## 2018-01-01 DIAGNOSIS — I10 ESSENTIAL HYPERTENSION: ICD-10-CM

## 2018-01-01 DIAGNOSIS — I47.29 PAROXYSMAL VENTRICULAR TACHYCARDIA: ICD-10-CM

## 2018-01-01 LAB
ALBUMIN SERPL-MCNC: 3.1 G/DL (ref 3.5–5)
ALBUMIN SERPL-MCNC: 3.7 G/DL (ref 3.5–5)
ALBUMIN SERPL-MCNC: 4.1 G/DL (ref 3.6–4.8)
ALBUMIN/GLOB SERPL: 1 {RATIO} (ref 1.1–2.2)
ALBUMIN/GLOB SERPL: 1 {RATIO} (ref 1.1–2.2)
ALBUMIN/GLOB SERPL: 1.4 {RATIO} (ref 1.2–2.2)
ALP SERPL-CCNC: 61 U/L (ref 45–117)
ALP SERPL-CCNC: 73 IU/L (ref 39–117)
ALP SERPL-CCNC: 73 U/L (ref 45–117)
ALT SERPL-CCNC: 13 IU/L (ref 0–32)
ALT SERPL-CCNC: 15 U/L (ref 12–78)
ALT SERPL-CCNC: 21 U/L (ref 12–78)
ANION GAP SERPL CALC-SCNC: 10 MMOL/L (ref 5–15)
ANION GAP SERPL CALC-SCNC: 8 MMOL/L (ref 5–15)
ANION GAP SERPL CALC-SCNC: 9 MMOL/L (ref 5–15)
ANION GAP SERPL CALC-SCNC: 9 MMOL/L (ref 5–15)
APPEARANCE UR: CLEAR
AST SERPL-CCNC: 16 IU/L (ref 0–40)
AST SERPL-CCNC: 16 U/L (ref 15–37)
AST SERPL-CCNC: 16 U/L (ref 15–37)
ATRIAL RATE: 38 BPM
BACTERIA SPEC CULT: NORMAL
BACTERIA URNS QL MICRO: ABNORMAL /HPF
BASOPHILS # BLD AUTO: 0 X10E3/UL (ref 0–0.2)
BASOPHILS # BLD: 0 K/UL (ref 0–0.1)
BASOPHILS # BLD: 0.1 K/UL (ref 0–0.1)
BASOPHILS NFR BLD AUTO: 1 %
BASOPHILS NFR BLD: 1 % (ref 0–1)
BILIRUB SERPL-MCNC: 0.4 MG/DL (ref 0–1.2)
BILIRUB SERPL-MCNC: 0.6 MG/DL (ref 0.2–1)
BILIRUB SERPL-MCNC: 0.6 MG/DL (ref 0.2–1)
BILIRUB UR QL: NEGATIVE
BNP SERPL-MCNC: 110 PG/ML (ref 0–125)
BUN SERPL-MCNC: 10 MG/DL (ref 6–20)
BUN SERPL-MCNC: 14 MG/DL (ref 6–20)
BUN SERPL-MCNC: 14 MG/DL (ref 6–20)
BUN SERPL-MCNC: 22 MG/DL (ref 8–27)
BUN SERPL-MCNC: 8 MG/DL (ref 6–20)
BUN/CREAT SERPL: 13 (ref 12–20)
BUN/CREAT SERPL: 15 (ref 12–20)
BUN/CREAT SERPL: 16 (ref 12–20)
BUN/CREAT SERPL: 19 (ref 12–20)
BUN/CREAT SERPL: 19 (ref 12–28)
CALCIUM SERPL-MCNC: 7.7 MG/DL (ref 8.5–10.1)
CALCIUM SERPL-MCNC: 8 MG/DL (ref 8.5–10.1)
CALCIUM SERPL-MCNC: 8.4 MG/DL (ref 8.5–10.1)
CALCIUM SERPL-MCNC: 9.2 MG/DL (ref 8.7–10.3)
CALCIUM SERPL-MCNC: 9.5 MG/DL (ref 8.5–10.1)
CALCULATED P AXIS, ECG09: -17 DEGREES
CALCULATED R AXIS, ECG10: -133 DEGREES
CALCULATED T AXIS, ECG11: 55 DEGREES
CANCER AG19-9 SERPL-ACNC: 2406 U/ML (ref 0–35)
CC UR VC: NORMAL
CHLORIDE SERPL-SCNC: 101 MMOL/L (ref 96–106)
CHLORIDE SERPL-SCNC: 106 MMOL/L (ref 97–108)
CHLORIDE SERPL-SCNC: 110 MMOL/L (ref 97–108)
CHLORIDE SERPL-SCNC: 113 MMOL/L (ref 97–108)
CHLORIDE SERPL-SCNC: 114 MMOL/L (ref 97–108)
CHOLEST SERPL-MCNC: 145 MG/DL
CHOLEST SERPL-MCNC: 212 MG/DL (ref 100–199)
CO2 SERPL-SCNC: 20 MMOL/L (ref 21–32)
CO2 SERPL-SCNC: 22 MMOL/L (ref 21–32)
CO2 SERPL-SCNC: 24 MMOL/L (ref 18–29)
CO2 SERPL-SCNC: 25 MMOL/L (ref 21–32)
CO2 SERPL-SCNC: 26 MMOL/L (ref 21–32)
COLOR UR: ABNORMAL
CREAT SERPL-MCNC: 0.61 MG/DL (ref 0.55–1.02)
CREAT SERPL-MCNC: 0.65 MG/DL (ref 0.55–1.02)
CREAT SERPL-MCNC: 0.74 MG/DL (ref 0.55–1.02)
CREAT SERPL-MCNC: 0.88 MG/DL (ref 0.55–1.02)
CREAT SERPL-MCNC: 1.17 MG/DL (ref 0.57–1)
DIAGNOSIS, 93000: NORMAL
DIFFERENTIAL METHOD BLD: ABNORMAL
ELASTASE PANC STL-MCNT: 224 UG ELAST./G
EOSINOPHIL # BLD AUTO: 0.1 X10E3/UL (ref 0–0.4)
EOSINOPHIL # BLD: 0 K/UL (ref 0–0.4)
EOSINOPHIL # BLD: 0.1 K/UL (ref 0–0.4)
EOSINOPHIL NFR BLD AUTO: 1 %
EOSINOPHIL NFR BLD: 1 % (ref 0–7)
EPITH CASTS URNS QL MICRO: ABNORMAL /LPF
ERYTHROCYTE [DISTWIDTH] IN BLOOD BY AUTOMATED COUNT: 12.2 % (ref 11.5–14.5)
ERYTHROCYTE [DISTWIDTH] IN BLOOD BY AUTOMATED COUNT: 12.3 % (ref 11.5–14.5)
ERYTHROCYTE [DISTWIDTH] IN BLOOD BY AUTOMATED COUNT: 12.4 % (ref 11.5–14.5)
ERYTHROCYTE [DISTWIDTH] IN BLOOD BY AUTOMATED COUNT: 13 % (ref 12.3–15.4)
EST. AVERAGE GLUCOSE BLD GHB EST-MCNC: 117 MG/DL
FERRITIN SERPL-MCNC: 74 NG/ML (ref 8–252)
GFR SERPLBLD CREATININE-BSD FMLA CKD-EPI: 49 ML/MIN/1.73
GFR SERPLBLD CREATININE-BSD FMLA CKD-EPI: 56 ML/MIN/1.73
GLOBULIN SER CALC-MCNC: 2.9 G/DL (ref 1.5–4.5)
GLOBULIN SER CALC-MCNC: 3.2 G/DL (ref 2–4)
GLOBULIN SER CALC-MCNC: 3.8 G/DL (ref 2–4)
GLUCOSE SERPL-MCNC: 104 MG/DL (ref 65–99)
GLUCOSE SERPL-MCNC: 110 MG/DL (ref 65–100)
GLUCOSE SERPL-MCNC: 110 MG/DL (ref 65–100)
GLUCOSE SERPL-MCNC: 112 MG/DL (ref 65–100)
GLUCOSE SERPL-MCNC: 119 MG/DL (ref 65–100)
GLUCOSE UR STRIP.AUTO-MCNC: NEGATIVE MG/DL
HBA1C MFR BLD: 5.7 % (ref 4.2–6.3)
HCT VFR BLD AUTO: 31.9 % (ref 35–47)
HCT VFR BLD AUTO: 33.6 % (ref 35–47)
HCT VFR BLD AUTO: 34 % (ref 35–47)
HCT VFR BLD AUTO: 34.4 % (ref 35–47)
HCT VFR BLD AUTO: 35.3 % (ref 35–47)
HCT VFR BLD AUTO: 36.2 % (ref 34–46.6)
HDLC SERPL-MCNC: 120 MG/DL
HDLC SERPL-MCNC: 84 MG/DL
HDLC SERPL: 1.7 {RATIO} (ref 0–5)
HEMOCCULT STL QL IA: NEGATIVE
HEMOCCULT STL QL: NEGATIVE
HGB BLD-MCNC: 10.5 G/DL (ref 11.5–16)
HGB BLD-MCNC: 11 G/DL (ref 11.5–16)
HGB BLD-MCNC: 11.1 G/DL (ref 11.5–16)
HGB BLD-MCNC: 11.5 G/DL (ref 11.1–15.9)
HGB BLD-MCNC: 11.7 G/DL (ref 11.5–16)
HGB BLD-MCNC: 11.7 G/DL (ref 11.5–16)
HGB UR QL STRIP: NEGATIVE
HYALINE CASTS URNS QL MICRO: ABNORMAL /LPF (ref 0–5)
IMM GRANULOCYTES # BLD: 0 K/UL (ref 0–0.04)
IMM GRANULOCYTES # BLD: 0 X10E3/UL (ref 0–0.1)
IMM GRANULOCYTES NFR BLD AUTO: 0 % (ref 0–0.5)
IMM GRANULOCYTES NFR BLD: 0 %
INR PPP: 1 (ref 0.9–1.1)
INR PPP: 1.1 (ref 0.9–1.1)
INTERPRETATION, 910389: NORMAL
INTERPRETATION: NORMAL
IRON SATN MFR SERPL: 19 % (ref 20–50)
IRON SERPL-MCNC: 65 UG/DL (ref 35–150)
KETONES UR QL STRIP.AUTO: NEGATIVE MG/DL
LACTOFERRIN, LCTFLT: 37.6 UG/ML(G) (ref 0–7.24)
LDLC SERPL CALC-MCNC: 42.4 MG/DL (ref 0–100)
LDLC SERPL CALC-MCNC: 73 MG/DL (ref 0–99)
LEUKOCYTE ESTERASE UR QL STRIP.AUTO: NEGATIVE
LIPASE SERPL-CCNC: 1443 U/L (ref 73–393)
LIPASE SERPL-CCNC: 420 U/L (ref 73–393)
LIPID PROFILE,FLP: NORMAL
LYMPHOCYTES # BLD AUTO: 1.8 X10E3/UL (ref 0.7–3.1)
LYMPHOCYTES # BLD: 1.6 K/UL (ref 0.8–3.5)
LYMPHOCYTES # BLD: 1.7 K/UL (ref 0.8–3.5)
LYMPHOCYTES # BLD: 1.8 K/UL (ref 0.8–3.5)
LYMPHOCYTES # BLD: 2.2 K/UL (ref 0.8–3.5)
LYMPHOCYTES # BLD: 2.4 K/UL (ref 0.8–3.5)
LYMPHOCYTES NFR BLD AUTO: 31 %
LYMPHOCYTES NFR BLD: 25 % (ref 12–49)
LYMPHOCYTES NFR BLD: 25 % (ref 12–49)
LYMPHOCYTES NFR BLD: 31 % (ref 12–49)
LYMPHOCYTES NFR BLD: 33 % (ref 12–49)
LYMPHOCYTES NFR BLD: 36 % (ref 12–49)
MCH RBC QN AUTO: 31.9 PG (ref 26.6–33)
MCH RBC QN AUTO: 33.1 PG (ref 26–34)
MCH RBC QN AUTO: 33.5 PG (ref 26–34)
MCH RBC QN AUTO: 33.6 PG (ref 26–34)
MCHC RBC AUTO-ENTMCNC: 31.8 G/DL (ref 31.5–35.7)
MCHC RBC AUTO-ENTMCNC: 32.6 G/DL (ref 30–36.5)
MCHC RBC AUTO-ENTMCNC: 32.7 G/DL (ref 30–36.5)
MCHC RBC AUTO-ENTMCNC: 32.9 G/DL (ref 30–36.5)
MCHC RBC AUTO-ENTMCNC: 33.1 G/DL (ref 30–36.5)
MCHC RBC AUTO-ENTMCNC: 34 G/DL (ref 30–36.5)
MCV RBC AUTO: 100 FL (ref 80–99)
MCV RBC AUTO: 101 FL (ref 79–97)
MCV RBC AUTO: 101.2 FL (ref 80–99)
MCV RBC AUTO: 101.5 FL (ref 80–99)
MCV RBC AUTO: 101.9 FL (ref 80–99)
MCV RBC AUTO: 98.9 FL (ref 80–99)
MONOCYTES # BLD AUTO: 0.4 X10E3/UL (ref 0.1–0.9)
MONOCYTES # BLD: 0.6 K/UL (ref 0–1)
MONOCYTES # BLD: 0.7 K/UL (ref 0–1)
MONOCYTES NFR BLD AUTO: 8 %
MONOCYTES NFR BLD: 10 % (ref 5–13)
MONOCYTES NFR BLD: 9 % (ref 5–13)
NEUTROPHILS # BLD AUTO: 3.5 X10E3/UL (ref 1.4–7)
NEUTROPHILS NFR BLD AUTO: 59 %
NEUTS SEG # BLD: 3.4 K/UL (ref 1.8–8)
NEUTS SEG # BLD: 3.5 K/UL (ref 1.8–8)
NEUTS SEG # BLD: 3.6 K/UL (ref 1.8–8)
NEUTS SEG # BLD: 4.1 K/UL (ref 1.8–8)
NEUTS SEG # BLD: 4.1 K/UL (ref 1.8–8)
NEUTS SEG NFR BLD: 53 % (ref 32–75)
NEUTS SEG NFR BLD: 55 % (ref 32–75)
NEUTS SEG NFR BLD: 58 % (ref 32–75)
NEUTS SEG NFR BLD: 62 % (ref 32–75)
NEUTS SEG NFR BLD: 65 % (ref 32–75)
NITRITE UR QL STRIP.AUTO: NEGATIVE
NRBC # BLD: 0 K/UL (ref 0–0.01)
NRBC BLD-RTO: 0 PER 100 WBC
P-R INTERVAL, ECG05: 226 MS
PDF IMAGE, 910387: NORMAL
PH UR STRIP: 7 [PH] (ref 5–8)
PLATELET # BLD AUTO: 232 K/UL (ref 150–400)
PLATELET # BLD AUTO: 249 K/UL (ref 150–400)
PLATELET # BLD AUTO: 249 K/UL (ref 150–400)
PLATELET # BLD AUTO: 282 K/UL (ref 150–400)
PLATELET # BLD AUTO: 286 K/UL (ref 150–400)
PLATELET # BLD AUTO: 318 X10E3/UL (ref 150–379)
PLEASE NOTE:, 188601: NORMAL
PMV BLD AUTO: 9.1 FL (ref 8.9–12.9)
PMV BLD AUTO: 9.2 FL (ref 8.9–12.9)
PMV BLD AUTO: 9.2 FL (ref 8.9–12.9)
PMV BLD AUTO: 9.4 FL (ref 8.9–12.9)
PMV BLD AUTO: 9.4 FL (ref 8.9–12.9)
POTASSIUM SERPL-SCNC: 3.6 MMOL/L (ref 3.5–5.1)
POTASSIUM SERPL-SCNC: 3.8 MMOL/L (ref 3.5–5.1)
POTASSIUM SERPL-SCNC: 3.8 MMOL/L (ref 3.5–5.1)
POTASSIUM SERPL-SCNC: 3.9 MMOL/L (ref 3.5–5.1)
POTASSIUM SERPL-SCNC: 4.1 MMOL/L (ref 3.5–5.2)
PROT SERPL-MCNC: 6.3 G/DL (ref 6.4–8.2)
PROT SERPL-MCNC: 7 G/DL (ref 6–8.5)
PROT SERPL-MCNC: 7.5 G/DL (ref 6.4–8.2)
PROT UR STRIP-MCNC: NEGATIVE MG/DL
PROTHROMBIN TIME: 10.5 SEC (ref 9–11.1)
PROTHROMBIN TIME: 11 SEC (ref 9–11.1)
Q-T INTERVAL, ECG07: 512 MS
QRS DURATION, ECG06: 150 MS
QTC CALCULATION (BEZET), ECG08: 512 MS
RBC # BLD AUTO: 3.13 M/UL (ref 3.8–5.2)
RBC # BLD AUTO: 3.32 M/UL (ref 3.8–5.2)
RBC # BLD AUTO: 3.35 M/UL (ref 3.8–5.2)
RBC # BLD AUTO: 3.48 M/UL (ref 3.8–5.2)
RBC # BLD AUTO: 3.53 M/UL (ref 3.8–5.2)
RBC # BLD AUTO: 3.6 X10E6/UL (ref 3.77–5.28)
RBC #/AREA URNS HPF: ABNORMAL /HPF (ref 0–5)
SERVICE CMNT-IMP: NORMAL
SODIUM SERPL-SCNC: 140 MMOL/L (ref 134–144)
SODIUM SERPL-SCNC: 142 MMOL/L (ref 136–145)
SODIUM SERPL-SCNC: 143 MMOL/L (ref 136–145)
SODIUM SERPL-SCNC: 143 MMOL/L (ref 136–145)
SODIUM SERPL-SCNC: 144 MMOL/L (ref 136–145)
SP GR UR REFRACTOMETRY: 1.01 (ref 1–1.03)
TIBC SERPL-MCNC: 345 UG/DL (ref 250–450)
TRIGL SERPL-MCNC: 93 MG/DL (ref ?–150)
TRIGL SERPL-MCNC: 97 MG/DL (ref 0–149)
TSH SERPL DL<=0.005 MIU/L-ACNC: 1.68 UIU/ML (ref 0.45–4.5)
UA: UC IF INDICATED,UAUC: ABNORMAL
UROBILINOGEN UR QL STRIP.AUTO: 1 EU/DL (ref 0.2–1)
VENTRICULAR RATE, ECG03: 60 BPM
VIT B12 SERPL-MCNC: 97 PG/ML (ref 193–986)
VLDLC SERPL CALC-MCNC: 18.6 MG/DL
VLDLC SERPL CALC-MCNC: 19 MG/DL (ref 5–40)
WBC # BLD AUTO: 5.9 K/UL (ref 3.6–11)
WBC # BLD AUTO: 5.9 X10E3/UL (ref 3.4–10.8)
WBC # BLD AUTO: 6.3 K/UL (ref 3.6–11)
WBC # BLD AUTO: 6.5 K/UL (ref 3.6–11)
WBC URNS QL MICRO: ABNORMAL /HPF (ref 0–4)

## 2018-01-01 PROCEDURE — 74011250637 HC RX REV CODE- 250/637: Performed by: INTERNAL MEDICINE

## 2018-01-01 PROCEDURE — 83631 LACTOFERRIN FECAL (QUANT): CPT | Performed by: STUDENT IN AN ORGANIZED HEALTH CARE EDUCATION/TRAINING PROGRAM

## 2018-01-01 PROCEDURE — 74011000250 HC RX REV CODE- 250: Performed by: EMERGENCY MEDICINE

## 2018-01-01 PROCEDURE — 82607 VITAMIN B-12: CPT | Performed by: INTERNAL MEDICINE

## 2018-01-01 PROCEDURE — 96361 HYDRATE IV INFUSION ADD-ON: CPT

## 2018-01-01 PROCEDURE — 36415 COLL VENOUS BLD VENIPUNCTURE: CPT | Performed by: STUDENT IN AN ORGANIZED HEALTH CARE EDUCATION/TRAINING PROGRAM

## 2018-01-01 PROCEDURE — 83036 HEMOGLOBIN GLYCOSYLATED A1C: CPT | Performed by: INTERNAL MEDICINE

## 2018-01-01 PROCEDURE — 36415 COLL VENOUS BLD VENIPUNCTURE: CPT

## 2018-01-01 PROCEDURE — 36415 COLL VENOUS BLD VENIPUNCTURE: CPT | Performed by: INTERNAL MEDICINE

## 2018-01-01 PROCEDURE — 0FBG3ZX EXCISION OF PANCREAS, PERCUTANEOUS APPROACH, DIAGNOSTIC: ICD-10-PCS | Performed by: INTERNAL MEDICINE

## 2018-01-01 PROCEDURE — 74011000250 HC RX REV CODE- 250

## 2018-01-01 PROCEDURE — 85025 COMPLETE CBC W/AUTO DIFF WBC: CPT | Performed by: PHYSICIAN ASSISTANT

## 2018-01-01 PROCEDURE — 80053 COMPREHEN METABOLIC PANEL: CPT | Performed by: STUDENT IN AN ORGANIZED HEALTH CARE EDUCATION/TRAINING PROGRAM

## 2018-01-01 PROCEDURE — 85025 COMPLETE CBC W/AUTO DIFF WBC: CPT | Performed by: STUDENT IN AN ORGANIZED HEALTH CARE EDUCATION/TRAINING PROGRAM

## 2018-01-01 PROCEDURE — 96374 THER/PROPH/DIAG INJ IV PUSH: CPT

## 2018-01-01 PROCEDURE — 81001 URINALYSIS AUTO W/SCOPE: CPT | Performed by: PHYSICIAN ASSISTANT

## 2018-01-01 PROCEDURE — 65270000029 HC RM PRIVATE

## 2018-01-01 PROCEDURE — 83520 IMMUNOASSAY QUANT NOS NONAB: CPT | Performed by: SPECIALIST

## 2018-01-01 PROCEDURE — 77030003657 HC NDL SCLER BSC -B: Performed by: INTERNAL MEDICINE

## 2018-01-01 PROCEDURE — 77030013848 HC PK NSL EPITAX S&N -B

## 2018-01-01 PROCEDURE — 74011636320 HC RX REV CODE- 636/320: Performed by: PHYSICIAN ASSISTANT

## 2018-01-01 PROCEDURE — 82728 ASSAY OF FERRITIN: CPT | Performed by: INTERNAL MEDICINE

## 2018-01-01 PROCEDURE — 80061 LIPID PANEL: CPT | Performed by: INTERNAL MEDICINE

## 2018-01-01 PROCEDURE — 83880 ASSAY OF NATRIURETIC PEPTIDE: CPT | Performed by: PHYSICIAN ASSISTANT

## 2018-01-01 PROCEDURE — 80061 LIPID PANEL: CPT

## 2018-01-01 PROCEDURE — 85025 COMPLETE CBC W/AUTO DIFF WBC: CPT | Performed by: INTERNAL MEDICINE

## 2018-01-01 PROCEDURE — 93005 ELECTROCARDIOGRAM TRACING: CPT

## 2018-01-01 PROCEDURE — 82270 OCCULT BLOOD FECES: CPT

## 2018-01-01 PROCEDURE — 83690 ASSAY OF LIPASE: CPT | Performed by: INTERNAL MEDICINE

## 2018-01-01 PROCEDURE — 76060000032 HC ANESTHESIA 0.5 TO 1 HR: Performed by: INTERNAL MEDICINE

## 2018-01-01 PROCEDURE — 74011250636 HC RX REV CODE- 250/636: Performed by: INTERNAL MEDICINE

## 2018-01-01 PROCEDURE — 80048 BASIC METABOLIC PNL TOTAL CA: CPT | Performed by: STUDENT IN AN ORGANIZED HEALTH CARE EDUCATION/TRAINING PROGRAM

## 2018-01-01 PROCEDURE — 80053 COMPREHEN METABOLIC PANEL: CPT | Performed by: PHYSICIAN ASSISTANT

## 2018-01-01 PROCEDURE — 3E0G8GC INTRODUCTION OF OTHER THERAPEUTIC SUBSTANCE INTO UPPER GI, VIA NATURAL OR ARTIFICIAL OPENING ENDOSCOPIC: ICD-10-PCS | Performed by: INTERNAL MEDICINE

## 2018-01-01 PROCEDURE — 88305 TISSUE EXAM BY PATHOLOGIST: CPT | Performed by: INTERNAL MEDICINE

## 2018-01-01 PROCEDURE — 80048 BASIC METABOLIC PNL TOTAL CA: CPT | Performed by: INTERNAL MEDICINE

## 2018-01-01 PROCEDURE — 99285 EMERGENCY DEPT VISIT HI MDM: CPT

## 2018-01-01 PROCEDURE — 71260 CT THORAX DX C+: CPT

## 2018-01-01 PROCEDURE — 76040000007: Performed by: INTERNAL MEDICINE

## 2018-01-01 PROCEDURE — 74011250636 HC RX REV CODE- 250/636

## 2018-01-01 PROCEDURE — 36415 COLL VENOUS BLD VENIPUNCTURE: CPT | Performed by: PHYSICIAN ASSISTANT

## 2018-01-01 PROCEDURE — 85610 PROTHROMBIN TIME: CPT | Performed by: STUDENT IN AN ORGANIZED HEALTH CARE EDUCATION/TRAINING PROGRAM

## 2018-01-01 PROCEDURE — 74011250636 HC RX REV CODE- 250/636: Performed by: EMERGENCY MEDICINE

## 2018-01-01 PROCEDURE — 85610 PROTHROMBIN TIME: CPT | Performed by: EMERGENCY MEDICINE

## 2018-01-01 PROCEDURE — 85025 COMPLETE CBC W/AUTO DIFF WBC: CPT | Performed by: EMERGENCY MEDICINE

## 2018-01-01 PROCEDURE — 74011250637 HC RX REV CODE- 250/637: Performed by: EMERGENCY MEDICINE

## 2018-01-01 PROCEDURE — 74011250636 HC RX REV CODE- 250/636: Performed by: STUDENT IN AN ORGANIZED HEALTH CARE EDUCATION/TRAINING PROGRAM

## 2018-01-01 PROCEDURE — 88173 CYTOPATH EVAL FNA REPORT: CPT | Performed by: INTERNAL MEDICINE

## 2018-01-01 PROCEDURE — 74011250636 HC RX REV CODE- 250/636: Performed by: PHYSICIAN ASSISTANT

## 2018-01-01 PROCEDURE — 82272 OCCULT BLD FECES 1-3 TESTS: CPT | Performed by: STUDENT IN AN ORGANIZED HEALTH CARE EDUCATION/TRAINING PROGRAM

## 2018-01-01 PROCEDURE — 77030032490 HC SLV COMPR SCD KNE COVD -B

## 2018-01-01 PROCEDURE — 83690 ASSAY OF LIPASE: CPT | Performed by: PHYSICIAN ASSISTANT

## 2018-01-01 PROCEDURE — 77030028690 HC NDL ASPIR ULTRSND BSC -E: Performed by: INTERNAL MEDICINE

## 2018-01-01 PROCEDURE — 77030003406 HC NDL ASPIR BIOP OCOA -C: Performed by: INTERNAL MEDICINE

## 2018-01-01 PROCEDURE — 99283 EMERGENCY DEPT VISIT LOW MDM: CPT

## 2018-01-01 PROCEDURE — 74011636320 HC RX REV CODE- 636/320: Performed by: STUDENT IN AN ORGANIZED HEALTH CARE EDUCATION/TRAINING PROGRAM

## 2018-01-01 PROCEDURE — 94761 N-INVAS EAR/PLS OXIMETRY MLT: CPT

## 2018-01-01 PROCEDURE — 74177 CT ABD & PELVIS W/CONTRAST: CPT

## 2018-01-01 PROCEDURE — 85025 COMPLETE CBC W/AUTO DIFF WBC: CPT

## 2018-01-01 PROCEDURE — 80053 COMPREHEN METABOLIC PANEL: CPT

## 2018-01-01 PROCEDURE — 86301 IMMUNOASSAY TUMOR CA 19-9: CPT | Performed by: SPECIALIST

## 2018-01-01 PROCEDURE — 36415 COLL VENOUS BLD VENIPUNCTURE: CPT | Performed by: EMERGENCY MEDICINE

## 2018-01-01 PROCEDURE — 77030011648 HC PK NSL MEROCL MEDT -B

## 2018-01-01 PROCEDURE — 77080 DXA BONE DENSITY AXIAL: CPT

## 2018-01-01 PROCEDURE — 87086 URINE CULTURE/COLONY COUNT: CPT | Performed by: PHYSICIAN ASSISTANT

## 2018-01-01 PROCEDURE — 84443 ASSAY THYROID STIM HORMONE: CPT

## 2018-01-01 PROCEDURE — 83540 ASSAY OF IRON: CPT | Performed by: INTERNAL MEDICINE

## 2018-01-01 RX ORDER — SODIUM CHLORIDE 0.9 % (FLUSH) 0.9 %
5-10 SYRINGE (ML) INJECTION EVERY 8 HOURS
Status: ACTIVE | OUTPATIENT
Start: 2018-01-01 | End: 2018-01-01

## 2018-01-01 RX ORDER — ONDANSETRON 2 MG/ML
4 INJECTION INTRAMUSCULAR; INTRAVENOUS
Status: DISCONTINUED | OUTPATIENT
Start: 2018-01-01 | End: 2018-01-01 | Stop reason: HOSPADM

## 2018-01-01 RX ORDER — SODIUM CHLORIDE 0.9 % (FLUSH) 0.9 %
5-10 SYRINGE (ML) INJECTION AS NEEDED
Status: ACTIVE | OUTPATIENT
Start: 2018-01-01 | End: 2018-01-01

## 2018-01-01 RX ORDER — DEXTROMETHORPHAN/PSEUDOEPHED 2.5-7.5/.8
1.2 DROPS ORAL
Status: DISCONTINUED | OUTPATIENT
Start: 2018-01-01 | End: 2018-01-01 | Stop reason: HOSPADM

## 2018-01-01 RX ORDER — EPINEPHRINE 0.1 MG/ML
1 INJECTION INTRACARDIAC; INTRAVENOUS
Status: DISCONTINUED | OUTPATIENT
Start: 2018-01-01 | End: 2018-01-01 | Stop reason: HOSPADM

## 2018-01-01 RX ORDER — SPIRONOLACTONE 25 MG/1
25 TABLET ORAL DAILY
Qty: 90 TAB | Refills: 1 | Status: SHIPPED | OUTPATIENT
Start: 2018-01-01

## 2018-01-01 RX ORDER — CHROMIUM PICOLINATE 200 MCG
1 TABLET ORAL DAILY
COMMUNITY

## 2018-01-01 RX ORDER — SODIUM CHLORIDE 9 MG/ML
75 INJECTION, SOLUTION INTRAVENOUS CONTINUOUS
Status: DISCONTINUED | OUTPATIENT
Start: 2018-01-01 | End: 2018-01-01 | Stop reason: HOSPADM

## 2018-01-01 RX ORDER — SERTRALINE HYDROCHLORIDE 50 MG/1
50 TABLET, FILM COATED ORAL EVERY EVENING
Status: DISCONTINUED | OUTPATIENT
Start: 2018-01-01 | End: 2018-01-01 | Stop reason: HOSPADM

## 2018-01-01 RX ORDER — SPIRONOLACTONE 25 MG/1
25 TABLET ORAL DAILY
Qty: 90 TAB | Refills: 1 | OUTPATIENT
Start: 2018-01-01

## 2018-01-01 RX ORDER — PROPOFOL 10 MG/ML
INJECTION, EMULSION INTRAVENOUS
Status: DISCONTINUED | OUTPATIENT
Start: 2018-01-01 | End: 2018-01-01

## 2018-01-01 RX ORDER — AMIODARONE HYDROCHLORIDE 200 MG/1
200 TABLET ORAL DAILY
Qty: 90 TAB | Refills: 1 | Status: SHIPPED | OUTPATIENT
Start: 2018-01-01

## 2018-01-01 RX ORDER — PROPOFOL 10 MG/ML
INJECTION, EMULSION INTRAVENOUS AS NEEDED
Status: DISCONTINUED | OUTPATIENT
Start: 2018-01-01 | End: 2018-01-01 | Stop reason: HOSPADM

## 2018-01-01 RX ORDER — HYDROCODONE BITARTRATE AND ACETAMINOPHEN 5; 325 MG/1; MG/1
1 TABLET ORAL
Status: DISCONTINUED | OUTPATIENT
Start: 2018-01-01 | End: 2018-01-01 | Stop reason: HOSPADM

## 2018-01-01 RX ORDER — ATROPINE SULFATE 0.1 MG/ML
0.5 INJECTION INTRAVENOUS
Status: DISCONTINUED | OUTPATIENT
Start: 2018-01-01 | End: 2018-01-01 | Stop reason: HOSPADM

## 2018-01-01 RX ORDER — HYDRALAZINE HYDROCHLORIDE 20 MG/ML
10 INJECTION INTRAMUSCULAR; INTRAVENOUS
Status: DISCONTINUED | OUTPATIENT
Start: 2018-01-01 | End: 2018-01-01 | Stop reason: HOSPADM

## 2018-01-01 RX ORDER — MIDAZOLAM HYDROCHLORIDE 1 MG/ML
.25-5 INJECTION, SOLUTION INTRAMUSCULAR; INTRAVENOUS
Status: DISCONTINUED | OUTPATIENT
Start: 2018-01-01 | End: 2018-01-01 | Stop reason: HOSPADM

## 2018-01-01 RX ORDER — CARVEDILOL 6.25 MG/1
6.25 TABLET ORAL 2 TIMES DAILY WITH MEALS
Qty: 180 TAB | Refills: 1 | Status: SHIPPED | OUTPATIENT
Start: 2018-01-01

## 2018-01-01 RX ORDER — FOLIC ACID 1 MG/1
1 TABLET ORAL DAILY
Status: DISCONTINUED | OUTPATIENT
Start: 2018-01-01 | End: 2018-01-01 | Stop reason: HOSPADM

## 2018-01-01 RX ORDER — FACIAL-BODY WIPES
10 EACH TOPICAL DAILY PRN
Status: DISCONTINUED | OUTPATIENT
Start: 2018-01-01 | End: 2018-01-01 | Stop reason: HOSPADM

## 2018-01-01 RX ORDER — SODIUM CHLORIDE 0.9 % (FLUSH) 0.9 %
10 SYRINGE (ML) INJECTION
Status: ACTIVE | OUTPATIENT
Start: 2018-01-01 | End: 2018-01-01

## 2018-01-01 RX ORDER — ACETAMINOPHEN 325 MG/1
650 TABLET ORAL
Status: DISCONTINUED | OUTPATIENT
Start: 2018-01-01 | End: 2018-01-01 | Stop reason: HOSPADM

## 2018-01-01 RX ORDER — FLUMAZENIL 0.1 MG/ML
0.2 INJECTION INTRAVENOUS
Status: DISCONTINUED | OUTPATIENT
Start: 2018-01-01 | End: 2018-01-01 | Stop reason: HOSPADM

## 2018-01-01 RX ORDER — NALOXONE HYDROCHLORIDE 0.4 MG/ML
0.4 INJECTION, SOLUTION INTRAMUSCULAR; INTRAVENOUS; SUBCUTANEOUS AS NEEDED
Status: DISCONTINUED | OUTPATIENT
Start: 2018-01-01 | End: 2018-01-01 | Stop reason: HOSPADM

## 2018-01-01 RX ORDER — SODIUM CHLORIDE 9 MG/ML
75 INJECTION, SOLUTION INTRAVENOUS CONTINUOUS
Status: DISPENSED | OUTPATIENT
Start: 2018-01-01 | End: 2018-01-01

## 2018-01-01 RX ORDER — NALOXONE HYDROCHLORIDE 0.4 MG/ML
0.4 INJECTION, SOLUTION INTRAMUSCULAR; INTRAVENOUS; SUBCUTANEOUS
Status: DISCONTINUED | OUTPATIENT
Start: 2018-01-01 | End: 2018-01-01 | Stop reason: HOSPADM

## 2018-01-01 RX ORDER — LIDOCAINE HYDROCHLORIDE 20 MG/ML
INJECTION, SOLUTION EPIDURAL; INFILTRATION; INTRACAUDAL; PERINEURAL AS NEEDED
Status: DISCONTINUED | OUTPATIENT
Start: 2018-01-01 | End: 2018-01-01 | Stop reason: HOSPADM

## 2018-01-01 RX ORDER — ALENDRONATE SODIUM 70 MG/1
70 TABLET ORAL
Qty: 12 TAB | Refills: 3 | Status: SHIPPED | OUTPATIENT
Start: 2018-01-01

## 2018-01-01 RX ORDER — FUROSEMIDE 20 MG/1
TABLET ORAL
Qty: 90 TAB | Refills: 1 | Status: SHIPPED | OUTPATIENT
Start: 2018-01-01

## 2018-01-01 RX ORDER — PROPOFOL 10 MG/ML
INJECTION, EMULSION INTRAVENOUS
Status: DISCONTINUED | OUTPATIENT
Start: 2018-01-01 | End: 2018-01-01 | Stop reason: HOSPADM

## 2018-01-01 RX ORDER — SODIUM CHLORIDE 0.9 % (FLUSH) 0.9 %
5-10 SYRINGE (ML) INJECTION AS NEEDED
Status: DISCONTINUED | OUTPATIENT
Start: 2018-01-01 | End: 2018-01-01 | Stop reason: HOSPADM

## 2018-01-01 RX ORDER — FUROSEMIDE 20 MG/1
TABLET ORAL
Qty: 90 TAB | Refills: 1 | Status: SHIPPED | OUTPATIENT
Start: 2018-01-01 | End: 2018-01-01 | Stop reason: SDUPTHER

## 2018-01-01 RX ORDER — LIDOCAINE HYDROCHLORIDE 20 MG/ML
15 SOLUTION OROPHARYNGEAL
Status: COMPLETED | OUTPATIENT
Start: 2018-01-01 | End: 2018-01-01

## 2018-01-01 RX ORDER — MORPHINE SULFATE 4 MG/ML
1 INJECTION INTRAVENOUS
Status: DISCONTINUED | OUTPATIENT
Start: 2018-01-01 | End: 2018-01-01 | Stop reason: HOSPADM

## 2018-01-01 RX ORDER — MORPHINE SULFATE 4 MG/ML
2 INJECTION INTRAVENOUS
Status: COMPLETED | OUTPATIENT
Start: 2018-01-01 | End: 2018-01-01

## 2018-01-01 RX ORDER — AMIODARONE HYDROCHLORIDE 200 MG/1
200 TABLET ORAL DAILY
Status: DISCONTINUED | OUTPATIENT
Start: 2018-01-01 | End: 2018-01-01 | Stop reason: HOSPADM

## 2018-01-01 RX ORDER — SODIUM CHLORIDE 0.9 % (FLUSH) 0.9 %
10 SYRINGE (ML) INJECTION
Status: COMPLETED | OUTPATIENT
Start: 2018-01-01 | End: 2018-01-01

## 2018-01-01 RX ORDER — SODIUM CHLORIDE 0.9 % (FLUSH) 0.9 %
5-10 SYRINGE (ML) INJECTION EVERY 8 HOURS
Status: DISCONTINUED | OUTPATIENT
Start: 2018-01-01 | End: 2018-01-01 | Stop reason: HOSPADM

## 2018-01-01 RX ORDER — SODIUM CHLORIDE 9 MG/ML
50 INJECTION, SOLUTION INTRAVENOUS
Status: COMPLETED | OUTPATIENT
Start: 2018-01-01 | End: 2018-01-01

## 2018-01-01 RX ORDER — SODIUM CHLORIDE 9 MG/ML
50 INJECTION, SOLUTION INTRAVENOUS
Status: ACTIVE | OUTPATIENT
Start: 2018-01-01 | End: 2018-01-01

## 2018-01-01 RX ORDER — HYDROCODONE BITARTRATE AND ACETAMINOPHEN 5; 325 MG/1; MG/1
1 TABLET ORAL
Qty: 30 TAB | Refills: 0 | Status: SHIPPED | OUTPATIENT
Start: 2018-01-01

## 2018-01-01 RX ORDER — SODIUM CHLORIDE 9 MG/ML
250 INJECTION, SOLUTION INTRAVENOUS ONCE
Status: COMPLETED | OUTPATIENT
Start: 2018-01-01 | End: 2018-01-01

## 2018-01-01 RX ORDER — OXYMETAZOLINE HCL 0.05 %
2 SPRAY, NON-AEROSOL (ML) NASAL
Status: COMPLETED | OUTPATIENT
Start: 2018-01-01 | End: 2018-01-01

## 2018-01-01 RX ADMIN — SODIUM CHLORIDE: 900 INJECTION, SOLUTION INTRAVENOUS at 11:54

## 2018-01-01 RX ADMIN — IOPAMIDOL 80 ML: 755 INJECTION, SOLUTION INTRAVENOUS at 12:44

## 2018-01-01 RX ADMIN — AMIODARONE HYDROCHLORIDE 200 MG: 200 TABLET ORAL at 08:42

## 2018-01-01 RX ADMIN — MORPHINE SULFATE 2 MG: 4 INJECTION INTRAVENOUS at 16:34

## 2018-01-01 RX ADMIN — PROPOFOL 120 MG: 10 INJECTION, EMULSION INTRAVENOUS at 12:41

## 2018-01-01 RX ADMIN — SODIUM CHLORIDE 75 ML/HR: 900 INJECTION, SOLUTION INTRAVENOUS at 03:56

## 2018-01-01 RX ADMIN — SERTRALINE HYDROCHLORIDE 50 MG: 50 TABLET ORAL at 21:49

## 2018-01-01 RX ADMIN — SODIUM CHLORIDE 75 ML/HR: 900 INJECTION, SOLUTION INTRAVENOUS at 09:28

## 2018-01-01 RX ADMIN — SERTRALINE HYDROCHLORIDE 50 MG: 50 TABLET ORAL at 18:01

## 2018-01-01 RX ADMIN — AMIODARONE HYDROCHLORIDE 200 MG: 200 TABLET ORAL at 10:49

## 2018-01-01 RX ADMIN — Medication 10 ML: at 22:33

## 2018-01-01 RX ADMIN — Medication 10 ML: at 17:48

## 2018-01-01 RX ADMIN — Medication 10 ML: at 21:49

## 2018-01-01 RX ADMIN — FOLIC ACID 1 MG: 1 TABLET ORAL at 08:42

## 2018-01-01 RX ADMIN — LIDOCAINE HYDROCHLORIDE 60 MG: 20 INJECTION, SOLUTION EPIDURAL; INFILTRATION; INTRACAUDAL; PERINEURAL at 12:15

## 2018-01-01 RX ADMIN — OXYMETAZOLINE HYDROCHLORIDE 2 SPRAY: 5 SPRAY NASAL at 08:44

## 2018-01-01 RX ADMIN — Medication 10 ML: at 23:31

## 2018-01-01 RX ADMIN — SODIUM CHLORIDE 75 ML/HR: 900 INJECTION, SOLUTION INTRAVENOUS at 12:16

## 2018-01-01 RX ADMIN — SODIUM CHLORIDE 75 ML/HR: 900 INJECTION, SOLUTION INTRAVENOUS at 20:31

## 2018-01-01 RX ADMIN — FOLIC ACID 1 MG: 1 TABLET ORAL at 10:49

## 2018-01-01 RX ADMIN — SODIUM CHLORIDE 50 ML/HR: 900 INJECTION, SOLUTION INTRAVENOUS at 14:12

## 2018-01-01 RX ADMIN — PROPOFOL 120 MCG/KG/MIN: 10 INJECTION, EMULSION INTRAVENOUS at 12:21

## 2018-01-01 RX ADMIN — SODIUM CHLORIDE 75 ML/HR: 900 INJECTION, SOLUTION INTRAVENOUS at 21:02

## 2018-01-01 RX ADMIN — Medication 10 ML: at 13:43

## 2018-01-01 RX ADMIN — SODIUM CHLORIDE 250 ML: 900 INJECTION, SOLUTION INTRAVENOUS at 16:35

## 2018-01-01 RX ADMIN — Medication 10 ML: at 05:36

## 2018-01-01 RX ADMIN — AMIODARONE HYDROCHLORIDE 200 MG: 200 TABLET ORAL at 08:54

## 2018-01-01 RX ADMIN — AMIODARONE HYDROCHLORIDE 200 MG: 200 TABLET ORAL at 09:13

## 2018-01-01 RX ADMIN — Medication 10 ML: at 05:21

## 2018-01-01 RX ADMIN — FOLIC ACID 1 MG: 1 TABLET ORAL at 17:50

## 2018-01-01 RX ADMIN — IOPAMIDOL 100 ML: 755 INJECTION, SOLUTION INTRAVENOUS at 14:12

## 2018-01-01 RX ADMIN — SERTRALINE HYDROCHLORIDE 50 MG: 50 TABLET ORAL at 18:04

## 2018-01-01 RX ADMIN — SODIUM CHLORIDE 75 ML/HR: 900 INJECTION, SOLUTION INTRAVENOUS at 11:33

## 2018-01-01 RX ADMIN — SERTRALINE HYDROCHLORIDE 50 MG: 50 TABLET ORAL at 17:47

## 2018-01-01 RX ADMIN — Medication 10 ML: at 14:12

## 2018-01-01 RX ADMIN — LIDOCAINE HYDROCHLORIDE 15 ML: 20 SOLUTION ORAL; TOPICAL at 08:44

## 2018-01-01 RX ADMIN — Medication 10 ML: at 18:04

## 2018-01-01 RX ADMIN — SODIUM CHLORIDE 75 ML/HR: 900 INJECTION, SOLUTION INTRAVENOUS at 23:33

## 2018-02-01 NOTE — TELEPHONE ENCOUNTER
Request for lasix 20mg every day. Last office visit 6/19/17, next office visit 6/28/18. Refills per verbal order from Dr. Mónica Nieves.

## 2018-02-23 NOTE — TELEPHONE ENCOUNTER
Request for spironolactone 25mg every day, amiodarone 200mg every day, coreg 6.25mg twice a day, lasix 20mg every day, and entresto 24/26mg twice a day. Last office visit 6/19/17, next office visit 6/28/18. Refills per verbal order from Dr. Diony Navarrete.

## 2018-02-24 NOTE — DISCHARGE INSTRUCTIONS
Nosebleeds: Care Instructions  Your Care Instructions    Nosebleeds are common, especially if you have colds or allergies. Many things can cause a nosebleed. Some nosebleeds stop on their own with pressure. Others need packing. Some get cauterized (sealed). If you have gauze or other packing materials in your nose, you will need to follow up with your doctor to have the packing removed. You may need more treatment if you get nosebleeds a lot. The doctor has checked you carefully, but problems can develop later. If you notice any problems or new symptoms, get medical treatment right away. Follow-up care is a key part of your treatment and safety. Be sure to make and go to all appointments, and call your doctor if you are having problems. It's also a good idea to know your test results and keep a list of the medicines you take. How can you care for yourself at home? · If you get another nosebleed:  ¨ Sit up and tilt your head slightly forward. This keeps blood from going down your throat. ¨ Use your thumb and index finger to pinch your nose shut for 10 minutes. Use a clock. Do not check to see if the bleeding has stopped before the 10 minutes are up. If the bleeding has not stopped, pinch your nose shut for another 10 minutes. ¨ When the bleeding has stopped, try not to pick, rub, or blow your nose for 12 hours. Avoiding these things helps keep your nose from bleeding again. · If your doctor prescribed antibiotics, take them as directed. Do not stop taking them just because you feel better. You need to take the full course of antibiotics. To prevent nosebleeds  · Do not blow your nose too hard. · Try not to lift or strain after a nosebleed. · Raise your head on a pillow while you sleep. · Put a thin layer of a saline- or water-based nasal gel, such as NasoGel, inside your nose. Put it on the septum, which divides your nostrils. This will prevent dryness that can cause nosebleeds.   · Use a vaporizer or humidifier to add moisture to your bedroom. Follow the directions for cleaning the machine. · Do not use aspirin, ibuprofen (Advil, Motrin), or naproxen (Aleve) for 36 to 48 hours after a nosebleed unless your doctor tells you to. You can use acetaminophen (Tylenol) for pain relief. · Talk to your doctor about stopping any other medicines you are taking. Some medicines may make you more likely to get a nosebleed. · Do not use cold medicines or nasal sprays without first talking to your doctor. They can make your nose dry. When should you call for help? Call 911 anytime you think you may need emergency care. For example, call if:  ? · You passed out (lost consciousness). ?Call your doctor now or seek immediate medical care if:  ? · You get another nosebleed and your nose is still bleeding after you have applied pressure 3 times for 10 minutes each time (30 minutes total). ? · There is a lot of blood running down the back of your throat even after you pinch your nose and tilt your head forward. ? · You have a fever. ? · You have sinus pain. ? Watch closely for changes in your health, and be sure to contact your doctor if:  ? · You get nosebleeds often, even if they stop. ? · You do not get better as expected. Where can you learn more? Go to http://john-armin.info/. Enter S156 in the search box to learn more about \"Nosebleeds: Care Instructions. \"  Current as of: March 20, 2017  Content Version: 11.4  © 8873-6844 [employer]. Care instructions adapted under license by [employer] (which disclaims liability or warranty for this information). If you have questions about a medical condition or this instruction, always ask your healthcare professional. [address] any warranty or liability for your use of this information.

## 2018-02-24 NOTE — ED PROVIDER NOTES
EMERGENCY DEPARTMENT HISTORY AND PHYSICAL EXAM      Date: 2/24/2018  Patient Name: Jimy Foreman    History of Presenting Illness     Chief Complaint   Patient presents with    Epistaxis     episode yesterday that resolved and then started back up this morning, pt on xarelto       History Provided By: Patient    HPI: Jimy Foreman, 78 y.o. female with PMHx significant for CHF and A-fib (on xarelto), presents ambulatory to the ED with cc of intermittent R nare epistaxis since yesterday with most recent episode beginning at 0400 this morning. Pt denies current pain, so severity or modifying factors related to pain cannot be assessed. She notes associated rhinorrhea/sinus congestion. She reports onset of epistaxis s/p blowing her nose \"really hard\"; she notes her nasal passage was dry yesterday prior to the episode. Pt endorses she was able to control the bleeding shortly after it began but subsequently had return of continued bleeding, prompting ED evaluation. Per pt records, she has hx of A-fib and is on xarelto. She denies hx of ENT evaluation. Pt specifically denies fever/chills, nasal pain, SOB, CP, cough, hemoptysis, abdominal pain, hematemesis, N/V/D, urinary sx, or rash. PCP: Shanti Lynch MD    There are no other complaints, changes, or physical findings at this time. Current Outpatient Prescriptions   Medication Sig Dispense Refill    spironolactone (ALDACTONE) 25 mg tablet Take 1 Tab by mouth daily. Indications: chronic heart failure 90 Tab 1    amiodarone (CORDARONE) 200 mg tablet Take 1 Tab by mouth daily. 90 Tab 1    carvedilol (COREG) 6.25 mg tablet Take 1 Tab by mouth two (2) times daily (with meals). 180 Tab 1    furosemide (LASIX) 20 mg tablet TAKE 1 TABLET BY MOUTH DAILY 90 Tab 1    sacubitril-valsartan (ENTRESTO) 24 mg/26 mg tablet Take 1 Tab by mouth two (2) times a day.  180 Tab 1    sertraline (ZOLOFT) 50 mg tablet TAKE 1 TABLET BY MOUTH DAILY 90 Tab 4    rivaroxaban (XARELTO) 20 mg tab tablet TAKE 1 TABLET BY MOUTH EVERY DAY 90 Tab 3       Past History     Past Medical History:  Past Medical History:   Diagnosis Date    Atrial fibrillation ([address]     Cardiomyopathy, nonischemic (HCC)     Congestive heart failure, unspecified     Depression     History of amiodarone therapy 12/30/2011    ICD (implantable cardiac defibrillator) in place 9/18/09    [facility]       Past Surgical History:  Past Surgical History:   Procedure Laterality Date    ECHO 2D ADULT  4/1/2011    EF 30%, mild to moderate LA enlargement, mild MR    HX HEART CATHETERIZATION  2006    no CAD, EF 25%    HX PACEMAKER      HX PACEMAKER PLACEMENT  2009    IR FLUOROSCOPY < 1 HOUR  7/30/2012         DEBBI US BX BREAST LT 1ST LESION W/CLIP AND SPECIMEN Left     2015--neg    [place] REMV TRANSVEN PACER ELECTRODE,1 LEAD  6/21/2016            Family History:  Family History   Problem Relation Age of Onset    High Cholesterol Mother        Social History:  Social History   Substance Use Topics    Smoking status: Never Smoker    Smokeless tobacco: Never Used    Alcohol use 2.5 oz/week     5 Cans of beer per week      Comment: occasional       Allergies:  No Known Allergies      Review of Systems   Review of Systems   Constitutional: Negative for chills and fever. HENT: Positive for congestion, nosebleeds (R nare) and rhinorrhea. Denies nasal pain   Respiratory: Negative for cough and shortness of breath. Denies hemoptysis   Cardiovascular: Negative for chest pain. Gastrointestinal: Negative for abdominal pain, diarrhea, nausea and vomiting. Denies hematemesis   Genitourinary: Negative for dysuria, frequency, hematuria and urgency. Musculoskeletal: Negative. Skin: Negative for rash. All other systems reviewed and are negative.       Physical Exam   Physical Exam     Vital signs and nursing notes reviewed    CONSTITUTIONAL: Alert, in no apparent distress; well-developed; well-nourished. HEAD:  Normocephalic, atraumatic  EYES: PERRL; EOM's intact. ENTM: Nose: clear nasal drainage from L nare, non-pulsatile dark venous blood oozing from R nare; Throat: small amount of blood in the posterior oropharynx, no erythema or exudate, handling secretions without difficulty, mucous membranes moist  Neck:  Supple. trachea is midline. RESP: Chest clear, equal breath sounds. - W/R/R  CV: S1 and S2 WNL; No murmurs, gallops or rubs. 2+ radial and DP pulses bilaterally. GI: non-distended, normal bowel sounds, abdomen soft and non-tender. No masses or organomegaly. : No costo-vertebral angle tenderness. BACK:  Non-tender, normal appearance  UPPER EXT:  Normal inspection. no joint or soft tissue swelling  LOWER EXT: No edema, no calf tenderness. NEURO: Alert and oriented x3, 5/5 strength and light touch sensation intact in bilateral upper and lower extremities. SKIN: No rashes; Warm and dry  PSYCH: Normal mood, normal affect      Diagnostic Study Results     Labs -     Recent Results (from the past 12 hour(s))   CBC WITH AUTOMATED DIFF    Collection Time: 02/24/18  8:45 AM   Result Value Ref Range    WBC 6.5 3.6 - 11.0 K/uL    RBC 3.48 (L) 3.80 - 5.20 M/uL    HGB 11.7 11.5 - 16.0 g/dL    HCT 34.4 (L) 35.0 - 47.0 %    MCV 98.9 80.0 - 99.0 FL    MCH 33.6 26.0 - 34.0 PG    MCHC 34.0 30.0 - 36.5 g/dL    RDW 12.2 11.5 - 14.5 %    PLATELET 544 944 - 708 K/uL    MPV 9.4 8.9 - 12.9 FL    NRBC 0.0 0  WBC    ABSOLUTE NRBC 0.00 0.00 - 0.01 K/uL    NEUTROPHILS 53 32 - 75 %    LYMPHOCYTES 36 12 - 49 %    MONOCYTES 9 5 - 13 %    EOSINOPHILS 1 0 - 7 %    BASOPHILS 1 0 - 1 %    IMMATURE GRANULOCYTES 0 0.0 - 0.5 %    ABS. NEUTROPHILS 3.5 1.8 - 8.0 K/UL    ABS. LYMPHOCYTES 2.4 0.8 - 3.5 K/UL    ABS. MONOCYTES 0.6 0.0 - 1.0 K/UL    ABS. EOSINOPHILS 0.1 0.0 - 0.4 K/UL    ABS. BASOPHILS 0.0 0.0 - 0.1 K/UL    ABS. IMM.  GRANS. 0.0 0.00 - 0.04 K/UL    DF AUTOMATED     PROTHROMBIN TIME + INR Collection Time: 02/24/18  8:45 AM   Result Value Ref Range    INR 1.1 0.9 - 1.1      Prothrombin time 11.0 9.0 - 11.1 sec       Medical Decision Making   I am the first provider for this patient. I reviewed the vital signs, available nursing notes, past medical history, past surgical history, family history and social history. Vital Signs-Reviewed the patient's vital signs. Patient Vitals for the past 12 hrs:   Temp Pulse Resp BP SpO2   02/24/18 0754 97.7 °F (36.5 °C) 75 16 126/82 97 %     Records Reviewed: Nursing Notes and Old Medical Records    Provider Notes (Medical Decision Making):   77 yo female with R sided nosebleed, well controlled with nasal packing. Hemodynamically stable. Reassuring labs. Understands will need packing removed per ENT. Dr. Jeny Sherwood information provided. Pt and  understand return precautions. No airway compromise today. No concern for new bleeding disorder. ED Course:   Initial assessment performed. The patients presenting problems have been discussed, and they are in agreement with the care plan formulated and outlined with them. I have encouraged them to ask questions as they arise throughout their visit. Procedure note:   Pre-procedure diagnosis: epistaxis of the R nare  Post-procedure diagnosis: epistaxis of the R nare  An Anterior Posterior Rhino rocket with soaked in 0.05% Afrin and 2% lidocaine and inserted into the R nare of the patient. Patient tolerated well, mild oozing of venous blood, non-pulsatile, post procedure. Estimated blood loss from actual procedure: None. Airway patent and handling secretions without difficulty. Progress Note:  9:09 AM  Bleeding controlled after packing. Written by LIVIA Liang, as dictated by Babita Santo MD.       Disposition:    DISCHARGE NOTE  9:32 AM  The patient has been re-evaluated and is ready for discharge. Reviewed available results with patient. Counseled pt on diagnosis and care plan.  Pt has expressed understanding, and all questions have been answered. Pt agrees with plan and agrees to F/U as recommended, or return to the ED if their sxs worsen. Discharge instructions have been provided and explained to the pt, along with reasons to return to the ED. Written by Shirin Garcia, ED Scribe, as dictated by Maggie De Jesus MD.    PLAN:  1. Discharge Medication List as of 2/24/2018  9:31 AM        2. Follow-up Information     Follow up With Details Comments Contact Info    Luca Huntley MD  Please call on Monday for an appointment on Tuesday to get packing removed. [facility]  [phone]      [facility] EMERGENCY DEPT   [address] [address]  [phone]    [facility] EMERGENCY DEPT  If symptoms worsen including repeat nosebleed, difficulty breathing or other new concerning symptoms. [address] [place] [address]  [phone]        Return to ED if worse     Diagnosis     Clinical Impression:   1. Epistaxis        Attestations: This note is prepared by Shirin Garcia, acting as Scribe for Maggie De Jesus MD.    Maggie De Jesus MD: The scribe's documentation has been prepared under my direction and personally reviewed by me in its entirety. I confirm that the note above accurately reflects all work, treatment, procedures, and medical decision making performed by me.

## 2018-03-06 NOTE — MR AVS SNAPSHOT
Visit Information Date & Time Provider Department Dept. Phone Encounter #  
 1/20/2017 10:40 AM Sun Matias MD [facility] [place] [place] [phone] [id number] Your Appointments 1/20/2017 10:30 AM  
PACEMAKER with PACEMAKER3MAGGY [facility] ([place] SCHEDULING) Appt Note: higinio sci BiV ICD/rc/Escobar 3 month b 10-13-16  
 [address] [place] [address]  
[address] [facility]  
  
    
 1/20/2017 10:40 AM  
ESTABLISHED PATIENT with Sun Matias MD  
[facility] ([address]) Appt Note: higinio sci BiV ICD/rc/Escobar 3 month b 10-13-16  
 [address]  [address] [place] [address]  
[address] [address] Upcoming Health Maintenance Date Due DTaP/Tdap/Td series (1 - Tdap) 3/21/1972 GLAUCOMA SCREENING Q2Y 3/21/2016 OSTEOPOROSIS SCREENING (DEXA) 3/21/2016 INFLUENZA AGE 9 TO ADULT 8/1/2016 FOBT Q 1 YEAR AGE 50-75 3/16/2017 Pneumococcal 65+ Low/Medium Risk (2 of 2 - PCV13) 6/2/2017 MEDICARE YEARLY EXAM 6/3/2017 BREAST CANCER SCRN MAMMOGRAM 6/9/2017 Allergies as of 1/20/2017  Review Complete On: 1/20/2017 By: Sun Matias MD  
 No Known Allergies Current Immunizations  Never Reviewed Name Date Pneumococcal Polysaccharide (PPSV-23) 6/2/2016 Not reviewed this visit You Were Diagnosed With   
  
 Codes Comments PAF (paroxysmal atrial fibrillation) ([facility]    -  Primary ICD-10-CM: I48.0 ICD-9-CM: 427.31 Idiopathic cardiomyopathy ([facility]     ICD-10-CM: I42.8 ICD-9-CM: 425.4 Essential hypertension     ICD-10-CM: I10 
ICD-9-CM: 401.9 Vitals BP Pulse Height(growth percentile) Weight(growth percentile) BMI OB Status  (!) 88/60 (BP 1 Location: Right arm, BP Patient Position: Sitting) 72 5' 2\" (1.575 m) 177 lb (80.3 kg) 32.37 kg/m2 Postmenopausal  
 Smoking Status Never Smoker Vitals History BMI and BSA Data Body Mass Index Body Surface Area  
 32.37 kg/m 2 1.87 m 2 Preferred Pharmacy Pharmacy Name Phone Sierra Mendez, [place] [address] [phone] Your Updated Medication List  
  
   
This list is accurate as of: 1/20/17 10:29 AM.  Always use your most recent med list.  
  
  
  
  
 amiodarone 200 mg tablet Commonly known as:  CORDARONE Take 1 Tab by mouth daily. carvedilol 6.25 mg tablet Commonly known as:  Marah Leigh Take 1 Tab by mouth two (2) times daily (with meals). furosemide 20 mg tablet Commonly known as:  LASIX TAKE 1 TABLET BY MOUTH DAILY losartan 25 mg tablet Commonly known as:  COZAAR Take 1 Tab by mouth daily. sertraline 50 mg tablet Commonly known as:  ZOLOFT  
TAKE 1 TABLET BY MOUTH DAILY  
  
 spironolactone 25 mg tablet Commonly known as:  ALDACTONE Take 1 Tab by mouth daily. Indications: CHRONIC HEART FAILURE  
  
 XARELTO 20 mg Tab tablet Generic drug:  rivaroxaban TAKE 1 TABLET BY MOUTH EVERY DAY We Performed the Following AMB POC EKG ROUTINE W/ 12 LEADS, INTER & REP [73493 CPT(R)] To-Do List   
 Around 01/20/2017 Imaging:  XR CHEST PA LAT Patient Instructions Have chest x-ray done to evaluate your amiodarone use, atrial lead position. Follow up with Dr. Jana Bhatt in 6 months. Introducing [facility]! [facility] introduces DropMat patient portal. Now you can access parts of your medical record, email your doctor's office, and request medication refills online. 1. In your internet browser, go to [email or web address] 2. Click on the First Time User? Click Here link in the Sign In box. You will see the New Member Sign Up page. 3. Enter your Pulselocker Access Code exactly as it appears below. You will not need to use this code after youve completed the sign-up process. If you do not sign up before the expiration date, you must request a new code. · Pulselocker Access Code: [email or web address] Expires: 4/20/2017 10:29 AM 
 
4. Enter the last four digits of your Social Security Number (xxxx) and Date of Birth (mm/dd/yyyy) as indicated and click Submit. You will be taken to the next sign-up page. 5. Create a Pulselocker ID. This will be your Pulselocker login ID and cannot be changed, so think of one that is secure and easy to remember. 6. Create a Pulselocker password. You can change your password at any time. 7. Enter your Password Reset Question and Answer. This can be used at a later time if you forget your password. 8. Enter your e-mail address. You will receive e-mail notification when new information is available in [address] 9. Click Sign Up. You can now view and download portions of your medical record. 10. Click the Download Summary menu link to download a portable copy of your medical information. If you have questions, please visit the Frequently Asked Questions section of the Pulselocker website. Remember, Pulselocker is NOT to be used for urgent needs. For medical emergencies, dial 911. Now available from your iPhone and Android! Please provide this summary of care documentation to your next provider. Your primary care clinician is listed as Rayericka Serrano. If you have any questions after today's visit, please call [phone]. Statement Selected

## 2018-03-13 NOTE — MR AVS SNAPSHOT
[address] [facility] [address] 
 
 
 [address] [place] [address] 
[phone] Patient: Oleksandr Zarate MRN:  [id number] Visit Information Date & Time Provider Department Dept. Phone Encounter #  
 3/13/2018 10:00 AM [facility] [phone] [id number] Follow-up Instructions Return in about 6 months (around 9/13/2018). Your Appointments 6/28/2018 10:45 AM  
PACEMAKER with MAGGY TONY [facility] ([place] SCHEDULING) Appt Note: higinio sci BiV ICD/rc/Escobar 1 yr b   LAT  
 [address] [place] [address]  
[address] [place]  
  
    
 6/28/2018 11:00 AM  
ESTABLISHED PATIENT with Nury Bernal MD  
[facility] Appt Note: higinio sci BiV ICD/rc/Escobar 1 yr b   LAT  
 [address] [place] [address]  
[address] [facility] [address]  
  
    
  
 3/26/2018 10:15 AM  
REMOTE OFFICE VISIT with Jose Grant [facility] (ANTH[facility]Y SCHEDULING) Appt Note: LAT BiV ICD/rc b  
 [address] [place]  
[address] [facility] [address] Upcoming Health Maintenance Date Due Bone Densitometry (Dexa) Screening 3/21/2016 FOBT Q 1 YEAR AGE 50-75 3/16/2017 MEDICARE YEARLY EXAM 7/26/2018 BREAST CANCER SCRN MAMMOGRAM 8/22/2019 GLAUCOMA SCREENING Q2Y 9/14/2019 DTaP/Tdap/Td series (2 - Td) 3/13/2028 Allergies as of 3/13/2018  Review Complete On: 3/13/2018 By: Nuno Callaway MD  
 No Known Allergies Current Immunizations  Reviewed on 2/24/2018 Name Date Pneumococcal Conjugate (PCV-13) 7/25/2017 Pneumococcal Polysaccharide (PPSV-23) 6/2/2016 Not reviewed this visit You Were Diagnosed With   
  
 Codes Comments Chronic systolic congestive heart failure, NYHA class 3 ([facility])    -  Primary ICD-10-CM: L23.43 ICD-9-CM: 428.22, 428.0 Idiopathic cardiomyopathy ([facility]     ICD-10-CM: I42.8 ICD-9-CM: 425.4 PAF (paroxysmal atrial fibrillation) ([facility])     ICD-10-CM: I48.0 ICD-9-CM: 427.31 Essential hypertension     ICD-10-CM: I10 
ICD-9-CM: 401.9 Depression, unspecified depression type     ICD-10-CM: F32.9 ICD-9-CM: 614 Postmenopausal bone loss     ICD-10-CM: M81.0 ICD-9-CM: 733.01 Colon cancer screening     ICD-10-CM: Z12.11 ICD-9-CM: V76.51 Vitals BP Pulse Temp Resp Height(growth percentile) Weight(growth percentile) 111/72 (BP 1 Location: Left arm, BP Patient Position: Sitting) 60 97.9 °F (36.6 °C) (Oral) 16 5' 1\" (1.549 m) 163 lb (73.9 kg) SpO2 BMI OB Status Smoking Status 96% 30.8 kg/m2 Postmenopausal Never Smoker Vitals History BMI and BSA Data Body Mass Index Body Surface Area  
 30.8 kg/m 2 1.78 m 2 Preferred Pharmacy Pharmacy Name Phone Sierra Rodriguez., [address] [phone] Your Updated Medication List  
  
   
This list is accurate as of 3/13/18 10:41 AM.  Always use your most recent med list.  
  
  
  
  
 amiodarone 200 mg tablet Commonly known as:  CORDARONE Take 1 Tab by mouth daily. carvedilol 6.25 mg tablet Commonly known as:  Zia Si Take 1 Tab by mouth two (2) times daily (with meals). furosemide 20 mg tablet Commonly known as:  LASIX TAKE 1 TABLET BY MOUTH DAILY  
  
 rivaroxaban 20 mg Tab tablet Commonly known as:  Athol Bruins TAKE 1 TABLET BY MOUTH EVERY DAY  
  
 sacubitril-valsartan 24-26 mg tablet Commonly known as:  ENTRESTO Take 1 Tab by mouth two (2) times a day. sertraline 50 mg tablet Commonly known as:  ZOLOFT  
TAKE 1 TABLET BY MOUTH DAILY  
  
 spironolactone 25 mg tablet Commonly known as:  ALDACTONE Take 1 Tab by mouth daily. Indications: chronic heart failure We Performed the Following CBC WITH AUTOMATED DIFF [61284 CPT(R)] LIPID PANEL [91665 CPT(R)] METABOLIC PANEL, COMPREHENSIVE [92761 CPT(R)] OCCULT BLOOD, IMMUNOASSAY (FIT) [id number] CPT(R)] TSH 3RD GENERATION [38852 CPT(R)] Follow-up Instructions Return in about 6 months (around 9/13/2018). To-Do List   
 03/13/2018 Imaging:  DEXA BONE DENSITY STUDY AXIAL Referral Information Referral ID Referred By Referred To  
  
 [id number] [facility] PAUL Not Available Visits Status Start Date End Date 1 New Request 3/13/18 3/13/19 If your referral has a status of pending review or denied, additional information will be sent to support the outcome of this decision. Patient Instructions Colon Cancer Screening: Care Instructions Your Care Instructions Colorectal cancer occurs in the colon or rectum. That's the lower part of your digestive system. It is the second-leading cause of cancer deaths in the [place]. It often starts with small growths called polyps in the colon or rectum. Polyps are usually found with screening tests. Depending on the type of test, any polyps found may be removed during the tests. Colorectal cancer usually does not cause symptoms at first. But regular tests can help find it early, before it spreads and becomes harder to treat. Experts advise routine tests for colon cancer for people starting at age 48. And they advise people with a higher risk of colon cancer to get tested sooner. Talk with your doctor about when you should start testing. Discuss which tests you need. Follow-up care is a key part of your treatment and safety. Be sure to make and go to all appointments, and call your doctor if you are having problems. It's also a good idea to know your test results and keep a list of the medicines you take. What are the main screening tests for colon cancer? · Stool tests. These include the fecal immunochemical test (FIT) and the fecal occult blood test (FOBT). These tests check stool samples for signs of cancer. If your test is positive, you will need to have a colonoscopy. · Sigmoidoscopy. This test lets your doctor look at the lining of your rectum and the lowest part of your colon. Your doctor uses a lighted tube called a sigmoidoscope. This test can't find cancers or polyps in the upper part of your colon. In some cases, polyps that are found can be removed. But if your doctor finds polyps, you will need to have a colonoscopy to check the upper part of your colon. · Colonoscopy. This test lets your doctor look at the lining of your rectum and your entire colon. The doctor uses a thin, flexible tool called a colonoscope. It can also be used to remove polyps or get a tissue sample (biopsy). What tests do you need? The following guidelines are for people age 48 and over who are not at high risk for colorectal cancer. You may have at least one of these tests as directed by your doctor. · Fecal immunochemical test (FIT) or fecal occult blood test (FOBT) every year · Sigmoidoscopy every 5 years · Colonoscopy every 10 years If you are age 68 to 80, you can work with your doctor to decide if screening is a good option. If you are age 80 or older, your doctor will likely advise that screening is not helpful. Talk with your doctor about when you need to be tested. And discuss which tests are right for you. Your doctor may recommend earlier or more frequent testing if you: 
· Have had colorectal cancer before. · Have had colon polyps. · Have symptoms of colorectal cancer. These include blood in your stool and changes in your bowel habits. · Have a parent, brother or sister, or child with colon polyps or colorectal cancer. · Have a bowel disease. This includes ulcerative colitis and Crohn's disease. · Have a rare polyp syndrome that runs in families, such as familial adenomatous polyposis (FAP). · Have had radiation treatments to the belly or pelvis. When should you call for help? Watch closely for changes in your health, and be sure to contact your doctor if: 
? · You have any changes in your bowel habits. ? · You have any problems. Where can you learn more? Go to http://john-armin.info/. Enter M541 in the search box to learn more about \"Colon Cancer Screening: Care Instructions. \" Current as of: May 12, 2017 Content Version: 11.4 © 8094-3418 [employer]. Care instructions adapted under license by [employer] (which disclaims liability or warranty for this information). If you have questions about a medical condition or this instruction, always ask your healthcare professional. [address] any warranty or liability for your use of this information. Introducing [facility]! [facility] introduces Contract Live patient portal. Now you can access parts of your medical record, email your doctor's office, and request medication refills online. 1. In your internet browser, go to [email or web address] 2. Click on the First Time User? Click Here link in the Sign In box. You will see the New Member Sign Up page. 3. Enter your Contract Live Access Code exactly as it appears below. You will not need to use this code after youve completed the sign-up process. If you do not sign up before the expiration date, you must request a new code. · Contract Live Access Code: [email or web address] Expires: 3/20/2018 12:37 PM 
 
4. Enter the last four digits of your Social Security Number (xxxx) and Date of Birth (mm/dd/yyyy) as indicated and click Submit. You will be taken to the next sign-up page. 5. Create a Contract Live ID. This will be your Contract Live login ID and cannot be changed, so think of one that is secure and easy to remember. 6. Create a modulR password. You can change your password at any time. 7. Enter your Password Reset Question and Answer. This can be used at a later time if you forget your password. 8. Enter your e-mail address. You will receive e-mail notification when new information is available in [address] 9. Click Sign Up. You can now view and download portions of your medical record. 10. Click the Download Summary menu link to download a portable copy of your medical information. If you have questions, please visit the Frequently Asked Questions section of the modulR website. Remember, modulR is NOT to be used for urgent needs. For medical emergencies, dial 911. Now available from your iPhone and Android! Please provide this summary of care documentation to your next provider. Your primary care clinician is listed as Miriam Garcia. If you have any questions after today's visit, please call [phone].

## 2018-03-13 NOTE — PROGRESS NOTES
HPI:  78 y.o.  presents for follow up appointment. No hospital, ER or specialist visits since last primary care visit except as noted below. Was seen 2/24 for epistaxis on xarelto. Had subsequent ENT treatment and xarelto held for a few days, now back on medication and no more bleeding. Patient Active Problem List    Diagnosis    Chronic systolic congestive heart failure, NYHA class 3 (HCC)    Biventricular ICD (implantable cardioverter-defibrillator) in place     Upgrade from ICD Bouton Btarget  Right atrial lead extraction and reimplant 6/21/2016  RA lead dislodgment and replaced 2/22/2017      Left bundle branch block (LBBB) on electrocardiogram    Depression - well controlled on current medications. No AVH. No SI.    Hypertension - No home monitoring. Compliant with medication. No shortness of breath, no chest pain, no vision change, no headache, no lower extremity edema.  History of amiodarone therapy    Idiopathic cardiomyopathy ([address]     Non ischemic by cath 3/15. EF 21% 11/16 by echo  2/6/17 Stress test showed appropriate sinus response but she had PVCs and 4 beat NSVT.  Automatic implantable cardiac defibrillator in situ     ICD DFT test at 26 J converted VF to NSR successfully (she is on Amiodarone) 5/13/2011  Lead noises with isometric arm activity.   Fluoroscopy 7/30/2012 does not show fracture of disconnection      Paroxysmal ventricular tachycardia (HCC)    PAF (paroxysmal atrial fibrillation) ([address]     ICD shock 3/28/2011 for rapid Afib  ICD shock 6/3/2010  (ICD with ATP for VT in the past)           Past Medical History:   Diagnosis Date    Atrial fibrillation ([address]     Cardiomyopathy, nonischemic (HCC)     Congestive heart failure, unspecified     Depression     History of amiodarone therapy 12/30/2011    ICD (implantable cardiac defibrillator) in place 9/18/09    [facility] [place]       Social History   Substance Use Topics    Smoking status: Never Smoker    Smokeless tobacco: Never Used    Alcohol use 2.5 oz/week     5 Cans of beer per week      Comment: occasional       Outpatient Prescriptions Marked as Taking for the 3/13/18 encounter (Office Visit) with Isis Reeves MD   Medication Sig Dispense Refill    spironolactone (ALDACTONE) 25 mg tablet Take 1 Tab by mouth daily. Indications: chronic heart failure 90 Tab 1    amiodarone (CORDARONE) 200 mg tablet Take 1 Tab by mouth daily. 90 Tab 1    carvedilol (COREG) 6.25 mg tablet Take 1 Tab by mouth two (2) times daily (with meals). 180 Tab 1    furosemide (LASIX) 20 mg tablet TAKE 1 TABLET BY MOUTH DAILY 90 Tab 1    sacubitril-valsartan (ENTRESTO) 24 mg/26 mg tablet Take 1 Tab by mouth two (2) times a day. 180 Tab 1    sertraline (ZOLOFT) 50 mg tablet TAKE 1 TABLET BY MOUTH DAILY 90 Tab 4    rivaroxaban (XARELTO) 20 mg tab tablet TAKE 1 TABLET BY MOUTH EVERY DAY 90 Tab 3       No Known Allergies    ROS:  ROS negative except as per HPI. PE:  Visit Vitals    /72 (BP 1 Location: Left arm, BP Patient Position: Sitting)    Pulse 60    Temp 97.9 °F (36.6 °C) (Oral)    Resp 16    Ht 5' 1\" (1.549 m)    Wt 163 lb (73.9 kg)    SpO2 96%    BMI 30.8 kg/m2     Gen: alert, oriented, no acute distress  Head: normocephalic, atraumatic  Ears: external auditory canals clear, TMs without erythema or effusion  Eyes: pupils equal round reactive to light, sclera clear, conjunctiva clear  Oral: moist mucus membranes, no oral lesions, no pharyngeal inflammation or exudate  Neck: symmetric normal sized thyroid, no carotid bruits, no jugular vein distention  Resp: no increase work of breathing, lungs clear to ausculation bilaterally, no wheezing, rales or rhonchi  CV: S1, S2 normal.  No murmurs, rubs, or gallops. Abd: soft, not tender, not distended. No hepatosplenomegaly. Normal bowel sounds.     Neuro: cranial nerves intact, normal strength and movement in all extremities, reflexes and sensation intact and symmetric. Psych: speaks with a slow rate and fluency, no tangential thoughts, normal affect, denies auditory or visual hallucinations, denies suicidal thoughts. Skin: no lesion or rash  Extremities: no cyanosis or edema      Assessment/Plan:    1. Chronic systolic congestive heart failure, NYHA class 3 ([facility]  Treatment per cardiology. No signs of decompensation    2. Idiopathic cardiomyopathy ([facility]  Treatment per cardioloty    3. PAF (paroxysmal atrial fibrillation) ([facility]  Treatment per cardiology    4. Essential hypertension  At goal.  Continue current medications. - CBC WITH AUTOMATED DIFF  - METABOLIC PANEL, COMPREHENSIVE  - LIPID PANEL  - TSH 3RD GENERATION    5. Depression, unspecified depression type  Well controlled on current meds    6. Postmenopausal bone loss  - DEXA BONE DENSITY STUDY AXIAL; Future    7. Colon cancer screening  - OCCULT BLOOD, IMMUNOASSAY (FIT)      Health Maintenance reviewed - updated. Orders Placed This Encounter    DEXA BONE DENSITY STUDY AXIAL     Standing Status:   Future     Standing Expiration Date:   4/13/2019     Order Specific Question:   Reason for Exam     Answer:   screen for osteoporosis    CBC WITH AUTOMATED DIFF    METABOLIC PANEL, COMPREHENSIVE    LIPID PANEL    TSH 3RD GENERATION    OCCULT BLOOD, IMMUNOASSAY (FIT)       There are no discontinued medications. Current Outpatient Prescriptions   Medication Sig Dispense Refill    spironolactone (ALDACTONE) 25 mg tablet Take 1 Tab by mouth daily. Indications: chronic heart failure 90 Tab 1    amiodarone (CORDARONE) 200 mg tablet Take 1 Tab by mouth daily. 90 Tab 1    carvedilol (COREG) 6.25 mg tablet Take 1 Tab by mouth two (2) times daily (with meals). 180 Tab 1    furosemide (LASIX) 20 mg tablet TAKE 1 TABLET BY MOUTH DAILY 90 Tab 1    sacubitril-valsartan (ENTRESTO) 24 mg/26 mg tablet Take 1 Tab by mouth two (2) times a day.  180 Tab 1    sertraline (ZOLOFT) 50 mg tablet TAKE 1 TABLET BY MOUTH DAILY 90 Tab 4    rivaroxaban (XARELTO) 20 mg tab tablet TAKE 1 TABLET BY MOUTH EVERY DAY 90 Tab 3       Recommended healthy diet low in carbohydrates, fats, sodium and cholesterol. Recommended regular cardiovascular exercise 3-6 times per week for 30-60 minutes daily. Verbal and written instructions (see AVS) provided. Patient expresses understanding of diagnosis and treatment plan.

## 2018-03-13 NOTE — PATIENT INSTRUCTIONS
Colon Cancer Screening: Care Instructions  Your Care Instructions    Colorectal cancer occurs in the colon or rectum. That's the lower part of your digestive system. It is the second-leading cause of cancer deaths in the [place]. It often starts with small growths called polyps in the colon or rectum. Polyps are usually found with screening tests. Depending on the type of test, any polyps found may be removed during the tests. Colorectal cancer usually does not cause symptoms at first. But regular tests can help find it early, before it spreads and becomes harder to treat. Experts advise routine tests for colon cancer for people starting at age 48. And they advise people with a higher risk of colon cancer to get tested sooner. Talk with your doctor about when you should start testing. Discuss which tests you need. Follow-up care is a key part of your treatment and safety. Be sure to make and go to all appointments, and call your doctor if you are having problems. It's also a good idea to know your test results and keep a list of the medicines you take. What are the main screening tests for colon cancer? · Stool tests. These include the fecal immunochemical test (FIT) and the fecal occult blood test (FOBT). These tests check stool samples for signs of cancer. If your test is positive, you will need to have a colonoscopy. · Sigmoidoscopy. This test lets your doctor look at the lining of your rectum and the lowest part of your colon. Your doctor uses a lighted tube called a sigmoidoscope. This test can't find cancers or polyps in the upper part of your colon. In some cases, polyps that are found can be removed. But if your doctor finds polyps, you will need to have a colonoscopy to check the upper part of your colon. · Colonoscopy. This test lets your doctor look at the lining of your rectum and your entire colon. The doctor uses a thin, flexible tool called a colonoscope.  It can also be used to remove polyps or get a tissue sample (biopsy). What tests do you need? The following guidelines are for people age 48 and over who are not at high risk for colorectal cancer. You may have at least one of these tests as directed by your doctor. · Fecal immunochemical test (FIT) or fecal occult blood test (FOBT) every year  · Sigmoidoscopy every 5 years  · Colonoscopy every 10 years  If you are age 68 to 80, you can work with your doctor to decide if screening is a good option. If you are age 80 or older, your doctor will likely advise that screening is not helpful. Talk with your doctor about when you need to be tested. And discuss which tests are right for you. Your doctor may recommend earlier or more frequent testing if you:  · Have had colorectal cancer before. · Have had colon polyps. · Have symptoms of colorectal cancer. These include blood in your stool and changes in your bowel habits. · Have a parent, brother or sister, or child with colon polyps or colorectal cancer. · Have a bowel disease. This includes ulcerative colitis and Crohn's disease. · Have a rare polyp syndrome that runs in families, such as familial adenomatous polyposis (FAP). · Have had radiation treatments to the belly or pelvis. When should you call for help? Watch closely for changes in your health, and be sure to contact your doctor if:  ? · You have any changes in your bowel habits. ? · You have any problems. Where can you learn more? Go to http://john-armin.info/. Enter M541 in the search box to learn more about \"Colon Cancer Screening: Care Instructions. \"  Current as of: May 12, 2017  Content Version: 11.4  © 3551-4629 [employer]. Care instructions adapted under license by [employer] (which disclaims liability or warranty for this information).  If you have questions about a medical condition or this instruction, always ask your healthcare professional. Verneita Riding disclaims any warranty or liability for your use of this information.

## 2018-03-16 NOTE — PROGRESS NOTES
Labs look stable except mild change in kidneys which may be because you were fasting. No change in medications. Repeat in 3 months.

## 2018-03-19 NOTE — PROGRESS NOTES
Contacted pt and verified name and . Informed pt of results. Pt verbalized understanding. No questions at this time.

## 2018-04-02 NOTE — TELEPHONE ENCOUNTER
Verified patient with two types of identifiers. Notified patient that a prescription was sent to her pharmacy on 2/23/18 for a 90 day supply with 1 refill. Advised patient to call her pharmacy. Patient states that her  called the pharmacy and they stated they did not have any refills. I called and left message with pharmacy requesting return call. Spoke with Bao Van, at [facility], who states they are getting patient's medication ready right now and will text patient when ready for . Called and notified patient of above conversation. Patient verbalized understanding and will call with any other questions.

## 2018-04-17 NOTE — PROGRESS NOTES
Patient has osteoporosis. She is at high risk for fractures of the hip and spine. She should start a medication to increase her bone density - alendronate 70mg weekly. Also be sure to take 600mg calcium BID and at least 1000U vitamin D daily. Will repeat the scan in 2 years.

## 2018-04-18 NOTE — PROGRESS NOTES
Mr and Ms Aliec Stauffer notified Dr. Tenzin Ag said Patient has osteoporosis. She is at high risk for fractures of the hip and spine. She should start a medication to increase her bone density - alendronate 70mg weekly. Also be sure to take 600mg calcium BID and at least 1000U vitamin D daily. Will repeat the scan in 2 years. They both voiced understanding of instructions, will get the Alendronate calcium and vitamin D and voiced understanding of how to take medications.

## 2018-04-25 NOTE — TELEPHONE ENCOUNTER
Patient's  calling in med. He says that the pharmacy has contacted us 4 times about this, I told him I did not have anything in the chart about this. He says they need it today because it's her blood thinner and she is out.

## 2018-04-25 NOTE — TELEPHONE ENCOUNTER
Per [facility] it was sent to [facility] pharmacy on 4/20. Is that not the correct pharmacy?

## 2018-05-14 NOTE — TELEPHONE ENCOUNTER
Verified patient with two types of identifiers. Notified patient that she should still have refills on her prescription at the pharmacy. Patient states that her  called and they said there were no refills. Patient to call and confirm. Patient to call back with any issues. Patient verbalized understanding and will call with any other questions.

## 2018-05-14 NOTE — TELEPHONE ENCOUNTER
Pharmacy verified. Requested Prescriptions     Pending Prescriptions Disp Refills    sacubitril-valsartan (ENTRESTO) 24 mg/26 mg tablet 180 Tab 1     Sig: Take 1 Tab by mouth two (2) times a day. Thanks!

## 2018-05-29 NOTE — PROGRESS NOTES
Subjective:     Blu Cruz is a 80 y.o. female who presents today with the following:  Chief Complaint   Patient presents with    Abdominal Pain     pain and gas x 2 weeks     Patient presents today with about a month of abdominal pain and gas in the central part of her stomach. Patient has been having diarrhea although this is chronic in nature, has continued to have regular bowel movements daily. Feels her gas production is increased. Yesterday she ate some soft shell crabs for dinner and soon after she had cramps and gas. Patient has tried tums, but has not taken anything else for fear of medication interactions. Denies blood in stools. Denies vomiting, but is coughing up thick phlegm often. Denies nausea. Wt Readings from Last 3 Encounters:   05/29/18 156 lb (70.8 kg)   03/13/18 163 lb (73.9 kg)   02/24/18 159 lb 2.8 oz (72.2 kg)     ROS:  Gen: denies fever, chills, fatigue, weight loss, weight gain  HEENT:denies blurry vision, nasal congestion, sore throat  Resp: denies dypsnea, cough, wheezing  CV: denies chest pain, palpitations, lower extremity edema  Abd: denies nausea, vomiting, diarrhea, constipation  Neuro: denies numbness/tingling  Endo: denies polyuria, polydipsia, heat/cold intolerance    No Known Allergies      Current Outpatient Prescriptions:     Calcium-Cholecalciferol, D3, (CALCIUM 600 WITH VITAMIN D3) 600 mg(1,500mg) -400 unit cap, Take  by mouth., Disp: , Rfl:     alendronate (FOSAMAX) 70 mg tablet, Take 1 Tab by mouth every seven (7) days. , Disp: 12 Tab, Rfl: 3    spironolactone (ALDACTONE) 25 mg tablet, Take 1 Tab by mouth daily. Indications: chronic heart failure, Disp: 90 Tab, Rfl: 1    amiodarone (CORDARONE) 200 mg tablet, Take 1 Tab by mouth daily. , Disp: 90 Tab, Rfl: 1    carvedilol (COREG) 6.25 mg tablet, Take 1 Tab by mouth two (2) times daily (with meals). , Disp: 180 Tab, Rfl: 1    furosemide (LASIX) 20 mg tablet, TAKE 1 TABLET BY MOUTH DAILY, Disp: 90 Tab, Rfl: 1    sacubitril-valsartan (ENTRESTO) 24 mg/26 mg tablet, Take 1 Tab by mouth two (2) times a day., Disp: 180 Tab, Rfl: 1    sertraline (ZOLOFT) 50 mg tablet, TAKE 1 TABLET BY MOUTH DAILY, Disp: 90 Tab, Rfl: 4    rivaroxaban (XARELTO) 20 mg tab tablet, TAKE 1 TABLET BY MOUTH EVERY DAY, Disp: 90 Tab, Rfl: 3    Past Medical History:   Diagnosis Date    Atrial fibrillation (HCC)     Cardiomyopathy, nonischemic (HCC)     Congestive heart failure, unspecified     Depression     History of amiodarone therapy 12/30/2011    ICD (implantable cardiac defibrillator) in place 9/18/09    [facility]       Past Surgical History:   Procedure Laterality Date    ECHO 2D ADULT  4/1/2011    EF 30%, mild to moderate LA enlargement, mild MR    HX HEART CATHETERIZATION  2006    no CAD, EF 25%    HX PACEMAKER      HX PACEMAKER PLACEMENT  2009    IR FLUOROSCOPY < 1 HOUR  7/30/2012         DEBBI US BX BREAST LT 1ST LESION W/CLIP AND SPECIMEN Left     2015--neg    [place] REMV TRANSVEN PACER ELECTRODE,1 LEAD  6/21/2016            History   Smoking Status    Never Smoker   Smokeless Tobacco    Never Used       Social History     Social History    Marital status:      Spouse name: N/A    Number of children: N/A    Years of education: N/A     Social History Main Topics    Smoking status: Never Smoker    Smokeless tobacco: Never Used    Alcohol use 2.5 oz/week     5 Cans of beer per week      Comment: occasional    Drug use: No    Sexual activity: Not Asked     Other Topics Concern    None     Social History Narrative    Lives with        Family History   Problem Relation Age of Onset    High Cholesterol Mother          Objective:     Visit Vitals    BP 95/61 (BP 1 Location: Right arm, BP Patient Position: Sitting)    Pulse 60    Temp 98.2 °F (36.8 °C) (Oral)    Resp 18    Ht 5' 1\" (1.549 m)    Wt 156 lb (70.8 kg)    SpO2 98%    BMI 29.48 kg/m2     Gen: alert, oriented, no acute distress  Head: normocephalic, atraumatic  Eyes:sclera clear, conjunctiva clear  Oral: moist mucus membranes, no oral lesions, no pharyngeal exudate, no pharyngeal erythema  Neck: symmetric normal sized thyroid, no carotid bruits, no JVD  Resp: Normal work of breathing, lungs CTAB, no w/r/r  CV: S1, S2 normal.  No murmurs, rubs, or gallops. Abd:  Normal bowel sounds. Soft, not tender, moderately distended. No guarding, no rebound. Negative Gutierrez's sign  Skin: no rash             Extremities: no edema    No results found for this visit on 05/29/18. Assessment/ Plan:   Diagnoses and all orders for this visit:    1. Abdominal distension (gaseous)   -advised patient to increase fiber intake to promote more regular and solid stool. This may also help with distension. Follow up 4 weeks if no improvement in symptoms. Verbal and written instructions (see AVS) provided.  Patient expresses understanding of diagnosis and treatment plan. Elida Parker.  Shyam Lucero MD

## 2018-05-29 NOTE — PROGRESS NOTES
Chief Complaint   Patient presents with    Abdominal Pain     pain and gas x 2 weeks       1. Have you been to the ER, urgent care clinic since your last visit? Hospitalized since your last visit? no    2. Have you seen or consulted any other health care providers outside of the [facility] since your last visit? Include any pap smears or colon screening.   no

## 2018-06-18 PROBLEM — K86.89 PANCREATIC MASS: Status: ACTIVE | Noted: 2018-01-01

## 2018-06-18 NOTE — ED NOTES
Pt presents to ED for abdominal pain and diarrhea x week and intermittent hypotension. Denies blood in stool, dysuria, nausea or vomiting. She reports the pain is mostly located in her right lower quadrant.

## 2018-06-18 NOTE — H&P
Hospitalist Admission Note    NAME: Blu Cruz   :  1950   MRN:  [id number]     Date/Time:  2018 6:55 PM    Patient PCP: Greyson Ríos MD  ______________________________________________________________________  Given the patient's current clinical presentation, I have a high level of concern for decompensation if discharged from the emergency department. Complex decision making was performed, which includes reviewing the patient's available past medical records, laboratory results, and x-ray films. My assessment of this patient's clinical condition and my plan of care is as follows. Assessment / Plan:  Abd pain  Pancreatitis  Pancreatic mass  Hypotension  -CT a/p showed Low-attenuation infiltrative mass in the head of the pancreas with  involvement of the superior mesenteric vessels and occlusion of the superior  mesenteric vein consistent with pancreatic carcinoma. Peripancreatic adenopathy   -diarrhea had been on going for several years. Her  states it started right after Xarelto was started. -pain control with IV morphine/norco prn  -gentle hydration due chronic heart failure with EF 20-25%  -NPO and plan for EUS in the AM  -Xarelto discontinued for 2 days now due to worsening of diarrhea  -will send stool studies  -Discussed case with Dr Maxx Garcia    NICM with EF 20-25% s/p biV pacemaker  Paroxysmal afib  -hold antihypertensives due to hypotension  -cont' amiodarone  -hydralazine prn  -xarelto held for 2 days now per pt. I confirmed it with her       Code Status: Full  Surrogate Decision Maker: pt's   DVT Prophylaxis: scds  GI Prophylaxis: not indicated      Subjective:   CHIEF COMPLAINT:     HISTORY OF PRESENT ILLNESS:     Kayla Mederos is a 80 y.o.  female with PMHx significant for NICM with EF 20-25%, s/p bIV pacemaker, PAF, present to the ED from PCP for further evaluation of hypotension.   Pt was at her PCP's office today for evaluation RLQ and epigastric abd pain with worsening of chronic diarrhea. She was found hypotensive there and was transferred to the ED for further evaluation. Pt states her diarrhea started several years ago at around the same time xarelto was started. She denies any past evaluation by GI. Associated symptoms including wt loss over the last few months. Pt denies any associated fever, chills, cp, palpitations, n/v. In the ER, vitals: T98.1, P 63, /71, SpO2 100% on RA  Labs: lipase 1443  CT a/p showed Low-attenuation infiltrative mass in the head of the pancreas with  involvement of the superior mesenteric vessels and occlusion of the superior  mesenteric vein consistent with pancreatic carcinoma. Peripancreatic adenopathy    Past Medical History:   Diagnosis Date    Atrial fibrillation ([address]     Cardiomyopathy, nonischemic (HCC)     Congestive heart failure, unspecified     Depression     History of amiodarone therapy 12/30/2011    ICD (implantable cardiac defibrillator) in place 9/18/09    [facility]        Past Surgical History:   Procedure Laterality Date    ECHO 2D ADULT  4/1/2011    EF 30%, mild to moderate LA enlargement, mild MR    HX HEART CATHETERIZATION  2006    no CAD, EF 25%    HX PACEMAKER      HX PACEMAKER PLACEMENT  2009    IR FLUOROSCOPY < 1 HOUR  7/30/2012         DEBBI US BX BREAST LT 1ST LESION W/CLIP AND SPECIMEN Left     2015--neg    [place] REMV TRANSVEN PACER ELECTRODE,1 LEAD  6/21/2016            Social History   Substance Use Topics    Smoking status: Never Smoker    Smokeless tobacco: Never Used    Alcohol use 2.5 oz/week     5 Cans of beer per week      Comment: occasional        Family History   Problem Relation Age of Onset    High Cholesterol Mother      No Known Allergies     Prior to Admission medications    Medication Sig Start Date End Date Taking?  Authorizing Provider   Calcium-Cholecalciferol, D3, (CALCIUM 600 WITH VITAMIN D3) 600 mg(1,500mg) -400 unit cap Take 1 Tab by mouth daily. Yes Historical Provider   alendronate (FOSAMAX) 70 mg tablet Take 1 Tab by mouth every seven (7) days. 4/18/18  Yes Avel Gardner MD   spironolactone (ALDACTONE) 25 mg tablet Take 1 Tab by mouth daily. Indications: chronic heart failure 2/23/18  Yes Timoteo Yates MD   amiodarone (CORDARONE) 200 mg tablet Take 1 Tab by mouth daily. 2/23/18  Yes Timoteo Yates MD   carvedilol (COREG) 6.25 mg tablet Take 1 Tab by mouth two (2) times daily (with meals). 2/23/18  Yes Timoteo Yates MD   furosemide (LASIX) 20 mg tablet TAKE 1 TABLET BY MOUTH DAILY 2/23/18  Yes Timoteo Yates MD   sacubitril-valsartan (ENTRESTO) 24 mg/26 mg tablet Take 1 Tab by mouth two (2) times a day. 2/23/18  Yes Timoteo Yates MD   sertraline (ZOLOFT) 50 mg tablet TAKE 1 TABLET BY MOUTH DAILY 10/16/17  Yes Avel Gardner MD   rivaroxaban (XARELTO) 20 mg tab tablet TAKE 1 TABLET BY MOUTH EVERY DAY 4/20/17  Yes Avel Gardner MD       REVIEW OF SYSTEMS:     I am not able to complete the review of systems because:    The patient is intubated and sedated    The patient has altered mental status due to his acute medical problems    The patient has baseline aphasia from prior stroke(s)    The patient has baseline dementia and is not reliable historian    The patient is in acute medical distress and unable to provide information           Total of 12 systems reviewed as follows:       POSITIVE= BOLD text  Negative = text not BOLD  General:  fever, chills, sweats, generalized weakness, weight loss,  loss of appetite   Eyes:    blurred vision, eye pain, loss of vision, double vision  ENT:    rhinorrhea, pharyngitis   Respiratory:   cough, sputum production, SOB, YANEZ, wheezing, pleuritic pain   Cardiology:   chest pain, palpitations, orthopnea, PND, edema, syncope   Gastrointestinal:  abdominal pain , N/V, diarrhea, dysphagia, constipation, bleeding   Genitourinary:  frequency, urgency, dysuria, hematuria, incontinence   Muskuloskeletal :  arthralgia, myalgia, back pain  Hematology:  easy bruising, nose or gum bleeding, lymphadenopathy   Dermatological: rash, ulceration, pruritis, color change / jaundice  Endocrine:   hot flashes or polydipsia   Neurological:  headache, dizziness, confusion, focal weakness, paresthesia,     Speech difficulties, memory loss, gait difficulty  Psychological: Feelings of anxiety, depression, agitation    Objective:   VITALS:    Visit Vitals    /76    Pulse 60    Temp 98.1 °F (36.7 °C)    Resp 16    Ht 5' 1\" (1.549 m)    Wt 70.8 kg (156 lb)    SpO2 97%    BMI 29.48 kg/m2       PHYSICAL EXAM:    General:    Alert, cooperative, no distress, appears stated age. HEENT: Atraumatic, anicteric sclerae, pink conjunctivae     No oral ulcers, mucosa moist, throat clear  Neck:  Supple, symmetrical,  thyroid: non tender  Lungs:   CTA b/l. No wheezing or Rhonchi. No rales. Chest wall:  PPM on L chest  No accessory muscle use. Heart:   Regular  rhythm,  No  Murmur. No edema  Abdomen:   Distended, TTP RLQ, +BSExtremities: No cyanosis. No clubbing,      Skin turgor normal, Radial dial pulse 2+  Skin:     Not pale. Not Jaundiced  No rashes   Psych:  Not anxious or agitated. Neurologic: No facial asymmetry. No aphasia or slurred speech. Symmetrical strength, Sensation grossly intact.  AAOx4.     _______________________________________________________________________  Care Plan discussed with:    Comments   Patient x    Family  x    RN x    Care Manager                    Consultant:  x ED physician   _______________________________________________________________________  Expected  Disposition:   Home with Family x   HH/PT/OT/RN    SNF/LTC    JAIME    ________________________________________________________________________  TOTAL TIME:  72 Minutes    Critical Care Provided     Minutes non procedure based      Comments    x Reviewed previous records   >50% of visit spent in counseling and coordination of care x Discussion with patient and/or family and questions answered       ________________________________________________________________________  Signed: Liana Briggs MD    Procedures: see electronic medical records for all procedures/Xrays and details which were not copied into this note but were reviewed prior to creation of Plan. LAB DATA REVIEWED:    Recent Results (from the past 24 hour(s))   CBC WITH AUTOMATED DIFF    Collection Time: 06/18/18 12:49 PM   Result Value Ref Range    WBC 6.5 3.6 - 11.0 K/uL    RBC 3.53 (L) 3.80 - 5.20 M/uL    HGB 11.7 11.5 - 16.0 g/dL    HCT 35.3 35.0 - 47.0 %    .0 (H) 80.0 - 99.0 FL    MCH 33.1 26.0 - 34.0 PG    MCHC 33.1 30.0 - 36.5 g/dL    RDW 12.3 11.5 - 14.5 %    PLATELET 298 989 - 297 K/uL    MPV 9.2 8.9 - 12.9 FL    NRBC 0.0 0  WBC    ABSOLUTE NRBC 0.00 0.00 - 0.01 K/uL    NEUTROPHILS 55 32 - 75 %    LYMPHOCYTES 33 12 - 49 %    MONOCYTES 10 5 - 13 %    EOSINOPHILS 1 0 - 7 %    BASOPHILS 1 0 - 1 %    IMMATURE GRANULOCYTES 0 0.0 - 0.5 %    ABS. NEUTROPHILS 3.6 1.8 - 8.0 K/UL    ABS. LYMPHOCYTES 2.2 0.8 - 3.5 K/UL    ABS. MONOCYTES 0.6 0.0 - 1.0 K/UL    ABS. EOSINOPHILS 0.1 0.0 - 0.4 K/UL    ABS. BASOPHILS 0.0 0.0 - 0.1 K/UL    ABS. IMM. GRANS. 0.0 0.00 - 0.04 K/UL    DF AUTOMATED     METABOLIC PANEL, COMPREHENSIVE    Collection Time: 06/18/18 12:49 PM   Result Value Ref Range    Sodium 142 136 - 145 mmol/L    Potassium 3.9 3.5 - 5.1 mmol/L    Chloride 106 97 - 108 mmol/L    CO2 26 21 - 32 mmol/L    Anion gap 10 5 - 15 mmol/L    Glucose 119 (H) 65 - 100 mg/dL    BUN 14 6 - 20 MG/DL    Creatinine 0.88 0.55 - 1.02 MG/DL    BUN/Creatinine ratio 16 12 - 20      GFR est AA >60 >60 ml/min/1.73m2    GFR est non-AA >60 >60 ml/min/1.73m2    Calcium 9.5 8.5 - 10.1 MG/DL    Bilirubin, total 0.6 0.2 - 1.0 MG/DL    ALT (SGPT) 21 12 - 78 U/L    AST (SGOT) 16 15 - 37 U/L    Alk.  phosphatase 73 45 - 117 U/L    Protein, total 7.5 6.4 - 8.2 g/dL    Albumin 3.7 3.5 - 5.0 g/dL    Globulin 3.8 2.0 - 4.0 g/dL    A-G Ratio 1.0 (L) 1.1 - 2.2     LIPASE    Collection Time: 06/18/18 12:49 PM   Result Value Ref Range    Lipase 1443 (H) 73 - 393 U/L   NT-PRO BNP    Collection Time: 06/18/18 12:49 PM   Result Value Ref Range    NT pro- 0 - 125 PG/ML   URINALYSIS W/ REFLEX CULTURE    Collection Time: 06/18/18  1:26 PM   Result Value Ref Range    Color YELLOW/STRAW      Appearance CLEAR CLEAR      Specific gravity 1.009 1.003 - 1.030      pH (UA) 7.0 5.0 - 8.0      Protein NEGATIVE  NEG mg/dL    Glucose NEGATIVE  NEG mg/dL    Ketone NEGATIVE  NEG mg/dL    Bilirubin NEGATIVE  NEG      Blood NEGATIVE  NEG      Urobilinogen 1.0 0.2 - 1.0 EU/dL    Nitrites NEGATIVE  NEG      Leukocyte Esterase NEGATIVE  NEG      WBC 0-4 0 - 4 /hpf    RBC 0-5 0 - 5 /hpf    Epithelial cells FEW FEW /lpf    Bacteria 1+ (A) NEG /hpf    UA:UC IF INDICATED URINE CULTURE ORDERED (A) CNI      Hyaline cast 0-2 0 - 5 /lpf   EKG, 12 LEAD, INITIAL    Collection Time: 06/18/18  2:31 PM   Result Value Ref Range    Ventricular Rate 60 BPM    Atrial Rate 38 BPM    P-R Interval 226 ms    QRS Duration 150 ms    Q-T Interval 512 ms    QTC Calculation (Bezet) 512 ms    Calculated P Axis -17 degrees    Calculated R Axis -133 degrees    Calculated T Axis 55 degrees    Diagnosis       ** Poor data quality, interpretation may be adversely affected  Recommend repeat  AV dual-paced rhythm with prolonged AV conduction  Biventricular pacemaker detected  When compared with ECG of 21-JUN-2016 19:17,  Electronic ventricular pacemaker has replaced Sinus rhythm  Confirmed by Alfonso Kaplan MD, Mikael Alejandro ([id number]) on 6/18/2018 3:37:16 PM

## 2018-06-18 NOTE — ED NOTES
TRANSFER - OUT REPORT:    Verbal report given to [facility] on Zee Rosenbaum  being transferred to [facility] for routine progression of care       Report consisted of patients Situation, Background, Assessment and   Recommendations(SBAR). Information from the following report(s) SBAR, ED Summary, MAR, Recent Results and Cardiac Rhythm Paced was reviewed with the receiving nurse. Lines:   Peripheral IV 06/18/18 Right Antecubital (Active)        Opportunity for questions and clarification was provided.       Patient transported with:   [employer]

## 2018-06-18 NOTE — ED NOTES
Bedside and Verbal shift change report given to Chevy Brice (oncoming nurse) by Annika Mcleod RN (offgoing nurse). Report included the following information SBAR and ED Summary.

## 2018-06-18 NOTE — PROGRESS NOTES
42-year-old with history of nonischemic cardiomyopathy, last ejection fraction 21%. Has an implanted ICD. Presents today with low blood pressure and abdominal pain. She states she has had diarrhea for over a week. She denies any blood in the stool. She states the abdominal pain is worsening each day, she was unable to do her regular activities yesterday due to the pain. She last ate at breakfast time this morning and immediately had to run to the bathroom with diarrhea. She indicates the pain is right lower quadrant. She is tearful given her history. She denies any vomiting. She denies any dysuria. She has no history of pancreatitis and has only had one beer over the weekend. Past Medical History, Surgical History, and Social History reviewed. Current Outpatient Prescriptions   Medication Sig Dispense Refill    Calcium-Cholecalciferol, D3, (CALCIUM 600 WITH VITAMIN D3) 600 mg(1,500mg) -400 unit cap Take  by mouth.  alendronate (FOSAMAX) 70 mg tablet Take 1 Tab by mouth every seven (7) days. 12 Tab 3    spironolactone (ALDACTONE) 25 mg tablet Take 1 Tab by mouth daily. Indications: chronic heart failure 90 Tab 1    amiodarone (CORDARONE) 200 mg tablet Take 1 Tab by mouth daily. 90 Tab 1    carvedilol (COREG) 6.25 mg tablet Take 1 Tab by mouth two (2) times daily (with meals). 180 Tab 1    furosemide (LASIX) 20 mg tablet TAKE 1 TABLET BY MOUTH DAILY 90 Tab 1    sacubitril-valsartan (ENTRESTO) 24 mg/26 mg tablet Take 1 Tab by mouth two (2) times a day. 180 Tab 1    sertraline (ZOLOFT) 50 mg tablet TAKE 1 TABLET BY MOUTH DAILY 90 Tab 4    rivaroxaban (XARELTO) 20 mg tab tablet TAKE 1 TABLET BY MOUTH EVERY DAY 90 Tab 3     No Known Allergies    ROS negative except as per HPI.     Visit Vitals    BP 64/36, 80/40, 90/50    Pulse 60    Temp 98.3 °F (36.8 °C) (Oral)    Resp 18    Ht 5' 1\" (1.549 m)    Wt 156 lb (70.8 kg)    SpO2 95%    BMI 29.48 kg/m2     Visit Vitals    BP 90/50    Pulse 60    Temp 98.3 °F (36.8 °C) (Oral)    Resp 18    Ht 5' 1\" (1.549 m)    Wt 156 lb (70.8 kg)    SpO2 95%    BMI 29.48 kg/m2     Gen: alert, oriented,tearful and in pain  Eyes: pupils equal round reactive to light, sclera clear, conjunctiva clear  Oral: moist mucus membranes, no oral lesions, no pharyngeal inflammation or exudate  Resp: no increase work of breathing, lungs clear to ausculation bilaterally, no wheezing, rales or rhonchi  CV: S1, S2 normal. No murmurs, rubs, or gallops. Abd: Hyperactive bowel sounds, generalized tenderness and guarding, rebound tenderness  Neuro: cranial nerves intact, normal strength and movement in all extremities, sensation intact and symmetric. Extremities: Chronic 1+ edema    EKG with paced rhythm    Assessment/Plan:    Concern for acute abdomen causing hypotension. Versus CHF or vascular problem. Transferred to the emergency room via EMS.

## 2018-06-18 NOTE — PROGRESS NOTES
Pharmacy Clarification of Prior to Admission Medication Regimen     The patient was interviewed regarding clarification of the prior to admission medication regimen. Patient's  was present in room and obtained permission from patient to discuss drug regimen with visitor(s) present. Patient was questioned regarding use of any other inhalers, topical products, over the counter medications, herbal medications, vitamin products or ophthalmic/nasal/otic medication use. Information Obtained From: Rx Query and patient    Pertinent Pharmacy Findings:   Identified High Alert Medication Information  o Current Anticoagulants:  - Name: rivaroxaban (XARELTO) 20 mg tab tablet      PTA medication list was corrected to the following:     Prior to Admission Medications   Prescriptions Last Dose Informant Patient Reported? Taking? Calcium-Cholecalciferol, D3, (CALCIUM 600 WITH VITAMIN D3) 600 mg(1,500mg) -400 unit cap 6/18/2018 at Unknown time Self Yes Yes   Sig: Take 1 Tab by mouth daily. alendronate (FOSAMAX) 70 mg tablet 6/13/2018 at Unknown time Self No Yes   Sig: Take 1 Tab by mouth every seven (7) days. amiodarone (CORDARONE) 200 mg tablet 6/18/2018 at Unknown time Self No Yes   Sig: Take 1 Tab by mouth daily. carvedilol (COREG) 6.25 mg tablet 6/18/2018 at Unknown time Self No Yes   Sig: Take 1 Tab by mouth two (2) times daily (with meals). furosemide (LASIX) 20 mg tablet 6/18/2018 at Unknown time Self No Yes   Sig: TAKE 1 TABLET BY MOUTH DAILY   rivaroxaban (XARELTO) 20 mg tab tablet 6/16/2018 at Unknown time Self No Yes   Sig: TAKE 1 TABLET BY MOUTH EVERY DAY   sacubitril-valsartan (ENTRESTO) 24 mg/26 mg tablet 6/18/2018 at Unknown time Self No Yes   Sig: Take 1 Tab by mouth two (2) times a day.    sertraline (ZOLOFT) 50 mg tablet 6/17/2018 at Unknown time Self No Yes   Sig: TAKE 1 TABLET BY MOUTH DAILY   spironolactone (ALDACTONE) 25 mg tablet 6/18/2018 at Unknown time Self No Yes   Sig: Take 1 Tab by mouth daily. Indications: chronic heart failure      Facility-Administered Medications: None          Thank you,  Abdi Kwan CPhT  Medication History Pharmacy Technician      Reviewed.   Clifford Dang, PHARMD

## 2018-06-18 NOTE — IP AVS SNAPSHOT
[address] [facility] 
[phone] Patient: Tiffanie Oconnor MRN: [id number] ZOI:[id number] About your hospitalization You were admitted on:  June 18, 2018 You last received care in the:  [facility] 3 [facility] You were discharged on:  June 22, 2018 Why you were hospitalized Your primary diagnosis was:  Not on File Your diagnoses also included:  Pancreatic Mass Follow-up Information Follow up With Details Comments Contact Info Angi Bowser MD   [address] [address] 
[phone] Haim Schneider MD Go on 7/26/2018 Follow-up appointment [address] [facility] 
[phone] Your Scheduled Appointments Thursday June 28, 2018 10:45 AM EDT  
PACEMAKER with [id number] [facility] (ANTHONY SCHEDULING) [address] [facility] 57  
[phone] Thursday June 28, 2018 11:00 AM EDT  
ESTABLISHED PATIENT with Juan Ren MD  
[facility] ([facility]) [address] [facility] 57  
[phone] Discharge Orders None A check cece indicates which time of day the medication should be taken. My Medications CONTINUE taking these medications Instructions Each Dose to Equal  
 Morning Noon Evening Bedtime  
 alendronate 70 mg tablet Commonly known as:  FOSAMAX Your last dose was: Your next dose is: Take 1 Tab by mouth every seven (7) days. 70 mg  
    
   
   
   
  
 amiodarone 200 mg tablet Commonly known as:  CORDARONE Your last dose was: Your next dose is: Take 1 Tab by mouth daily. 200 mg CALCIUM 600 WITH VITAMIN D3 600 mg(1,500mg) -400 unit Cap Generic drug:  Calcium-Cholecalciferol (D3)  
   
 Your last dose was: Your next dose is: Take 1 Tab by mouth daily. 1 Tab  
    
   
   
   
  
 carvedilol 6.25 mg tablet Commonly known as:  Robbie Mcdaniel Your last dose was: Your next dose is: Take 1 Tab by mouth two (2) times daily (with meals). 6.25 mg  
    
   
   
   
  
 furosemide 20 mg tablet Commonly known as:  LASIX Your last dose was: Your next dose is: TAKE 1 TABLET BY MOUTH DAILY  
     
   
   
   
  
 sacubitril-valsartan 24-26 mg tablet Commonly known as:  ENTRESTO Your last dose was: Your next dose is: Take 1 Tab by mouth two (2) times a day. 1 Tab  
    
   
   
   
  
 sertraline 50 mg tablet Commonly known as:  ZOLOFT Your last dose was: Your next dose is: TAKE 1 TABLET BY MOUTH DAILY  
     
   
   
   
  
 spironolactone 25 mg tablet Commonly known as:  ALDACTONE Your last dose was: Your next dose is: Take 1 Tab by mouth daily. Indications: chronic heart failure 25 mg  
    
   
   
   
  
  
STOP taking these medications   
 rivaroxaban 20 mg Tab tablet Commonly known as:  Mario Alberto Blocker Discharge Instructions None ACO Transitions of Care Introducing [facility] [address] offers a voluntary care coordination program to provide high quality service and care to [facility] fee-for-service beneficiaries. Paulette Medina was designed to help you enhance your health and well-being through the following services: ? Transitions of Care  support for individuals who are transitioning from one care setting to another (example: Hospital to home). ?  Chronic and Complex Care Coordination  support for individuals and caregivers of those with serious or chronic illnesses or with more than one chronic (ongoing) condition and those who take a number of different medications. If you meet specific medical criteria, a [facility] may call you directly to coordinate your care with your primary care physician and your other care providers. For questions about the [facility] programs, please, contact your physicians office. For general questions or additional information about Accountable Care Organizations: 
Please visit www.medicare.gov/acos. html or call 1-800-MEDICARE (9-437.951.7145) TTY users should call 5-213.401.9873. BetUknow Announcement We are excited to announce that we are making your provider's discharge notes available to you in BetUknow. You will see these notes when they are completed and signed by the physician that discharged you from your recent hospital stay. If you have any questions or concerns about any information you see in BetUknow, please call the Health Information Department where you were seen or reach out to your Primary Care Provider for more information about your plan of care. Introducing [facility] HE[facility]! [employer] introduces BetUknow patient portal. Now you can access parts of your medical record, email your doctor's office, and request medication refills online. 1. In your internet browser, go to [email or web address] 2. Click on the First Time User? Click Here link in the Sign In box. You will see the New Member Sign Up page. 3. Enter your BetUknow Access Code exactly as it appears below. You will not need to use this code after youve completed the sign-up process. If you do not sign up before the expiration date, you must request a new code. · BetUknow Access Code: [email or web address] Expires: 6/24/2018 11:28 AM 
 
4. Enter the last four digits of your Social Security Number (xxxx) and Date of Birth (mm/dd/yyyy) as indicated and click Submit.  You will be taken to the next sign-up page. 5. Create a Desert Biker Magazinet ID. This will be your Fiesta Frog login ID and cannot be changed, so think of one that is secure and easy to remember. 6. Create a [employer]inet password. You can change your password at any time. 7. Enter your Password Reset Question and Answer. This can be used at a later time if you forget your password. 8. Enter your e-mail address. You will receive e-mail notification when new information is available in [address] 9. Click Sign Up. You can now view and download portions of your medical record. 10. Click the Download Summary menu link to download a portable copy of your medical information. If you have questions, please visit the Frequently Asked Questions section of the Fiesta Frog website. Remember, Fiesta Frog is NOT to be used for urgent needs. For medical emergencies, dial 911. Now available from your iPhone and Android! Introducing Curtis Patricia As a [facility] patient, I wanted to make you aware of our electronic visit tool called Curtis Patricia. [facility] 24/7 allows you to connect within minutes with a medical provider 24 hours a day, seven days a week via a mobile device or tablet or logging into a secure website from your computer. You can access Curtis Patricia from anywhere in the [place]. A virtual visit might be right for you when you have a simple condition and feel like you just dont want to get out of bed, or cant get away from work for an appointment, when your regular [facility] provider is not available (evenings, weekends or holidays), or when youre out of town and need minor care. Electronic visits cost only $49 and if the [facility] 24/7 provider determines a prescription is needed to treat your condition, one can be electronically transmitted to a nearby pharmacy*. Please take a moment to enroll today if you have not already done so.   The enrollment process is free and takes just a few minutes. To enroll, please download the [address] [place] [address] 24/7 ambrose to your tablet or phone, or visit [email or web address] to enroll on your computer. And, as an [address] [place] [address] patient with a FunCaptcha account, the results of your visits will be scanned into your electronic medical record and your primary care provider will be able to view the scanned results. We urge you to continue to see your regular [address] [place] [address] provider for your ongoing medical care. And while your primary care provider may not be the one available when you seek a Credit Sesame virtual visit, the peace of mind you get from getting a real diagnosis real time can be priceless. For more information on Credit Sesame, view our Frequently Asked Questions (FAQs) at [email or web address]. Sincerely, 
 
Chase Paige MD 
Chief Medical Officer Jagruti Ag *:  certain medications cannot be prescribed via Credit Sesame Unresulted tests-please follow up with your PCP on these results Procedure/Test Authorizing Provider CANCER AG 19-9 Kasey Garcia MD  
 CBC WITH AUTOMATED DIFF Diane Peterson MD  
 CBC WITH AUTOMATED DIFF Diane Peterson MD  
 CBC WITH AUTOMATED DIFF Ascencion Bustamante MD  
 CBC WITH AUTOMATED DIFF [place], [place]  
 CT ABD PELV W CONT Chrissie Moya, [address] , MD  
 CULTURE, [address] [place] [address]  
 EKG, 12 LEAD, INITIAL Chrissie Webber MD  
 HEMOGLOBIN A1C WITH EAG MD Oleksandr Lamb MD  
 LACTOFERRIN, Ascencion Pino MD  
 LIPASE MD Darío Lamb [place], [place]  
 LIPID PANEL Ascencion Bustamante MD  
 METABOLIC PANEL, BASIC Diane Peterson MD  
 METABOLIC PANEL, BASIC Ascencion Bustamante MD  
 METABOLIC PANEL, COMPREHENSIVE Diane Peterson MD  
 METABOLIC PANEL, [place], [place]  
 NT-PRO BNP Chrissie R. Dee Vaughan, STOOL Patito Vera MD  
 PANCREATIC ELASTASE, FECAL Rajat Good MD  
 PROTHROMBIN TIME + INR Patito Vera MD  
 URINALYSIS W/ REFLEX CULTURE Leana Baires, [address]  
 VITAMIN B12 Yosi Diaz MD  
  
Providers Seen During Your Hospitalization Provider Specialty Primary office phone Corinna Lazar DO Emergency Medicine [phone] Carlton Escamilla. Georgene Scheuermann, MD Emergency Medicine [phone] Patito Vera MD Internal Medicine [phone] Your Primary Care Physician (PCP) Primary Care Physician Office Phone Office Fax [phone] [address], [place] [phone] You are allergic to the following No active allergies Recent Documentation Height Weight BMI OB Status Smoking Status 1.549 m 70.2 kg 29.25 kg/m2 Postmenopausal Never Smoker Emergency Contacts Name Discharge Info Relation Home Work Mobile [facility] DISCHARGE CAREGIVER [3] Spouse [phone] Patient Belongings The following personal items are in your possession at time of discharge: 
  Dental Appliances: At bedside  Visual Aid: Glasses, At bedside      Home Medications: None   Jewelry: None  Clothing: At bedside, Footwear, Pants, Shirt, Undergarments    Other Valuables: Eyeglasses Please provide this summary of care documentation to your next provider. Signatures-by signing, you are acknowledging that this After Visit Summary has been reviewed with you and you have received a copy. Patient Signature:  ____________________________________________________________ Date:  ____________________________________________________________  
  
[facility] Provider Signature:  ____________________________________________________________ Date:  ____________________________________________________________

## 2018-06-19 NOTE — CONSULTS
[address]  MR#: [id number]  : 1950  ACCOUNT #: [de-identified]   DATE OF SERVICE: 2018    CHIEF COMPLAINT:  Abnormal pancreatic imaging. REFERRING PHYSICIAN:  Dr. David Ortiz:  I am asked to see the patient Dr. Crescencio Toney for weight loss, diarrhea and an abnormal pancreas CT. She is 80years old. She does not have a gastroenterologist and has not had a prior colonoscopy. She has had diarrhea for about 3 years, which she attributes to Xarelto. She has had about a 20-pound weight loss in the last 6 months. In the last month, she has had bilateral lower abdominal pain that spreads throughout the abdomen. It does not have specific precipitating or relieving factors. It does not radiate to the back. There is no associated nausea or vomiting. She was seen and evaluated in the emergency department, pancreatic imaging was abnormal and she was admitted. PAST MEDICAL AND SURGICAL HISTORY:  Ejection fraction 20% to 25% status post heart attack. She has a pacemaker, likely a pacemaker defibrillator. Chronic anticoagulation, but she has been off Xarelto for about a week. Depression, atrial fibrillation and cardiomyopathy. SOCIAL HISTORY:  Nonsmoker. Light drinker, 5 cans of beer a week. Helps her mother with a garden. FAMILY HISTORY:  Negative for pancreas cancer or colon cancer. ALLERGIES:  NONE KNOWN. PRIOR TO ADMISSION MEDICATION:  Vitamin D3, Fosamax, Aldactone, amiodarone, Coreg, Lasix, Entresto, Zoloft and , Xarelto. REVIEW OF SYSTEMS:  Obtained in detail by Dr. Crescencio Toney, which I reviewed:  GENERAL:  As noted above. GASTROINTESTINAL:  As noted above. Remainder negative. PHYSICAL EXAMINATION:  VITAL SIGNS:  Temperature 97.5, pulse 62, blood pressure 103/66, respirations 18. GENERAL:  Well developed, no acute distress, seen at bedside with her . EYES:  No icterus. Extraocular motions intact.   ENMT: Lips, teeth, gums and oropharynx unremarkable. CHEST:  Clear to auscultation. No use of accessory muscles. HEART:  Regular rate and rhythm. ABDOMEN:  There is a fullness in the upper abdomen, but she is nontender and has good bowel sounds. No palpable mass. LYMPHATIC:  No Sister Mujica Area node, no cervical, supraclavicular or inguinal lymphadenopathy. EXTREMITIES:  No edema. NEUROLOGIC:  Alert and oriented. PSYCHIATRIC:  No evidence of abnormal mood or disorientation. LABORATORY DATA:  Reviewed. Hemoglobin 11.1, white blood cell count 5.9. Sodium 144, potassium 3.8, albumin 3.7. Lipase 1443 on admission, now 420. Triglycerides normal.  CA 19-9 is pending. DIAGNOSTIC DATA:  CT Imaging is reviewed with Radiology. Low attenuation infiltrative mass in the head of the pancreas, with involvement of superior mesenteric vessels and occlusion of the superior mesenteric vein, consistent with pancreatic carcinoma, peripancreatic adenopathy, lumbar spondylosis. IMPRESSION/REPORT/PLAN:  1. Pancreatic mass, with obstruction/occlusion of the superior mesenteric vein and lymph node involvement. By imaging, she is not resectable. I have told the patient and her  this at the bedside. 2.  Diarrhea could be exocrine pancreatic insufficiency. 3.  Weight loss, suspect due to #1.  4.  Chronic anticoagulation. The patient is on chronic anticoagulation because of atrial fibrillation. She has a defibrillator. She is off Xarelto for more than 3 days, so we can safely proceed with necessary biopsies. RECOMMENDATIONS:  1. We need tissue. I reviewed with Dr. Kwan Mclain the CT imaging. He felt that ultrasound-directed transcutaneous biopsy would only have about a 30% chance of success. With this in mind, I have asked Dr. Sara Ross to consider endoscopic ultrasound. 2.  CA 19-9.  3.  Fecal elastase. 4.  EUS with biopsy. --likely to happen 6.21.2018.    5.  Resume Xarelto at appropriate interval.  6. Likely hematology/oncology consultation. I thank Dr. Salomón Lr for this interesting consultation.       MD ISSA Clinton / JESI  D: 06/19/2018 10:18     T: 06/19/2018 10:57  JOB #: [id number]  CC: Rosanne Ivory MD  CC: Lord Sun MENDOZA

## 2018-06-19 NOTE — PROGRESS NOTES
Reason for Admission:  Pancreatic Mass                    RRAT Score:  13                Do you (patient/family) have any concerns for transition/discharge? Pt had no concerns at the time of assessment              Plan for utilizing home health:    No previous HH or SNF in the past     Likelihood of readmission? Mod-High            Transition of Care Plan:    Pt is a 81 y/o female admitted with pancreatic mass. Prior to admission the pt was independent to ADL's and IADL's. She uses no DME for ambulation. Pt spouse transport to her appts. Pt verbalized no concerns at discharge, just wanting to know when she could eat and would be discharged. CM will continue to follow and assist with additional discharge needs. Care Management Interventions  PCP Verified by CM: Yes (Last seen MD yesterday)  Transition of Care Consult (CM Consult): Discharge Planning  Discharge Durable Medical Equipment:  (No DME for ambulation)  Current Support Network: Lives with Spouse (Pt resides in a trailer with 4 steps to enter)  Confirm Follow Up Transport: Family (Pt spouse provides transportation to her appts)  Plan discussed with Pt/Family/Caregiver: Yes  Discharge Location  Discharge Placement: Home    DONTAE Rose  Ext [phone]    .

## 2018-06-19 NOTE — PROGRESS NOTES
Problem: Falls - Risk of  Goal: *Absence of Falls  Document Arin Fall Risk and appropriate interventions in the flowsheet.    Outcome: Progressing Towards Goal  Fall Risk Interventions:            Medication Interventions: Assess postural VS orthostatic hypotension, Patient to call before getting OOB         History of Falls Interventions: Consult care management for discharge planning, Room close to nurse's station

## 2018-06-19 NOTE — PROGRESS NOTES
Bedside and Verbal shift change report given to Benita Diaz (oncoming nurse) by Bo Carlisle (offgoing nurse). Report included the following information SBAR, Kardex, Intake/Output, MAR and Recent Results.

## 2018-06-19 NOTE — PROGRESS NOTES
Bedside and Verbal shift change report given to Trish LIMA (oncoming nurse) by Fabian Bernal (offgoing nurse). Report included the following information SBAR, Kardex, Intake/Output, MAR and Recent Results. Zone Phone for oncoming shift:   [phone]    Shift Summary: Patient rested quietly throughout the night. No C/O pain voiced. LDAs               Peripheral IV 06/18/18 Right Antecubital (Active)   Site Assessment Clean, dry, & intact 6/19/2018  3:47 AM   Phlebitis Assessment 0 6/19/2018  3:47 AM   Infiltration Assessment 0 6/19/2018  3:47 AM   Dressing Status Clean, dry, & intact 6/19/2018  3:47 AM   Dressing Type Tape;Transparent 6/19/2018  3:47 AM   Hub Color/Line Status Pink; Infusing 6/19/2018  3:47 AM                        Intake & Output     Last Bowel Movement Last Bowel Movement Date: 06/18/18   Glucose Checks [] N/A  [] AC/HS  [] Q6  Concerns:   Nutrition Active Orders   Diet    DIET NPO       Consults [x]PT  [x]OT  []Speech  []Case Management   Cardiac Monitoring []N/A [x]Yes Expires: 24 hours

## 2018-06-19 NOTE — PROGRESS NOTES
Hospitalist Progress Note    NAME: Palmira Viramontes   :  1950   MRN:  [id number]       Assessment / Plan:  *Seen by GI  EUS with biopsy. --likely to happen 2018    Abd pain  Pancreatitis  Pancreatic mass  Hypotension secondary to diarrhea  -CT a/p showed Low-attenuation infiltrative mass in the head of the pancreas with  involvement of the superior mesenteric vessels and occlusion of the superior  mesenteric vein consistent with pancreatic carcinoma. Peripancreatic adenopathy   -diarrhea had been on going for several years. Her  states it started right after Xarelto was started. -pain control with IV morphine/norco prn  -gentle hydration due chronic heart failure with EF 20-25%  -NPO and plan for EUS in the AM  -Xarelto discontinued for 2 days now due to worsening of diarrhea  -will send stool studies  -Discussed case with Dr Bev Wills    Diarrhea  -getting studies     NICM with EF 20-25% s/p biV pacemaker  Paroxysmal afib  -hold antihypertensives due to hypotension  -cont' amiodarone  -hydralazine prn  -xarelto held for 2 days now per pt. I confirmed it with her         Code Status: Full  Surrogate Decision Maker: pt's   DVT Prophylaxis: scds  GI Prophylaxis: not indicated     Subjective:     Chief Complaint / Reason for Physician Visit  \"Oh, I'm doing ok\"  Pt states that cramps made her come in , but they are much better now      Review of Systems:  Symptom Y/N Comments  Symptom Y/N Comments   Fever/Chills    Chest Pain     Poor Appetite    Edema     Cough    Abdominal Pain     Sputum    Joint Pain     SOB/YANEZ    Pruritis/Rash     Nausea/vomit    Tolerating PT/OT     Diarrhea    Tolerating Diet     Constipation    Other       Could NOT obtain due to:      Objective:     VITALS:   Last 24hrs VS reviewed since prior progress note.  Most recent are:  Patient Vitals for the past 24 hrs:   Temp Pulse Resp BP SpO2   18 1147 97.8 °F (36.6 °C) 60 16 110/62 94 %   18 0756 97.5 °F (36.4 °C) 62 18 103/66 97 %   06/19/18 0319 97.7 °F (36.5 °C) 60 18 103/58 97 %   06/19/18 0015 97.5 °F (36.4 °C) 60 18 98/54 95 %   06/18/18 2047 97.3 °F (36.3 °C) 61 18 108/73 99 %   06/18/18 1900 98.4 °F (36.9 °C) 60 14 99/74 98 %   06/18/18 1800 - - - 113/71 -       Intake/Output Summary (Last 24 hours) at 06/19/18 1723  Last data filed at 06/19/18 1216   Gross per 24 hour   Intake            867.5 ml   Output                0 ml   Net            867.5 ml        PHYSICAL EXAM:  General: In bed, in no distress  EENT:  EOMI. Anicteric sclerae. MMM  Resp:  CTA bilaterally, no wheezing or rales. No accessory muscle use  CV:  Regular  rhythm,  No edema  GI:  Soft, Non distended, Non tender.  +Bowel sounds  Neurologic:  Alert and oriented X 3, normal speech,   Psych:   Good insight. Not anxious nor agitated  Skin:  No rashes. No jaundice    Reviewed most current lab test results and cultures  YES  Reviewed most current radiology test results   YES  Review and summation of old records today    NO  Reviewed patient's current orders and MAR    YES  PMH/ reviewed - no change compared to H&P  ________________________________________________________________________  Care Plan discussed with:    Comments   Patient     Family      RN     Care Manager     Consultant                        Multidiciplinary team rounds were held today with , nursing, pharmacist and clinical coordinator. Patient's plan of care was discussed; medications were reviewed and discharge planning was addressed.      ________________________________________________________________________  Total NON critical care TIME:  25   Minutes    Total CRITICAL CARE TIME Spent:   Minutes non procedure based      Comments   >50% of visit spent in counseling and coordination of care     ________________________________________________________________________  Mone Collins MD     Procedures: see electronic medical records for all procedures/Xrays and details which were not copied into this note but were reviewed prior to creation of Plan. LABS:  I reviewed today's most current labs and imaging studies.   Pertinent labs include:  Recent Labs      06/19/18   0415  06/18/18   1249   WBC  5.9  6.5   HGB  11.1*  11.7   HCT  34.0*  35.3   PLT  249  286     Recent Labs      06/19/18   0415  06/18/18   1249   NA  144  142   K  3.8  3.9   CL  110*  106   CO2  25  26   GLU  110*  119*   BUN  14  14   CREA  0.74  0.88   CA  8.4*  9.5   ALB   --   3.7   TBILI   --   0.6   SGOT   --   16   ALT   --   21       Signed: Lizbet Quiñones MD

## 2018-06-20 NOTE — PROGRESS NOTES
F/U for pancreatic mass    S: Ms. Kalie Jewell was seen by me today during rounds. At this time, she is resting + comfortably. The patient has no new complaints today. Please see admission consult for details of ROS; there are + changes today. Pain is well controlled. O: Blood pressure 113/51, pulse 60, temperature 98.3 °F (36.8 °C), resp. rate 17, height 5' 1\" (1.549 m), weight 70.9 kg (156 lb 3.2 oz), SpO2 97 %. Gen: Patient is in no acute distress. There is no jaundice. Lungs: Clear to auscultation bilaterally . Heart:+RRR. Abd: Soft, non tender, non-distended, bowel sounds present. Extremities: Warm. Cross sectional imaging:  None new  Lab Results   Component Value Date/Time    WBC 6.5 06/20/2018 04:06 AM    HGB 11.0 (L) 06/20/2018 04:06 AM    HCT 33.6 (L) 06/20/2018 04:06 AM    PLATELET 544 68/47/4492 04:06 AM    .2 (H) 06/20/2018 04:06 AM     Lab Results   Component Value Date/Time    Lipase 420 (H) 06/19/2018 04:15 AM     Lab Results   Component Value Date/Time    Sodium 143 06/20/2018 04:06 AM    Potassium 3.8 06/20/2018 04:06 AM    Chloride 113 (H) 06/20/2018 04:06 AM    CO2 22 06/20/2018 04:06 AM    Anion gap 8 06/20/2018 04:06 AM    Glucose 110 (H) 06/20/2018 04:06 AM    BUN 10 06/20/2018 04:06 AM    Creatinine 0.65 06/20/2018 04:06 AM    BUN/Creatinine ratio 15 06/20/2018 04:06 AM    GFR est AA >60 06/20/2018 04:06 AM    GFR est non-AA >60 06/20/2018 04:06 AM    Calcium 8.0 (L) 06/20/2018 04:06 AM    Bilirubin, total 0.6 06/18/2018 12:49 PM    AST (SGOT) 16 06/18/2018 12:49 PM    Alk. phosphatase 73 06/18/2018 12:49 PM    Protein, total 7.5 06/18/2018 12:49 PM    Albumin 3.7 06/18/2018 12:49 PM    Globulin 3.8 06/18/2018 12:49 PM    A-G Ratio 1.0 (L) 06/18/2018 12:49 PM    ALT (SGPT) 21 06/18/2018 12:49 PM       A: Active Problems:    Pancreatic mass (6/18/2018)        Comment:  Ca 19-9 pending   P:  I have communicated with Dr Cas Purvis   eus with bx tomorrow 1200.     I recommend med onc consult. --I ordered  Will follow   I discussed with her the objectives, risks, consequences and alternatives to the procedure.          Paras Bashir MD  8:36 AM  6/20/2018

## 2018-06-20 NOTE — PROGRESS NOTES
Bedside shift change report given to Trish LIMA (oncoming nurse) by Teofilo Hart RN   (offgoing nurse). Report included the following information SBAR, Kardex, MAR and Recent Results.

## 2018-06-20 NOTE — PROGRESS NOTES
I spoke with patient and  at bedside. I advised them of ct chest pending  No role for surgery  eus with bx tomorrow  All questions answered. Trish LIMA present.     Yanni De Dios MD  2:00 PM  6/20/2018

## 2018-06-20 NOTE — PROGRESS NOTES
Bedside and Verbal shift change report given to [place] (oncoming nurse) by Ashley Bob (offgoing nurse). Report included the following information SBAR, Kardex, Intake/Output, MAR and Recent Results.

## 2018-06-20 NOTE — PROGRESS NOTES
Hospitalist Progress Note    NAME: Jhonny Jordan   :  1950   MRN:  [id number]       Assessment / Plan:  *Seen by GI and Hem Onc, appreciate [facility]   EUS with biopsy  2018    Abd pain  Pancreatitis  Pancreatic mass  Hypotension secondary to diarrhea  -CT a/p showed Low-attenuation infiltrative mass in the head of the pancreas with  involvement of the superior mesenteric vessels and occlusion of the superior  mesenteric vein consistent with pancreatic carcinoma. Peripancreatic adenopathy   -diarrhea had been on going for several years. Her  states it started right after Xarelto was started. -pain control with IV morphine/norco prn  -gentle hydration due chronic heart failure with EF 20-25%  -NPO and plan for EUS in the AM  -Xarelto discontinued for 2 days now due to worsening of diarrhea  -will send stool studies  -Discussed case with Dr Gerri Da Silva    Diarrhea  -getting studies     NICM with EF 20-25% s/p biV pacemaker  Paroxysmal afib  -hold antihypertensives due to hypotension  -cont' amiodarone  -hydralazine prn  -xarelto held for 2 days now per pt. I confirmed it with her         Code Status: Full  Surrogate Decision Maker: pt's   DVT Prophylaxis: scds  GI Prophylaxis: not indicated     Subjective:     Chief Complaint / Reason for Physician Visit  Nil complaints      Review of Systems:  Symptom Y/N Comments  Symptom Y/N Comments   Fever/Chills    Chest Pain     Poor Appetite    Edema     Cough    Abdominal Pain     Sputum    Joint Pain     SOB/YANEZ    Pruritis/Rash     Nausea/vomit    Tolerating PT/OT     Diarrhea    Tolerating Diet     Constipation    Other       Could NOT obtain due to:      Objective:     VITALS:   Last 24hrs VS reviewed since prior progress note.  Most recent are:  Patient Vitals for the past 24 hrs:   Temp Pulse Resp BP SpO2   18 1144 98.3 °F (36.8 °C) 62 20 105/74 98 %   18 0822 98 °F (36.7 °C) 60 18 125/65 99 %   18 0330 98.3 °F (36.8 °C) 60 17 113/51 97 %   06/19/18 2351 97.6 °F (36.4 °C) 61 18 103/56 99 %   06/19/18 2005 98.2 °F (36.8 °C) 60 18 108/56 98 %   06/19/18 1545 98.2 °F (36.8 °C) 61 18 102/64 96 %       Intake/Output Summary (Last 24 hours) at 06/20/18 1255  Last data filed at 06/20/18 0900   Gross per 24 hour   Intake          1963.75 ml   Output                0 ml   Net          1963.75 ml        PHYSICAL EXAM:  General: Lying comfortably, communicative and in good mood  EENT:  EOMI. Anicteric sclerae. MMM  Resp:  CTA bilaterally, no wheezing or rales. No accessory muscle use  CV:  Regular  rhythm,  No edema  GI:  Soft, Non distended, Non tender.  +Bowel sounds  Neurologic:  Alert and oriented X 3, normal speech,   Psych:   Good insight. Not anxious nor agitated  Skin:  No rashes. No jaundice    Reviewed most current lab test results and cultures  YES  Reviewed most current radiology test results   YES  Review and summation of old records today    NO  Reviewed patient's current orders and MAR    YES  PMH/ reviewed - no change compared to H&P  ________________________________________________________________________  Care Plan discussed with:    Comments   Patient     Family      RN     Care Manager     Consultant                        Multidiciplinary team rounds were held today with , nursing, pharmacist and clinical coordinator. Patient's plan of care was discussed; medications were reviewed and discharge planning was addressed.      ________________________________________________________________________  Total NON critical care TIME:  20   Minutes    Total CRITICAL CARE TIME Spent:   Minutes non procedure based      Comments   >50% of visit spent in counseling and coordination of care     ________________________________________________________________________  Rasheed Booth MD     Procedures: see electronic medical records for all procedures/Xrays and details which were not copied into this note but were reviewed prior to creation of Plan. LABS:  I reviewed today's most current labs and imaging studies.   Pertinent labs include:  Recent Labs      06/20/18   0406  06/19/18   0415  06/18/18   1249   WBC  6.5  5.9  6.5   HGB  11.0*  11.1*  11.7   HCT  33.6*  34.0*  35.3   PLT  249  249  286     Recent Labs      06/20/18   0406  06/19/18   0415  06/18/18   1249   NA  143  144  142   K  3.8  3.8  3.9   CL  113*  110*  106   CO2  22  25  26   GLU  110*  110*  119*   BUN  10  14  14   CREA  0.65  0.74  0.88   CA  8.0*  8.4*  9.5   ALB   --    --   3.7   TBILI   --    --   0.6   SGOT   --    --   16   ALT   --    --   21       Signed: Patito Vera MD

## 2018-06-20 NOTE — CONSULTS
Hematology Oncology Consultation    Reason for consult: Pancreatic mass    Admitting Diagnosis: Pancreatic mass  mass in pancreas    Impression: Mrs Marla Garcia is a 81 yo female with pmh of ischemic CM who presents with pancreatic mass concerning for malignancy. *) Pancreatic head mass:  CT scan shows pancreatic head mass with involvement of the superior mesenteric vessels and occlusion of the superior mesenteric vein, very concerning for advanced stage disease. CA 19-9 pending now and Dr Pro Quigley is planning EUS with bx on  to try and obtain diagnosis. I will order CT Chest for staging purposes as well. We will await pathology results prior to making final plan. She will need a port placed as well, I will order this to be done prior to discharge. *) Ischemic CM with EF 20-25%:   - this may affect treatment options once dx has been established. Thank you for this consult, I will follow along with you. Discussion: Amos Mejia is a  80y.o. year old seen in consultation at the request of Dr. Pro Quigley for pancreatic mass. Mrs Marla Garcia presented with abd pains that prompted further w/u. CT scan showed a mass at the head of the pancreas. She is feeling well but has had some wt loss over the last few months ~10-15lbs, she has had diarrhea and abd pains for a while, sever months of pain and years on diarrhea, both seem resolved,  feels it was due to xarelto use however. She did have a grand mother with pancreatic cancer dx in her [de-identified]. Denies tobacco abuse. Imagin18 CT A/P:  1. Low-attenuation infiltrative mass in the head of the pancreas with  involvement of the superior mesenteric vessels and occlusion of the superior  mesenteric vein consistent with pancreatic carcinoma. 2. Peripancreatic adenopathy.   3. Lumbar spondylosis.         Labs:    Recent Results (from the past 24 hour(s))   OCCULT BLOOD, STOOL    Collection Time: 18 12:04 PM   Result Value Ref Range    Occult blood, stool NEGATIVE  NEG     CBC WITH AUTOMATED DIFF    Collection Time: 06/20/18  4:06 AM   Result Value Ref Range    WBC 6.5 3.6 - 11.0 K/uL    RBC 3.32 (L) 3.80 - 5.20 M/uL    HGB 11.0 (L) 11.5 - 16.0 g/dL    HCT 33.6 (L) 35.0 - 47.0 %    .2 (H) 80.0 - 99.0 FL    MCH 33.1 26.0 - 34.0 PG    MCHC 32.7 30.0 - 36.5 g/dL    RDW 12.3 11.5 - 14.5 %    PLATELET 404 446 - 210 K/uL    MPV 9.2 8.9 - 12.9 FL    NRBC 0.0 0  WBC    ABSOLUTE NRBC 0.00 0.00 - 0.01 K/uL    NEUTROPHILS 62 32 - 75 %    LYMPHOCYTES 25 12 - 49 %    MONOCYTES 10 5 - 13 %    EOSINOPHILS 1 0 - 7 %    BASOPHILS 1 0 - 1 %    IMMATURE GRANULOCYTES 0 0.0 - 0.5 %    ABS. NEUTROPHILS 4.1 1.8 - 8.0 K/UL    ABS. LYMPHOCYTES 1.7 0.8 - 3.5 K/UL    ABS. MONOCYTES 0.7 0.0 - 1.0 K/UL    ABS. EOSINOPHILS 0.1 0.0 - 0.4 K/UL    ABS. BASOPHILS 0.1 0.0 - 0.1 K/UL    ABS. IMM.  GRANS. 0.0 0.00 - 0.04 K/UL    DF AUTOMATED     METABOLIC PANEL, BASIC    Collection Time: 06/20/18  4:06 AM   Result Value Ref Range    Sodium 143 136 - 145 mmol/L    Potassium 3.8 3.5 - 5.1 mmol/L    Chloride 113 (H) 97 - 108 mmol/L    CO2 22 21 - 32 mmol/L    Anion gap 8 5 - 15 mmol/L    Glucose 110 (H) 65 - 100 mg/dL    BUN 10 6 - 20 MG/DL    Creatinine 0.65 0.55 - 1.02 MG/DL    BUN/Creatinine ratio 15 12 - 20      GFR est AA >60 >60 ml/min/1.73m2    GFR est non-AA >60 >60 ml/min/1.73m2    Calcium 8.0 (L) 8.5 - 10.1 MG/DL       History:  Past Medical History:   Diagnosis Date    Atrial fibrillation (HCC)     Cardiomyopathy, nonischemic (HCC)     Congestive heart failure, unspecified     Depression     History of amiodarone therapy 12/30/2011    ICD (implantable cardiac defibrillator) in place 9/18/09    [facility]      Past Surgical History:   Procedure Laterality Date    ECHO 2D ADULT  4/1/2011    EF 30%, mild to moderate LA enlargement, mild MR    HX HEART CATHETERIZATION  2006    no CAD, EF 25%    HX PACEMAKER      HX PACEMAKER PLACEMENT  2009    IR FLUOROSCOPY < 1 HOUR  7/30/2012         DEBBI US BX BREAST LT 1ST LESION W/CLIP AND SPECIMEN Left     2015--neg    [place] REMV TRANSVEN PACER ELECTRODE,1 LEAD  6/21/2016           Prior to Admission medications    Medication Sig Start Date End Date Taking? Authorizing Provider   Calcium-Cholecalciferol, D3, (CALCIUM 600 WITH VITAMIN D3) 600 mg(1,500mg) -400 unit cap Take 1 Tab by mouth daily. Yes Historical Provider   alendronate (FOSAMAX) 70 mg tablet Take 1 Tab by mouth every seven (7) days. 4/18/18  Yes Guy Ding MD   spironolactone (ALDACTONE) 25 mg tablet Take 1 Tab by mouth daily. Indications: chronic heart failure 2/23/18  Yes Ro Sosa MD   amiodarone (CORDARONE) 200 mg tablet Take 1 Tab by mouth daily. 2/23/18  Yes Ro Sosa MD   carvedilol (COREG) 6.25 mg tablet Take 1 Tab by mouth two (2) times daily (with meals). 2/23/18  Yes Ro Sosa MD   furosemide (LASIX) 20 mg tablet TAKE 1 TABLET BY MOUTH DAILY 2/23/18  Yes Ro Sosa MD   sacubitril-valsartan (ENTRESTO) 24 mg/26 mg tablet Take 1 Tab by mouth two (2) times a day.  2/23/18  Yes Ro Sosa MD   sertraline (ZOLOFT) 50 mg tablet TAKE 1 TABLET BY MOUTH DAILY 10/16/17  Yes Guy Ding MD   rivaroxaban (XARELTO) 20 mg tab tablet TAKE 1 TABLET BY MOUTH EVERY DAY 4/20/17  Yes Guy Ding MD     No Known Allergies   Social History   Substance Use Topics    Smoking status: Never Smoker    Smokeless tobacco: Never Used    Alcohol use 2.5 oz/week     5 Cans of beer per week      Comment: occasional      Family History   Problem Relation Age of Onset    High Cholesterol Mother         Current Medications:  Current Facility-Administered Medications   Medication Dose Route Frequency    folic acid (FOLVITE) tablet 1 mg  1 mg Oral DAILY    0.9% sodium chloride infusion  50 mL/hr IntraVENous RAD ONCE    iopamidol (ISOVUE-370) 76 % injection 100 mL  100 mL IntraVENous RAD ONCE    sodium chloride (NS) flush 10 mL  10 mL IntraVENous RAD ONCE    sodium chloride (NS) flush 5-10 mL  5-10 mL IntraVENous Q8H    sodium chloride (NS) flush 5-10 mL  5-10 mL IntraVENous PRN    0.9% sodium chloride infusion  75 mL/hr IntraVENous CONTINUOUS    acetaminophen (TYLENOL) tablet 650 mg  650 mg Oral Q4H PRN    naloxone (NARCAN) injection 0.4 mg  0.4 mg IntraVENous PRN    HYDROcodone-acetaminophen (NORCO) 5-325 mg per tablet 1 Tab  1 Tab Oral Q4H PRN    ondansetron (ZOFRAN) injection 4 mg  4 mg IntraVENous Q4H PRN    bisacodyl (DULCOLAX) suppository 10 mg  10 mg Rectal DAILY PRN    amiodarone (CORDARONE) tablet 200 mg  200 mg Oral DAILY    sertraline (ZOLOFT) tablet 50 mg  50 mg Oral QPM    hydrALAZINE (APRESOLINE) 20 mg/mL injection 10 mg  10 mg IntraVENous Q6H PRN    morphine injection 1 mg  1 mg IntraVENous Q4H PRN         Review of Systems: 12 point obtained and negative other than stated in hpi    Physical Exam:  Constitutional Alert, cooperative, oriented. . Well developed. Head Normocephalic; no scars   Eyes Conjunctivae and sclerae are clear and without icterus. Pupils are reactive and equal.   ENMT Adentulous, no thrush   Neck Supple without masses or thyromegaly. No jugular venous distension. Hematologic/Lymphatic No petechiae or purpura. No tender or palpable lymph nodes in the cervical, supraclavicular, axillary or inguinal area. Respiratory Lungs are clear to auscultation without rhonchi or wheezing. Cardiovascular Regular rate and rhythm of heart without murmurs, gallops or rubs. Chest / Line Site Chest is symmetric with no chest wall deformities. Abdomen Non-tender, non-distended, no masses, ascites or hepatosplenomegaly. Good bowel sounds. No guarding or rebound tenderness. No pulsatile masses. Musculoskeletal No tenderness or swelling, normal range of motion without obvious weakness. Extremities No visible deformities, no cyanosis, clubbing or edema. Skin No rashes, scars, or lesions suggestive of malignancy.  No petechiae, purpura, or ecchymoses. No excoriations. Neurologic  cranial nerves intact. Psychiatric Alert and oriented times three. Coherent speech. Verbalizes understanding of our discussions today.

## 2018-06-20 NOTE — PROGRESS NOTES
Problem: Falls - Risk of  Goal: *Absence of Falls  Document Arin Fall Risk and appropriate interventions in the flowsheet. Outcome: Progressing Towards Goal  Fall Risk Interventions:            Medication Interventions: Patient to call before getting OOB         History of Falls Interventions:  Investigate reason for fall

## 2018-06-21 NOTE — PROGRESS NOTES
TRANSFER - IN REPORT:    Verbal report received from babar(name) on Helder Breen  being received from 3259(unit) for ordered procedure      Report consisted of patients Situation, Background, Assessment and   Recommendations(SBAR). Information from the following report(s) SBAR, Kardex and MAR was reviewed with the receiving nurse. Opportunity for questions and clarification was provided. Assessment completed upon patients arrival to unit and care assumed.

## 2018-06-21 NOTE — PROGRESS NOTES
Anesthesia reports 320mg Propofol, 60mg Lidocaine and 450mL NS given during procedure. Received report from anesthesia staff on vital signs and status of patient.

## 2018-06-21 NOTE — PROGRESS NOTES
TRANSFER - OUT REPORT:    Verbal report given to Alana(name) on Cyn Meeks  being transferred to [facility](unit) for routine progression of care       Report consisted of patients Situation, Background, Assessment and   Recommendations(SBAR). Information from the following report(s) SBAR and Recent Results was reviewed with the receiving nurse. Lines:   Peripheral IV 06/20/18 Left Forearm (Active)   Site Assessment Clean, dry, & intact 6/21/2018 12:53 PM   Phlebitis Assessment 0 6/21/2018 12:53 PM   Infiltration Assessment 0 6/21/2018 12:53 PM   Dressing Status Clean, dry, & intact 6/21/2018 12:53 PM   Dressing Type Transparent 6/21/2018 12:53 PM   Hub Color/Line Status Blue 6/21/2018  7:42 AM        Opportunity for questions and clarification was provided.

## 2018-06-21 NOTE — PROGRESS NOTES
F/U for pancreatic mass    S: Ms. Blu Cruz was seen by me today during rounds. At this time, she is resting + comfortably. The patient hasno new complaints today. Please see admission consult for details of ROS; there are no other  changes today. O: Blood pressure 103/65, pulse (!) 57, temperature 98.7 °F (37.1 °C), resp. rate 16, height 5' 1\" (1.549 m), weight 70.9 kg (156 lb 3.2 oz), SpO2 95 %. Gen: Patient is in no acute distress. There is no jaundice. Lungs: Clear to auscultation bilaterally . Heart:+RRR. Abd: Soft, non tender, non-distended, bowel sounds present. Extremities: Warm.   Cross sectional imaging:  None new    A: Active Problems:    Pancreatic mass (6/18/2018)        Comment:  Ca 19-9 pending  P:  eus today  Await results of testing

## 2018-06-21 NOTE — ANESTHESIA POSTPROCEDURE EVALUATION
Post-Anesthesia Evaluation and Assessment    Patient: Noelle Opitz MRN: [id number]  SSN: [id number]    YOB: 1950  Age: 80 y.o. Sex: female       Cardiovascular Function/Vital Signs  Visit Vitals    /67    Pulse 65    Temp 37.1 °C (98.8 °F)    Resp 16    Ht 5' 1\" (1.549 m)    Wt 70.2 kg (154 lb 12.8 oz)    SpO2 98%    BMI 29.25 kg/m2       Patient is status post total IV anesthesia anesthesia for Procedure(s):  ENDOSCOPIC ULTRASOUND (EUS)  FINE NEEDLE ASPIRATION  ESOPHAGOGASTRODUODENOSCOPY (EGD)  INJECTION. Nausea/Vomiting: None    Postoperative hydration reviewed and adequate. Pain:  Pain Scale 1: Visual (06/21/18 1247)  Pain Intensity 1: 0 (06/21/18 1247)   Managed    Neurological Status:   Neuro  Neurologic State: Alert (06/21/18 7714)  Orientation Level: Oriented X4 (06/21/18 0929)  Cognition: Follows commands (06/21/18 5530)  Speech: Clear (06/21/18 0929)   At baseline    Mental Status and Level of Consciousness: Arousable    Pulmonary Status:   O2 Device: Room air (06/21/18 1258)   Adequate oxygenation and airway patent    Complications related to anesthesia: None    Post-anesthesia assessment completed.  No concerns    Signed By: Kasi Taylor DO     June 21, 2018

## 2018-06-21 NOTE — PROGRESS NOTES
Pt transported back to room 3259 per stretcher per [place] from [employer]. Pt in stable condition.

## 2018-06-21 NOTE — H&P
Pre-endoscopy H and P    The patient was seen and examined in the room/pre-op holding area. The airway was assessed and documented. The problem list, past medical history, and medications were reviewed. Patient Active Problem List   Diagnosis Code    Idiopathic cardiomyopathy ([facility] [address] I42.8    Automatic implantable cardiac defibrillator in situ Z95.810    Paroxysmal ventricular tachycardia (HCC) I47.2    PAF (paroxysmal atrial fibrillation) (HCC) I48.0    History of amiodarone therapy Z92.29    Depression F32.9    Hypertension I10    Chronic systolic congestive heart failure, NYHA class 3 (HCC) I50.22    Biventricular ICD (implantable cardioverter-defibrillator) in place Z95.810    Left bundle branch block (LBBB) on electrocardiogram I44.7    Pancreatic mass K86.9     Social History     Social History    Marital status:      Spouse name: N/A    Number of children: N/A    Years of education: N/A     Occupational History    Not on file. Social History Main Topics    Smoking status: Never Smoker    Smokeless tobacco: Never Used    Alcohol use 2.5 oz/week     5 Cans of beer per week      Comment: occasional    Drug use: No    Sexual activity: Not on file     Other Topics Concern    Not on file     Social History Narrative    Lives with      Past Medical History:   Diagnosis Date    Atrial fibrillation ([facility] [address]     Cardiomyopathy, nonischemic ([address]     Congestive heart failure, unspecified     Depression     History of amiodarone therapy 12/30/2011    ICD (implantable cardiac defibrillator) in place 9/18/09    [facility] [place]         Prior to Admission Medications   Prescriptions Last Dose Informant Patient Reported? Taking? Calcium-Cholecalciferol, D3, (CALCIUM 600 WITH VITAMIN D3) 600 mg(1,500mg) -400 unit cap 6/18/2018 at Unknown time Self Yes Yes   Sig: Take 1 Tab by mouth daily.    alendronate (FOSAMAX) 70 mg tablet 6/13/2018 at Unknown time Self No Yes Sig: Take 1 Tab by mouth every seven (7) days. amiodarone (CORDARONE) 200 mg tablet 6/18/2018 at Unknown time Self No Yes   Sig: Take 1 Tab by mouth daily. carvedilol (COREG) 6.25 mg tablet 6/18/2018 at Unknown time Self No Yes   Sig: Take 1 Tab by mouth two (2) times daily (with meals). furosemide (LASIX) 20 mg tablet 6/18/2018 at Unknown time Self No Yes   Sig: TAKE 1 TABLET BY MOUTH DAILY   rivaroxaban (XARELTO) 20 mg tab tablet 6/16/2018 at Unknown time Self No Yes   Sig: TAKE 1 TABLET BY MOUTH EVERY DAY   sacubitril-valsartan (ENTRESTO) 24 mg/26 mg tablet 6/18/2018 at Unknown time Self No Yes   Sig: Take 1 Tab by mouth two (2) times a day. sertraline (ZOLOFT) 50 mg tablet 6/17/2018 at Unknown time Self No Yes   Sig: TAKE 1 TABLET BY MOUTH DAILY   spironolactone (ALDACTONE) 25 mg tablet 6/18/2018 at Unknown time Self No Yes   Sig: Take 1 Tab by mouth daily. Indications: chronic heart failure      Facility-Administered Medications: None           The review of systems is:  Negative  for shortness of breath or chest pain      The heart, lungs, and mental status were satisfactory for the administration of deep sedation and for the procedure. I discussed with the patient the objectives, risks, consequences and alternatives to the procedure.       Derek Mendez MD  6/21/2018  12:06 PM

## 2018-06-21 NOTE — PROGRESS NOTES
Hematology Oncology Progress Note       Follow up for: Pancreatic mass    Chart notes reviewed since last visit. Case discussed with following:     Patient complains of the following: Patient back from EUS, sleepy but answers questions, no complaints, no pain reported.     Additional concerns noted by the staff:     Patient Vitals for the past 24 hrs:   BP Temp Pulse Resp SpO2 Weight   06/21/18 1304 - - 66 15 98 % -   06/21/18 1302 121/63 - 68 15 99 % -   06/21/18 1300 114/67 - 65 16 98 % -   06/21/18 1258 127/73 - 72 18 99 % -   06/21/18 1252 122/59 - 64 14 99 % -   06/21/18 1251 120/58 - 66 21 99 % -   06/21/18 1249 106/52 - 68 16 99 % -   06/21/18 1248 106/52 98.8 °F (37.1 °C) 68 15 99 % -   06/21/18 1247 106/52 - 60 20 96 % -   06/21/18 1126 - 98.5 °F (36.9 °C) - - - -   06/21/18 1120 108/54 - 60 20 - -   06/21/18 1115 108/54 98.2 °F (36.8 °C) 60 19 100 % -   06/21/18 0821 - - - - - 70.2 kg (154 lb 12.8 oz)   06/21/18 0742 115/58 98.4 °F (36.9 °C) 60 18 97 % -   06/20/18 2307 103/65 98.7 °F (37.1 °C) (!) 57 16 95 % -   06/20/18 2011 98/46 98.8 °F (37.1 °C) 67 16 95 % -   06/20/18 1530 102/57 98.4 °F (36.9 °C) 62 18 97 % -       Labs:  Recent Results (from the past 24 hour(s))   IRON PROFILE    Collection Time: 06/21/18  5:38 AM   Result Value Ref Range    Iron 65 35 - 150 ug/dL    TIBC 345 250 - 450 ug/dL    Iron % saturation 19 (L) 20 - 50 %   CBC WITH AUTOMATED DIFF    Collection Time: 06/21/18  5:38 AM   Result Value Ref Range    WBC 6.3 3.6 - 11.0 K/uL    RBC 3.13 (L) 3.80 - 5.20 M/uL    HGB 10.5 (L) 11.5 - 16.0 g/dL    HCT 31.9 (L) 35.0 - 47.0 %    .9 (H) 80.0 - 99.0 FL    MCH 33.5 26.0 - 34.0 PG    MCHC 32.9 30.0 - 36.5 g/dL    RDW 12.4 11.5 - 14.5 %    PLATELET 315 193 - 116 K/uL    MPV 9.1 8.9 - 12.9 FL    NRBC 0.0 0  WBC    ABSOLUTE NRBC 0.00 0.00 - 0.01 K/uL    NEUTROPHILS 65 32 - 75 %    LYMPHOCYTES 25 12 - 49 %    MONOCYTES 10 5 - 13 %    EOSINOPHILS 1 0 - 7 %    BASOPHILS 1 0 - 1 % IMMATURE GRANULOCYTES 0 0.0 - 0.5 %    ABS. NEUTROPHILS 4.1 1.8 - 8.0 K/UL    ABS. LYMPHOCYTES 1.6 0.8 - 3.5 K/UL    ABS. MONOCYTES 0.6 0.0 - 1.0 K/UL    ABS. EOSINOPHILS 0.0 0.0 - 0.4 K/UL    ABS. BASOPHILS 0.0 0.0 - 0.1 K/UL    ABS. IMM. GRANS. 0.0 0.00 - 0.04 K/UL    DF AUTOMATED     METABOLIC PANEL, COMPREHENSIVE    Collection Time: 18  5:38 AM   Result Value Ref Range    Sodium 143 136 - 145 mmol/L    Potassium 3.6 3.5 - 5.1 mmol/L    Chloride 114 (H) 97 - 108 mmol/L    CO2 20 (L) 21 - 32 mmol/L    Anion gap 9 5 - 15 mmol/L    Glucose 112 (H) 65 - 100 mg/dL    BUN 8 6 - 20 MG/DL    Creatinine 0.61 0.55 - 1.02 MG/DL    BUN/Creatinine ratio 13 12 - 20      GFR est AA >60 >60 ml/min/1.73m2    GFR est non-AA >60 >60 ml/min/1.73m2    Calcium 7.7 (L) 8.5 - 10.1 MG/DL    Bilirubin, total 0.6 0.2 - 1.0 MG/DL    ALT (SGPT) 15 12 - 78 U/L    AST (SGOT) 16 15 - 37 U/L    Alk. phosphatase 61 45 - 117 U/L    Protein, total 6.3 (L) 6.4 - 8.2 g/dL    Albumin 3.1 (L) 3.5 - 5.0 g/dL    Globulin 3.2 2.0 - 4.0 g/dL    A-G Ratio 1.0 (L) 1.1 - 2.2     PROTHROMBIN TIME + INR    Collection Time: 18  5:38 AM   Result Value Ref Range    INR 1.0 0.9 - 1.1      Prothrombin time 10.5 9.0 - 11.1 sec       Imagin18 CT A/P:  1. Low-attenuation infiltrative mass in the head of the pancreas with  involvement of the superior mesenteric vessels and occlusion of the superior  mesenteric vein consistent with pancreatic carcinoma. 2. Peripancreatic adenopathy. 3. Lumbar spondylosis. 08 CT Chest:  There are 2 tiny nonspecific nodules in the lungs. Follow-up advised. Pancreatic head mass. Subtle hypodensity in the dome of the liver interposed between the right and  middle hepatic veins, nonspecific and not suitable for percutaneous biopsy.       Review of Systems: 12 point obtained and negative other than stated in hpi     Physical Exam:  Constitutional Alert, cooperative, oriented. . Well developed.    Head Normocephalic; no scars   Eyes Conjunctivae and sclerae are clear and without icterus. Pupils are reactive and equal.   ENMT Adentulous, no thrush   Neck Supple without masses or thyromegaly. No jugular venous distension. Hematologic/Lymphatic No petechiae or purpura. No tender or palpable lymph nodes in the cervical, supraclavicular, axillary or inguinal area. Respiratory Lungs are clear to auscultation without rhonchi or wheezing. Cardiovascular Regular rate and rhythm of heart without murmurs, gallops or rubs. Chest / Line Site Chest is symmetric with no chest wall deformities. Abdomen Non-tender, non-distended, no masses, ascites or hepatosplenomegaly. Good bowel sounds. No guarding or rebound tenderness. No pulsatile masses. Musculoskeletal No tenderness or swelling, normal range of motion without obvious weakness. Extremities No visible deformities, no cyanosis, clubbing or edema. Skin No rashes, scars, or lesions suggestive of malignancy. No petechiae, purpura, or ecchymoses. No excoriations. Neurologic  cranial nerves intact. Psychiatric Alert and oriented times three. Coherent speech. Verbalizes understanding of our discussions today.               Assessment and Plan:   Mrs Ann Loredo is a 81 yo female with pmh of ischemic CM who presents with pancreatic mass concerning for malignancy.    *) Pancreatic head mass:  CT scan shows pancreatic head mass with involvement of the superior mesenteric vessels and occlusion of the superior mesenteric vein, very concerning for advanced stage disease. CA 19-9 pending. EUS with bx done earlier today. Reviewed CT chest that didn't show any obvious disease, a few small nodules we need to follow in lungs. Awaiting path results, if positive (suspected) will order port placement and abd mri. Workup could be continued in outpt setting.         *) Ischemic CM with EF 20-25%:   - this may affect treatment options once dx has been established.

## 2018-06-21 NOTE — PROGRESS NOTES
Bedside and Verbal shift change report given to Amanda Arnett (oncoming nurse) by Carl Vides RN   (offgoing nurse). Report included the following information SBAR, Kardex, MAR and Recent Results.

## 2018-06-21 NOTE — PROGRESS NOTES
Bedside and Verbal shift change report given to Sofy Rose (oncoming nurse) by Yong Palencia (offgoing nurse). Report included the following information SBAR, Kardex, Procedure Summary, Intake/Output, MAR, Recent Results and Cardiac Rhythm paced.

## 2018-06-21 NOTE — ANESTHESIA PREPROCEDURE EVALUATION
Anesthetic History   No history of anesthetic complications            Review of Systems / Medical History  Patient summary reviewed, nursing notes reviewed and pertinent labs reviewed    Pulmonary  Within defined limits                 Neuro/Psych         Psychiatric history     Cardiovascular    Hypertension      CHF  Dysrhythmias   Pacemaker (Icd)    Exercise tolerance: <4 METS  Comments: Last echo showed EF 20-25%-pt currently denies any s/s of CHF   GI/Hepatic/Renal  Within defined limits              Endo/Other  Within defined limits           Other Findings   Comments: Pre-op diagnosis: mass in pancreas         Physical Exam    Airway  Mallampati: II  TM Distance: 4 - 6 cm  Neck ROM: normal range of motion   Mouth opening: Normal     Cardiovascular  Regular rate and rhythm,  S1 and S2 normal,  no murmur, click, rub, or gallop             Dental    Dentition: Edentulous     Pulmonary  Breath sounds clear to auscultation               Abdominal  GI exam deferred       Other Findings            Anesthetic Plan    ASA: 4  Anesthesia type: MAC            Anesthetic plan and risks discussed with: Patient and Spouse

## 2018-06-21 NOTE — PROGRESS NOTES
Hospitalist Progress Note    NAME: Zee Rosenbaum   :  1950   MRN:  [id number]       Assessment / Plan:  *Seen by GI and Hem Onc, appreciate [facility]   EUS with biopsy  2018  CT chest  with lung nodules, small liver hypodensity, not amenable to perc bx    Abd pain  Pancreatitis  Pancreatic mass  Hypotension secondary to diarrhea  -CT a/p showed Low-attenuation infiltrative mass in the head of the pancreas with  involvement of the superior mesenteric vessels and occlusion of the superior  mesenteric vein consistent with pancreatic carcinoma. Peripancreatic adenopathy   -diarrhea had been on going for several years. Her  states it started right after Xarelto was started. -pain control with IV morphine/norco prn  -gentle hydration due chronic heart failure with EF 20-25%  -NPO and plan for EUS in the AM  -Xarelto discontinued for 2 days now due to worsening of diarrhea  -will send stool studies  -Discussed case with Dr Karina Andrade    Diarrhea  -getting studies     NICM with EF 20-25% s/p biV pacemaker  Paroxysmal afib  -hold antihypertensives due to hypotension  -cont' amiodarone  -hydralazine prn  -xarelto held for 2 days now per pt. I confirmed it with her         Code Status: Full  Surrogate Decision Maker: pt's   DVT Prophylaxis: scds  GI Prophylaxis: not indicated     Subjective:     Chief Complaint / Reason for Physician Visit  cofortable      Review of Systems:  Symptom Y/N Comments  Symptom Y/N Comments   Fever/Chills    Chest Pain     Poor Appetite    Edema     Cough    Abdominal Pain     Sputum    Joint Pain     SOB/YANEZ    Pruritis/Rash     Nausea/vomit    Tolerating PT/OT     Diarrhea    Tolerating Diet     Constipation    Other       Could NOT obtain due to:      Objective:     VITALS:   Last 24hrs VS reviewed since prior progress note.  Most recent are:  Patient Vitals for the past 24 hrs:   Temp Pulse Resp BP SpO2   18 1559 98.9 °F (37.2 °C) 68 16 124/57 97 % 06/21/18 1304 - 66 15 - 98 %   06/21/18 1302 - 68 15 121/63 99 %   06/21/18 1300 - 65 16 114/67 98 %   06/21/18 1258 - 72 18 127/73 99 %   06/21/18 1252 - 64 14 122/59 99 %   06/21/18 1251 - 66 21 120/58 99 %   06/21/18 1249 - 68 16 106/52 99 %   06/21/18 1248 98.8 °F (37.1 °C) 68 15 106/52 99 %   06/21/18 1247 - 60 20 106/52 96 %   06/21/18 1126 98.5 °F (36.9 °C) - - - -   06/21/18 1120 - 60 20 108/54 -   06/21/18 1115 98.2 °F (36.8 °C) 60 19 108/54 100 %   06/21/18 0742 98.4 °F (36.9 °C) 60 18 115/58 97 %   06/20/18 2307 98.7 °F (37.1 °C) (!) 57 16 103/65 95 %   06/20/18 2011 98.8 °F (37.1 °C) 67 16 98/46 95 %       Intake/Output Summary (Last 24 hours) at 06/21/18 1839  Last data filed at 06/21/18 1242   Gross per 24 hour   Intake          2358.75 ml   Output                0 ml   Net          2358.75 ml        PHYSICAL EXAM:  General: Lying comfortably, communicative and in good mood  EENT:  EOMI. Anicteric sclerae. MMM  Resp:  CTA bilaterally, no wheezing or rales. No accessory muscle use  CV:  Regular  rhythm,  No edema  GI:  Soft, Non distended, Non tender.  +Bowel sounds  Neurologic:  Alert and oriented X 3, normal speech,   Psych:   Good insight. Not anxious nor agitated  Skin:  No rashes. No jaundice    Reviewed most current lab test results and cultures  YES  Reviewed most current radiology test results   YES  Review and summation of old records today    NO  Reviewed patient's current orders and MAR    YES  PMH/SH reviewed - no change compared to H&P  ________________________________________________________________________  Care Plan discussed with:    Comments   Patient     Family      RN     Care Manager     Consultant                        Multidiciplinary team rounds were held today with , nursing, pharmacist and clinical coordinator. Patient's plan of care was discussed; medications were reviewed and discharge planning was addressed. ________________________________________________________________________  Total NON critical care TIME:  20   Minutes    Total CRITICAL CARE TIME Spent:   Minutes non procedure based      Comments   >50% of visit spent in counseling and coordination of care     ________________________________________________________________________  Charlene Rankin MD     Procedures: see electronic medical records for all procedures/Xrays and details which were not copied into this note but were reviewed prior to creation of Plan. LABS:  I reviewed today's most current labs and imaging studies.   Pertinent labs include:  Recent Labs      06/21/18   0538  06/20/18   0406  06/19/18   0415   WBC  6.3  6.5  5.9   HGB  10.5*  11.0*  11.1*   HCT  31.9*  33.6*  34.0*   PLT  232  249  249     Recent Labs      06/21/18   0538  06/20/18   0406  06/19/18   0415   NA  143  143  144   K  3.6  3.8  3.8   CL  114*  113*  110*   CO2  20*  22  25   GLU  112*  110*  110*   BUN  8  10  14   CREA  0.61  0.65  0.74   CA  7.7*  8.0*  8.4*   ALB  3.1*   --    --    TBILI  0.6   --    --    SGOT  16   --    --    ALT  15   --    --    INR  1.0   --    --        Signed: Charlene Rankin MD

## 2018-06-21 NOTE — ROUTINE PROCESS
Rock Gita  1950  [id number]    Situation:  Verbal report received from: Debi Hardy RN  Procedure: Procedure(s):  ENDOSCOPIC ULTRASOUND (EUS)  FINE NEEDLE ASPIRATION  ESOPHAGOGASTRODUODENOSCOPY (EGD)  INJECTION    Background:    Preoperative diagnosis: mass in pancreas  Postoperative diagnosis: pancreatic mass    :  Dr. Deborah Matthews  Assistant(s): Endoscopy Technician-1: Carola Smith  Endoscopy RN-1: Sri Pillai RN    Specimens: * No specimens in log *  H. Pylori  no    Assessment:  Intra-procedure medications     Anesthesia gave intra-procedure sedation and medications, see anesthesia flow sheet yes    Intravenous fluids: NS@ KVO     Vital signs stable       Abdominal assessment: round and soft       Recommendation:  Discharge patient per MD order  .   Return to floor yes    Family or Friend   here  Permission to share finding with family or friend yes

## 2018-06-21 NOTE — PROCEDURES
NAME:  Luis Oropeza   :   1950   MRN:   [id number]     [facility]    Date/Time:  2018   Procedure Type: EUS FNA pancreas mass   Indications: Pancreatic mass    Pre-operative Diagnosis: see indication above    Post-operative Diagnosis:  See findings below    : Svitlana Edouard MD    Referring Provider: Brunilda Moulton MD    Procedure Details:    Exam:  Airway: clear, no airway problems anticipated  Heart: RRR, without gallops or rubs  Lungs: clear bilaterally without wheezes, crackles, or rhonchi  Abdomen: soft, nontender, nondistended, bowel sounds present  Mental Status: awake, alert and oriented to person, place and time     Anethesia/Sedation:  MAC anesthesia Propofol      Procedure Details   After infom consent was obtained for the procedure, with all risks and benefits of procedure explained the patient was taken to the endoscopy suite and placed in the left lateral decubitus position. Following sequential administration of sedation as per above, the linear echoendoscope was inserted into the mouth and advanced under direct vision to second portion of the duodenum. A careful inspection was made as the gastroscope was withdrawn, including a retroflexed view of the proximal stomach; findings and interventions are described below. Findings:     Endoscopic:-normal esophagus, stomach, and duodenum    Ultrasound:     Esophagus: normal findings     Stomach: normal findings     Pancreas:       Areas examined: the entire gland      Parenchyma: A very large, irregular, mixed echogenicity, malignant appearing mass is noted in the head, over 3 cm in size. There are multiple collateral vessels. A large 2.24 cm kendall pancreatic malignant appearing lymph node is also noted close to the body. PD is dilated  and tortuous in the body.      A single FNA was performed via the bulb (best angle) using Color flow Doppler under ultrasound guidance, with no blood seen in the needle on suction. There was copious blood seen endoscopically with very poor views after the FNA was completed. This did not clear up on washings. Views were very limited. I immediatly pulled out the EUS scope and used an upper scope to evaluate the area. Laminar blood flow was noted at the FNA site. This washed and observed. As oozing continued I decided to inject 4 cc total( as 2 cc each) of 1:10,000 Epinephrine to the FNA site. Blanching was noted with control of bleeding. Mild oozing was noted at injection site/s suggesting coagulopathy/oozing diathesis(Pt had been on Xarelto recently). Total blood loss about 100 cc. Pancreatic Duct: dilated as above. Liver:     Parenchyma: normal, as much as could be seen. Complications: as above  EBL:  100 ml. Interventions: Fine needle aspirate  was performed of the pancreas head mass using a 22 gauge needle with 1 pass. Recommendations:     Observe for bleeding and follow hgb. Follow up path report. Nick De Guzman MD

## 2018-06-21 NOTE — PROGRESS NOTES
ONCOLOGY NURSE NAVIGATOR    Ginger Morales 79 yo    , son (\"Jacobo\") DIL (Amanda Weiss) 3 grandchildren    Dx: Pancreatic head mass    ONN introduced self and role, provided w/ contact information. EUS with bx 6/21. Awaiting Ca 19-9 results. Shares hx of heart disease, states has a defibrillator and pacemaker. Discussed diagnostic workup, verbalizes \"I just can't have cancer\"  Shared web sites for family to obtain information, to avoid google search. Financial: no barriers-Medicare+supplemental  Transportation: no barriers-lives in [place]: overwhelmed w/ possibility of cancer, supportive family, states wishes son/DIL to be involved in care/decision making     Provided w/ [employer]. Discussed Advanced Directive and explained importance of [address]. Advised that could assist w/ implementing while hospitalized.      Eduarda Arteaga RN

## 2018-06-22 NOTE — PROGRESS NOTES
Spoke with Juno Cancino at [facility]; earliest appointment dated 07/26/18 at 21 [phone] with Dr. Tracie Last; appointment scheduled and updated in patient's discharge paperwork. [id number]  Discharge instructions reviewed with patient; all questions and concerns addressed; IV and tele box removed; no prescriptions sent home with patient.

## 2018-06-22 NOTE — PROGRESS NOTES
All in agreement with d/c to home without d/c needs. At present time, he has refused HHC.    D/C plans discussed with MD and staff

## 2018-06-22 NOTE — PROGRESS NOTES
Gastroenterology Progress Note    6/22/2018    Admit Date: 6/18/2018    Subjective: Follow up for:  1)  Pancreatic mass    2) bleeding after eus         Pain: Patient complains of abdominal pain no. There is no bleeding  No black or bloody stools             Current Facility-Administered Medications   Medication Dose Route Frequency    folic acid (FOLVITE) tablet 1 mg  1 mg Oral DAILY    sodium chloride (NS) flush 5-10 mL  5-10 mL IntraVENous Q8H    sodium chloride (NS) flush 5-10 mL  5-10 mL IntraVENous PRN    0.9% sodium chloride infusion  75 mL/hr IntraVENous CONTINUOUS    acetaminophen (TYLENOL) tablet 650 mg  650 mg Oral Q4H PRN    naloxone (NARCAN) injection 0.4 mg  0.4 mg IntraVENous PRN    HYDROcodone-acetaminophen (NORCO) 5-325 mg per tablet 1 Tab  1 Tab Oral Q4H PRN    ondansetron (ZOFRAN) injection 4 mg  4 mg IntraVENous Q4H PRN    bisacodyl (DULCOLAX) suppository 10 mg  10 mg Rectal DAILY PRN    amiodarone (CORDARONE) tablet 200 mg  200 mg Oral DAILY    sertraline (ZOLOFT) tablet 50 mg  50 mg Oral QPM    hydrALAZINE (APRESOLINE) 20 mg/mL injection 10 mg  10 mg IntraVENous Q6H PRN    morphine injection 1 mg  1 mg IntraVENous Q4H PRN        Objective:     Blood pressure 107/57, pulse 60, temperature 98.3 °F (36.8 °C), resp. rate 18, height 5' 1\" (1.549 m), weight 70.2 kg (154 lb 12.8 oz), SpO2 97 %. 06/20 1901 - 06/22 0700  In: 2950 [I.V.:2950]  Out: 100 [Urine:100]    EXAM:    Gen:  nad  Alert, oriented  Bs+, non tender. Data Review  No results found for this or any previous visit (from the past 24 hour(s)).   Recent Labs      06/21/18   0538  06/20/18   0406   WBC  6.3  6.5   HGB  10.5*  11.0*   HCT  31.9*  33.6*   PLT  232  249     Recent Labs      06/21/18   0538  06/20/18   0406   NA  143  143   K  3.6  3.8   CL  114*  113*   CO2  20*  22   BUN  8  10   CREA  0.61  0.65   GLU  112*  110*   CA  7.7*  8.0*     Recent Labs      06/21/18   0538   SGOT  16   ALT 15   AP  61   TBILI  0.6   TP  6.3*   ALB  3.1*   GLOB  3.2     Recent Labs      06/21/18   0538   INR  1.0   PTP  10.5      Recent Labs      06/21/18   0538  06/20/18   0406   TIBC  345   --    PSAT  19*   --    FERR   --   74      No results found for: FOL, RBCF   No results for input(s): PH, PCO2, PO2 in the last 72 hours. No results for input(s): CPK, CKNDX, TROIQ in the last 72 hours. No lab exists for component: CPKMB  Lab Results   Component Value Date/Time    Cholesterol, total 145 06/19/2018 04:15 AM    HDL Cholesterol 84 06/19/2018 04:15 AM    LDL, calculated 42.4 06/19/2018 04:15 AM    Triglyceride 93 06/19/2018 04:15 AM    CHOL/HDL Ratio 1.7 06/19/2018 04:15 AM     No results found for: [facility]  Lab Results   Component Value Date/Time    Color YELLOW/STRAW 06/18/2018 01:26 PM    Appearance CLEAR 06/18/2018 01:26 PM    Specific gravity 1.009 06/18/2018 01:26 PM    pH (UA) 7.0 06/18/2018 01:26 PM    Protein NEGATIVE  06/18/2018 01:26 PM    Glucose NEGATIVE  06/18/2018 01:26 PM    Ketone NEGATIVE  06/18/2018 01:26 PM    Bilirubin NEGATIVE  06/18/2018 01:26 PM    Urobilinogen 1.0 06/18/2018 01:26 PM    Nitrites NEGATIVE  06/18/2018 01:26 PM    Leukocyte Esterase NEGATIVE  06/18/2018 01:26 PM    Epithelial cells FEW 06/18/2018 01:26 PM    Bacteria 1+ (A) 06/18/2018 01:26 PM    WBC 0-4 06/18/2018 01:26 PM    RBC 0-5 06/18/2018 01:26 PM         Ca 19-9  Is 2406   This is strongly suggestive of pancreatic cancer. Assessment:     Active Problems:    Pancreatic mass (6/18/2018)    bx not definitive    Plan:     She will need repeat eus with bx and fna. (if oncology need more data to treat).   It is not a surgicaly resectable cancer  This is most likely a cancer by eus criteria and ca 19-9    Call partners to see on request this weekend 6.23 and 6.24    Demetrius Allen MD  8:54 AM  6/22/2018

## 2018-06-22 NOTE — TELEPHONE ENCOUNTER
Patient's  called in and stated that his wife is in the hospital with stomach issues and a mass on pancreas. Patient's  wanted to let Dr Figueroa Don know that they have taken her off the Xarelto.   Phone [phone]  Regulo Valadez

## 2018-06-22 NOTE — DISCHARGE SUMMARY
Physician Discharge Summary     Pt Name  Martinez Reddy date:  6/18/2018   Discharge date and time:  6/22/2018 10:39 AM   Room Number  3259/01    Medical Record Number  [id number] @ [facility]   Age  80 y.o. Date of Birth 1950   PCP Jeny Wilson MD     Admission Diagnoses:                        Pancreatic mass     Hospital Problems  Date Reviewed: 5/29/2018          Codes Class Noted POA    * (Principal)Pancreatic mass ICD-10-CM: K86.9  ICD-9-CM: 577.9  6/18/2018 Unknown               No Known Allergies     Excerpt from HPI :   Trinidad Tirado is a 80 y.o.  female with PMHx significant for NICM with EF 20-25%, s/p bIV pacemaker, PAF, present to the ED from PCP for further evaluation of hypotension. Pt was at her PCP's office today for evaluation RLQ and epigastric abd pain with worsening of chronic diarrhea. She was found hypotensive there and was transferred to the ED for further evaluation. Pt states her diarrhea started several years ago at around the same time xarelto was started. She denies any past evaluation by GI. Associated symptoms including wt loss over the last few months. Pt denies any associated fever, chills, cp, palpitations, n/v. In the ER, vitals: T98.1, P 63, /71, SpO2 100% on RA  Labs: lipase 1443  CT a/p showed Low-attenuation infiltrative mass in the head of the pancreas with  involvement of the superior mesenteric vessels and occlusion of the superior  mesenteric vein consistent with pancreatic carcinoma. Peripancreatic adenopathy     Hospital Course: This pt was admitted with  Pancreatic mass on 6/18/2018. *Seen by GI and Hem Onc, appreciate [facility]   EUS with biopsy  6.21.2018  CT chest 6/20 with lung nodules, small liver hypodensity, not amenable to perc bx  Prelim FNA : 'acinar and ductal cells' , not conclusive.  Needs stop xarelto x 5 days, repeat EUS FNA     Abd pain  Pancreatitis  Pancreatic mass  Hypotension secondary to diarrhea  -CT a/p showed Low-attenuation infiltrative mass in the head of the pancreas with  involvement of the superior mesenteric vessels and occlusion of the superior  mesenteric vein consistent with pancreatic carcinoma.  Peripancreatic adenopathy   -diarrhea had been on going for several years. Aylin Rodriguez  states it started right after Xarelto was started. -pain control with IV morphine/norco prn  -gentle hydration due chronic heart failure with EF 20-25%  -NPO and plan for EUS in the AM  -Xarelto discontinued for 2 days now due to worsening of diarrhea  -will send stool studies  -Discussed case with Dr Sergio Frey     Diarrhea  -getting studies      NICM with EF 20-25% s/p biV pacemaker  Paroxysmal afib  -hold antihypertensives due to hypotension  -cont' amiodarone  -hydralazine prn  -xarelto held for 2 days now per pt.  I confirmed it with her           Code Status: Full  Surrogate Decision Maker: pt's   DVT Prophylaxis: scds  GI Prophylaxis: not indicated       Condition at the time of discharge improved and stable . Query:   Treatment team[de-identified] Treatment Team: Scribe: Nahomy Ratliff;  Consulting Provider: Niall Gordon MD; Utilization Review: Aicha Gaytan; Care Manager: DONTAE Rodríguez; Care Manager: Daquan Louis RN; Consulting Provider: Jadyn Santana MD       Other Pertinent data:   TODAY's CLINICAL FINDINGS:   Visit Vitals    /57 (BP 1 Location: Right arm, BP Patient Position: At rest)    Pulse 60    Temp 98.3 °F (36.8 °C)    Resp 18    Ht 5' 1\" (1.549 m)    Wt 70.2 kg (154 lb 12.8 oz)    SpO2 97%    BMI 29.25 kg/m2      Wt Readings from Last 10 Encounters:   06/21/18 70.2 kg (154 lb 12.8 oz)   06/18/18 70.8 kg (156 lb)   05/29/18 70.8 kg (156 lb)   03/13/18 73.9 kg (163 lb)   02/24/18 72.2 kg (159 lb 2.8 oz)   07/25/17 74.8 kg (165 lb)   06/19/17 75.8 kg (167 lb 3.2 oz)   03/06/17 78.5 kg (173 lb)   02/22/17 78.9 kg (174 lb)   02/13/17 80.3 kg (177 lb) Physical Exam:    Gen:  Well-developed, well-nourished, in no acute distress  HEENT:  Pink conjunctivae, PERRL, hearing intact to voice, moist mucous membranes  Neck:  Supple, without masses, thyroid non-tender  Resp:  No accessory muscle use, clear breath sounds without wheezes rales or rhonchi  Card:  No murmurs, normal S1, S2 without thrills, bruits or peripheral edema  Abd:  Soft, non-tender, non-distended, normoactive bowel sounds are present, no palpable organomegaly  Lymph:  No cervical adenopathy  Musc:  No cyanosis or clubbing  Skin:  No rashes or ulcers, skin turgor is good  Neuro:  Cranial nerves 3-12 are grossly intact,  strength is 5/5 bilaterally, dorsi / plantarflexion strength is 5/5 bilaterally, follows commands appropriately  Psych:  Alert with good insight. Oriented to person, place, and time       Disposition:Home. Diet: Regular Diet   Care Plan discussed with: Patient/Family   Follow up   Follow-up Information     Follow up With Details Comments [address], [address] [facility]      Chelsie Quintana MD Go on 7/26/2018 Follow-up appointment [address]  [address]  [phone]           Discharge Medication List as of 6/22/2018 10:28 AM      CONTINUE these medications which have NOT CHANGED    Details   Calcium-Cholecalciferol, D3, (CALCIUM 600 WITH VITAMIN D3) 600 mg(1,500mg) -400 unit cap Take 1 Tab by mouth daily. , Historical Med      alendronate (FOSAMAX) 70 mg tablet Take 1 Tab by mouth every seven (7) days. , Normal, Disp-12 Tab, R-3      spironolactone (ALDACTONE) 25 mg tablet Take 1 Tab by mouth daily. Indications: chronic heart failure, Normal, Disp-90 Tab, R-1      amiodarone (CORDARONE) 200 mg tablet Take 1 Tab by mouth daily. , Normal, Disp-90 Tab, R-1      carvedilol (COREG) 6.25 mg tablet Take 1 Tab by mouth two (2) times daily (with meals). , Normal, Disp-180 Tab, R-1 furosemide (LASIX) 20 mg tablet TAKE 1 TABLET BY MOUTH DAILY, Normal, Disp-90 Tab, R-1      sacubitril-valsartan (ENTRESTO) 24 mg/26 mg tablet Take 1 Tab by mouth two (2) times a day., Normal, Disp-180 Tab, R-1      sertraline (ZOLOFT) 50 mg tablet TAKE 1 TABLET BY MOUTH DAILY, NormalGeneric For:ZOLOFT 50MGDisp-90 Tab, R-4         STOP taking these medications       rivaroxaban (XARELTO) 20 mg tab tablet Comments:   Reason for Stopping:                  Significant Diagnostic Studies:   Recent Labs      06/21/18 0538  06/20/18   0406   WBC  6.3  6.5   HGB  10.5*  11.0*   HCT  31.9*  33.6*   PLT  232  249     Recent Labs      06/21/18 0538  06/20/18   0406   NA  143  143   K  3.6  3.8   CL  114*  113*   CO2  20*  22   BUN  8  10   CREA  0.61  0.65   GLU  112*  110*   CA  7.7*  8.0*     Recent Labs      06/21/18   0538   SGOT  16   ALT  15   AP  61   TBILI  0.6   TP  6.3*   ALB  3.1*   GLOB  3.2     Recent Labs      06/21/18   0538   INR  1.0   PTP  10.5      Recent Labs      06/21/18   0538  06/20/18   0406   TIBC  345   --    PSAT  19*   --    FERR   --   74      No results for input(s): PH, PCO2, PO2 in the last 72 hours. No results for input(s): CPK, CKMB in the last 72 hours. No lab exists for component: TROPONINI  No results found for: [address]        ________________________________________________________________________    [place] Time for face to face meeting with the pt and examination including care coordination with staff and chart review (>50% of total time listed here )  approx.  30 min]    ________________________________________________________________________    Chauncey Rae MD  6/22/2018

## 2018-06-22 NOTE — PROGRESS NOTES
GI note:  Pre steiner from FNA x 1 pathology(phone call) showed 'acinar and ductal cells' not conclusive for malignancy. She will need definitive biopsies off of Xarelto for 5+ days and re do EUS FNA as OP. CT/EUS very suggestive of a malignant mass.     SMA Jailene MD

## 2018-06-22 NOTE — PROGRESS NOTES
Bedside shift change report given to CLARENCE De La Rosa (oncoming nurse) by Ilia Olmstead (offgoing nurse). Report included the following information SBAR, Kardex, Intake/Output, MAR, Recent Results and Cardiac Rhythm V Paced.

## 2018-06-26 NOTE — Clinical Note
Reached out to patient 6/26 and 6/27/18. Patient declined speaking with NN and handed phone back to  on 6/27/18.  reminded of CARLEEN visit today at 1:30pm and states patient plans to attend.

## 2018-06-26 NOTE — PROGRESS NOTES
NN met briefly with patient's  (on PHI), Lance Go, who states patient does not feel well enough to speak with NN today. Mr. Philip Valderrama requested that NN call back at a later date for assessment. NN will attempt to meet with patient, via phone, on 6-27-18.     6-27-18: NN met briefly with patient and  via phone. Patient promptly handed phone back to , Lance Go, and patient politely declined speaking with NN today. Mr. Philip Valderrama reminded of CARLEEN visit this afternoon, at 1:30pm with Hussain Scott NP, and he states patient plans to attend as scheduled.

## 2018-06-27 NOTE — PROGRESS NOTES
Chief Complaint   Patient presents with    Transitions Of Care     1. Have you been to the ER, urgent care clinic since your last visit? Hospitalized since your last visit? Yes When: [facility]     2. Have you seen or consulted any other health care providers outside of the [facility] since your last visit? Include any pap smears or colon screening. No     Pt is currently on a liquid diet and would like to know when she can resume a full diet.

## 2018-06-27 NOTE — MR AVS SNAPSHOT
[address] [facility] 
 
 
 [address] 
[phone] Patient: Marvin Cardona MRN:  [id number] Visit Information Date & Time Provider Department Dept. Phone Encounter #  
 6/27/2018  1:30 PM Raphael Fagan, [address] [address] [phone] [id number] Follow-up Instructions Return in about 1 week (around 7/4/2018), or if symptoms worsen or fail to improve. Your Appointments 6/28/2018 10:45 AM  
PACEMAKER with [facility] (ANTHONY SCHEDULING) Appt Note: higinio sci BiV ICD/rc/Escobar 1 yr b   LAT  
 [address] [place]  
[address] [facility]  
  
    
 6/28/2018 11:00 AM  
ESTABLISHED PATIENT with Joselin Chaudhari MD  
[facility] ([address]) Appt Note: higinio sci BiV ICD/rc/Escobar 1 yr b   LAT  
 [address] [place]  
[address] [facility] [address] Upcoming Health Maintenance Date Due  
 MEDICARE YEARLY EXAM 7/26/2018 Influenza Age 5 to Adult 8/1/2018 FOBT Q 1 YEAR AGE 50-75 6/19/2019 BREAST CANCER SCRN MAMMOGRAM 8/22/2019 GLAUCOMA SCREENING Q2Y 9/14/2019 DTaP/Tdap/Td series (2 - Td) 3/13/2028 Allergies as of 6/27/2018  Review Complete On: 6/27/2018 By: Raphael Fagan NP No Known Allergies Current Immunizations  Reviewed on 2/24/2018 Name Date Pneumococcal Conjugate (PCV-13) 7/25/2017 Pneumococcal Polysaccharide (PPSV-23) 6/2/2016 Not reviewed this visit You Were Diagnosed With   
  
 Codes Comments Pancreatic mass    -  Primary ICD-10-CM: K86.9 ICD-9-CM: 577.9 Idiopathic cardiomyopathy ([facility] [address]     ICD-10-CM: I42.8 ICD-9-CM: 425. 4  Chronic systolic congestive heart failure, NYHA class 3 (HCC)     ICD-10-CM: I50.22 
 ICD-9-CM: 428.22, 428.0 PAF (paroxysmal atrial fibrillation) (HCC)     ICD-10-CM: I48.0 ICD-9-CM: 427.31 Vitals BP Pulse Temp Resp Height(growth percentile) Weight(growth percentile) 92/56 (BP 1 Location: Right arm, BP Patient Position: Sitting) 63 98.5 °F (36.9 °C) (Oral) 24 5' 1\" (1.549 m) 156 lb (70.8 kg) SpO2 BMI OB Status Smoking Status 95% 29.48 kg/m2 Postmenopausal Never Smoker Vitals History BMI and BSA Data Body Mass Index Body Surface Area  
 29.48 kg/m 2 1.75 m 2 Preferred Pharmacy Pharmacy Name Phone [facility] [address] [phone] Your Updated Medication List  
  
   
This list is accurate as of 6/27/18  3:19 PM.  Always use your most recent med list.  
  
  
  
  
 alendronate 70 mg tablet Commonly known as:  FOSAMAX Take 1 Tab by mouth every seven (7) days. amiodarone 200 mg tablet Commonly known as:  CORDARONE Take 1 Tab by mouth daily. CALCIUM 600 WITH VITAMIN D3 600 mg(1,500mg) -400 unit Cap Generic drug:  Calcium-Cholecalciferol (D3) Take 1 Tab by mouth daily. carvedilol 6.25 mg tablet Commonly known as:  Deleta Tao Take 1 Tab by mouth two (2) times daily (with meals). furosemide 20 mg tablet Commonly known as:  LASIX TAKE 1 TABLET BY MOUTH DAILY  
  
 sacubitril-valsartan 24-26 mg tablet Commonly known as:  ENTRESTO Take 1 Tab by mouth two (2) times a day. sertraline 50 mg tablet Commonly known as:  ZOLOFT  
TAKE 1 TABLET BY MOUTH DAILY  
  
 spironolactone 25 mg tablet Commonly known as:  ALDACTONE Take 1 Tab by mouth daily. Indications: chronic heart failure Follow-up Instructions Return in about 1 week (around 7/4/2018), or if symptoms worsen or fail to improve. Patient Instructions Pancreatic Cancer: Care Instructions Your Care Instructions Pancreatic cancer occurs when abnormal cells grow out of control in the pancreas. Your pancreas is in your belly, behind your stomach. It makes juices that help your body digest food. It also makes insulin, which helps control your blood sugar level. You may have a combination of treatments, depending on how advanced your cancer is. You may have surgery to take out part or all of your pancreas. Also, you may need radiation treatments or may take medicines that destroy cancer cells (chemotherapy). You may need to take medicines to help you digest food and control your blood sugar. If the cancer causes pain, your doctor will give you medicine or other treatment to help you be more comfortable. When you find out that you have cancer, you may feel many emotions and may need some help coping. Seek out family, friends, and counselors for support. You also can do things at home to make yourself feel better while you go through treatment. Call the [employer] (0-867.197.4132) or visit its website at [address] for more information. Follow-up care is a key part of your treatment and safety. Be sure to make and go to all appointments, and call your doctor if you are having problems. It's also a good idea to know your test results and keep a list of the medicines you take. How can you care for yourself at home? · Take your medicines exactly as prescribed. Call your doctor if you think you are having a problem with your medicine. You may get medicine for nausea and vomiting if you have these side effects. · Eat healthy food. If you do not feel like eating, try to eat food that has protein and extra calories to keep up your strength and prevent weight loss. Drink liquid meal replacements for extra calories and protein. Try to eat your main meal early. · Get some physical activity every day, but do not get too tired. Keep doing the hobbies you enjoy as your energy allows. · Take steps to control your stress and workload. Learn relaxation techniques. ¨ Share your feelings. Stress and tension affect our emotions. By expressing your feelings to others, you may be able to understand and cope with them. ¨ Consider joining a support group. Talking about a problem with your spouse, a good friend, or other people with similar problems is a good way to reduce tension and stress. ¨ Express yourself through art. Try writing, crafts, dance, or art to relieve stress. Some dance, writing, or art groups may be available just for people who have cancer. ¨ Be kind to your body and mind. Getting enough sleep, eating a healthy diet, and taking time to do things you enjoy can contribute to an overall feeling of balance in your life and help reduce stress. ¨ Get help if you need it. Discuss your concerns with your doctor or counselor. · If you are vomiting or have diarrhea: ¨ Drink plenty of fluids (enough so that your urine is light yellow or clear like water) to prevent dehydration. Choose water and other caffeine-free clear liquids. If you have kidney, heart, or liver disease and have to limit fluids, talk with your doctor before you increase the amount of fluids you drink. ¨ When you are able to eat, try clear soups, mild foods, and liquids until all symptoms are gone for 12 to 48 hours. Other good choices include dry toast, crackers, cooked cereal, and gelatin dessert, such as Jell-O. · If you have not already done so, prepare a list of advance directives. Advance directives are instructions to your doctor and family members about what kind of care you want if you become unable to speak or express yourself. When should you call for help? Call 911 anytime you think you may need emergency care. For example, call if: 
? · You passed out (lost consciousness). ?Call your doctor now or seek immediate medical care if: 
? · You have a fever. ? · You have abnormal bleeding. ? · You have new or worse pain. ? · You think you have an infection. ? · You have new symptoms, such as a cough, belly pain, vomiting, diarrhea, or a rash. ? Watch closely for changes in your health, and be sure to contact your doctor if: 
? · You are much more tired than usual.  
? · You have swollen glands in your armpits, groin, or neck. ? · You do not get better as expected. Where can you learn more? Go to http://john-armin.info/. Enter F591 in the search box to learn more about \"Pancreatic Cancer: Care Instructions. \" Current as of: May 12, 2017 Content Version: 11.4 © 1302-5437 [employer]. Care instructions adapted under license by [employer] (which disclaims liability or warranty for this information). If you have questions about a medical condition or this instruction, always ask your healthcare professional. [address] any warranty or liability for your use of this information. Introducing [facility]! Dear Bertha Dean: Thank you for requesting a [employer] account. Our records indicate that you already have an active [employer] account. You can access your account anytime at [email or web address] Did you know that you can access your hospital and ER discharge instructions at any time in [employer]? You can also review all of your test results from your hospital stay or ER visit. Additional Information If you have questions, please visit the Frequently Asked Questions section of the [employer] website at [email or web address] Remember, [employer] is NOT to be used for urgent needs. For medical emergencies, dial 911. Now available from your iPhone and Android! Please provide this summary of care documentation to your next provider. Your primary care clinician is listed as Edna Bloch. If you have any questions after today's visit, please call [phone].

## 2018-06-27 NOTE — PATIENT INSTRUCTIONS
Pancreatic Cancer: Care Instructions  Your Care Instructions    Pancreatic cancer occurs when abnormal cells grow out of control in the pancreas. Your pancreas is in your belly, behind your stomach. It makes juices that help your body digest food. It also makes insulin, which helps control your blood sugar level. You may have a combination of treatments, depending on how advanced your cancer is. You may have surgery to take out part or all of your pancreas. Also, you may need radiation treatments or may take medicines that destroy cancer cells (chemotherapy). You may need to take medicines to help you digest food and control your blood sugar. If the cancer causes pain, your doctor will give you medicine or other treatment to help you be more comfortable. When you find out that you have cancer, you may feel many emotions and may need some help coping. Seek out family, friends, and counselors for support. You also can do things at home to make yourself feel better while you go through treatment. Call the "[employer] (9-187.824.2991) or visit its website at [employer] for more information. Follow-up care is a key part of your treatment and safety. Be sure to make and go to all appointments, and call your doctor if you are having problems. It's also a good idea to know your test results and keep a list of the medicines you take. How can you care for yourself at home? · Take your medicines exactly as prescribed. Call your doctor if you think you are having a problem with your medicine. You may get medicine for nausea and vomiting if you have these side effects. · Eat healthy food. If you do not feel like eating, try to eat food that has protein and extra calories to keep up your strength and prevent weight loss. Drink liquid meal replacements for extra calories and protein. Try to eat your main meal early. · Get some physical activity every day, but do not get too tired.  Keep doing the hobbies you enjoy as your energy allows. · Take steps to control your stress and workload. Learn relaxation techniques. ¨ Share your feelings. Stress and tension affect our emotions. By expressing your feelings to others, you may be able to understand and cope with them. ¨ Consider joining a support group. Talking about a problem with your spouse, a good friend, or other people with similar problems is a good way to reduce tension and stress. ¨ Express yourself through art. Try writing, crafts, dance, or art to relieve stress. Some dance, writing, or art groups may be available just for people who have cancer. ¨ Be kind to your body and mind. Getting enough sleep, eating a healthy diet, and taking time to do things you enjoy can contribute to an overall feeling of balance in your life and help reduce stress. ¨ Get help if you need it. Discuss your concerns with your doctor or counselor. · If you are vomiting or have diarrhea:  ¨ Drink plenty of fluids (enough so that your urine is light yellow or clear like water) to prevent dehydration. Choose water and other caffeine-free clear liquids. If you have kidney, heart, or liver disease and have to limit fluids, talk with your doctor before you increase the amount of fluids you drink. ¨ When you are able to eat, try clear soups, mild foods, and liquids until all symptoms are gone for 12 to 48 hours. Other good choices include dry toast, crackers, cooked cereal, and gelatin dessert, such as Jell-O.  · If you have not already done so, prepare a list of advance directives. Advance directives are instructions to your doctor and family members about what kind of care you want if you become unable to speak or express yourself. When should you call for help? Call 911 anytime you think you may need emergency care. For example, call if:  ? · You passed out (lost consciousness). ?Call your doctor now or seek immediate medical care if:  ? · You have a fever.    ? · You have abnormal bleeding. ? · You have new or worse pain. ? · You think you have an infection. ? · You have new symptoms, such as a cough, belly pain, vomiting, diarrhea, or a rash. ? Watch closely for changes in your health, and be sure to contact your doctor if:  ? · You are much more tired than usual.   ? · You have swollen glands in your armpits, groin, or neck. ? · You do not get better as expected. Where can you learn more? Go to http://john-armin.info/. Enter L619 in the search box to learn more about \"Pancreatic Cancer: Care Instructions. \"  Current as of: May 12, 2017  Content Version: 11.4  © 8544-1714 [employer]. Care instructions adapted under license by [employer] (which disclaims liability or warranty for this information). If you have questions about a medical condition or this instruction, always ask your healthcare professional. [place] [address] any warranty or liability for your use of this information.

## 2018-06-27 NOTE — PROGRESS NOTES
Subjective:     Chief Complaint   Patient presents with    Transitions Of Care        HPI:  Ms. Storm Rodríguez is a 80y.o. year old female, she is seen today for Transition of Care services following a hospital discharge. [facility] and discharged on 6/22/18  Our office Nurse Navigator performed an outreach to Ms. Shiela Liang on 6/26/18 (within 2 business days of discharge) to complete medication reconciliation and a telephonic assessment of her condition. Excerpt from hospital HPI :   Ana Lilia Rowan is a 80 y.o.   female with PMHx significant for NICM with EF 20-25%, s/p bIV pacemaker, PAF, present to the ED from PCP for further evaluation of hypotension.  Pt was at her PCP's office today for evaluation RLQ and epigastric abd pain with worsening of chronic diarrhea.  She was found hypotensive there and was transferred to the ED for further evaluation.  Pt states her diarrhea started several years ago at around the same time xarelto was started. Omkar Marmolejo denies any past evaluation by GI.  Associated symptoms including wt loss over the last few months.  Pt denies any associated fever, chills, cp, palpitations, n/v.    In the ER, vitals: T98.1, P 63, /71, SpO2 100% on RA  Labs: lipase 1443  CT a/p showed Low-attenuation infiltrative mass in the head of the pancreas with  involvement of the superior mesenteric vessels and occlusion of the superior  mesenteric vein consistent with pancreatic carcinoma.  Peripancreatic adenopathy      Hospital Course: This pt was admitted with  Pancreatic mass on 6/18/2018. *Seen by GI and Hem Onc, appreciate [facility]   EUS with biopsy  6.21.2018  CT chest 6/20 with lung nodules, small liver hypodensity, not amenable to perc bx  Prelim FNA : 'acinar and ductal cells' , not conclusive.  Needs stop xarelto x 5 days, repeat EUS FNA      Abd pain  Pancreatitis  Pancreatic mass  Hypotension secondary to diarrhea  -CT a/p showed Low-attenuation infiltrative mass in the head of the pancreas with  involvement of the superior mesenteric vessels and occlusion of the superior  mesenteric vein consistent with pancreatic carcinoma.  Peripancreatic adenopathy   -diarrhea had been on going for several years. Clovis Pena  states it started right after Xarelto was started. -pain control with IV morphine/norco prn  -gentle hydration due chronic heart failure with EF 20-25%  -NPO and plan for EUS in the AM  -Xarelto discontinued for 2 days now due to worsening of diarrhea  -will send stool studies  -Discussed case with Dr Ethel Swift      Diarrhea  -getting studies      NICM with EF 20-25% s/p biV pacemaker  Paroxysmal afib  -hold antihypertensives due to hypotension  -cont' amiodarone  -hydralazine prn  -xarelto held for 2 days now per pt.  I confirmed it with her           Code Status: Full  Surrogate Decision Maker: pt's   DVT Prophylaxis: scds  GI Prophylaxis: not indicated            Imagin18 CT A/P:  1. Low-attenuation infiltrative mass in the head of the pancreas with  involvement of the superior mesenteric vessels and occlusion of the superior  mesenteric vein consistent with pancreatic carcinoma. 2. Peripancreatic adenopathy. 3. Lumbar spondylosis.      08 CT Chest:  There are 2 tiny nonspecific nodules in the lungs. Follow-up advised. Pancreatic head mass. Subtle hypodensity in the dome of the liver interposed between the right and  middle hepatic veins, nonspecific and not suitable for percutaneous biopsy. Per Dr Ethel Swift note when in hospital, \"She will need repeat eus with bx and fna. (if oncology need more data to treat). It is not a surgicaly resectable cancer  This is most likely a cancer by eus criteria and ca 19-9\". Family states that she went to [facility] yesterday another pancreatic biopsy done by Dr Michael Olson. Awaiting cytology results.   Per paperwork that patient was given yesterday:  Noted that she had \"normal esophagus, normal stomach, normal examined duodenum. A mass was identified in the pancreatic head and uncinate process of the pancreas. Fine needle aspiration performed. There was no toribio of significant pathology in the common bile duct. There was not evidence of significant pathology in the left lobe of the liver\".      Eating light diet. No nausea or vomiting. No SOB. Has not taken Xarelto, held due to need for biopsy but notes that she had been holding few days prior to that secondary to GI upset/side effects. She says the xarelto caused gas pain but she had been on the xarelto for about 2 years with GI side effects of gas and diarrhea and then the abdominal pain started about a month ago and progressively worsened until she was seen here by Christine Ashton and sent to ER. No hospital, ER or specialist visits since last primary care visit except as noted above. Past Medical History:   Diagnosis Date    Atrial fibrillation ([facility] [address]     Cardiomyopathy, nonischemic (HCC)     Congestive heart failure, unspecified     Depression     History of amiodarone therapy 12/30/2011    ICD (implantable cardiac defibrillator) in place 9/18/09    [facility]        Social History   Substance Use Topics    Smoking status: Never Smoker    Smokeless tobacco: Never Used    Alcohol use 2.5 oz/week     5 Cans of beer per week      Comment: occasional       Outpatient Prescriptions Marked as Taking for the 6/27/18 encounter (Office Visit) with Denise Connell NP   Medication Sig Dispense Refill    Calcium-Cholecalciferol, D3, (CALCIUM 600 WITH VITAMIN D3) 600 mg(1,500mg) -400 unit cap Take 1 Tab by mouth daily.  alendronate (FOSAMAX) 70 mg tablet Take 1 Tab by mouth every seven (7) days. 12 Tab 3    spironolactone (ALDACTONE) 25 mg tablet Take 1 Tab by mouth daily. Indications: chronic heart failure 90 Tab 1    amiodarone (CORDARONE) 200 mg tablet Take 1 Tab by mouth daily.  90 Tab 1    carvedilol (COREG) 6.25 mg tablet Take 1 Tab by mouth two (2) times daily (with meals). 180 Tab 1    furosemide (LASIX) 20 mg tablet TAKE 1 TABLET BY MOUTH DAILY 90 Tab 1    sacubitril-valsartan (ENTRESTO) 24 mg/26 mg tablet Take 1 Tab by mouth two (2) times a day. 180 Tab 1    sertraline (ZOLOFT) 50 mg tablet TAKE 1 TABLET BY MOUTH DAILY 90 Tab 4       No Known Allergies    Health Maintenance reviewed -      ROS:  Gen: no fatigue, no fever, no chills, no unexplained weight loss or weight gain  Eyes: no excessive tearing, itching, or discharge  Nose: no rhinorrhea, no sinus pain  Mouth: no oral lesions, no sore throat, no difficulty swallowing  Resp: no shortness of breath, no wheezing, no cough  CV: no chest pain, no orthopnea, no paroxysmal nocturnal dyspnea, no lower extremity edema, no palpitations  Abd: no nausea, no diarrhea, no constipation, no abdominal pain at this time but does have some intermittent abdominal discomfort. Neuro: no headaches, no syncope or presyncopal episodes  Endo: no polyuria, no polydipsia. : no hematuria, no dysuria, no frequency, no incontinence        PE:  Visit Vitals    BP 92/56 (BP 1 Location: Right arm, BP Patient Position: Sitting)    Pulse 63    Temp 98.5 °F (36.9 °C) (Oral)    Resp 24    Ht 5' 1\" (1.549 m)    Wt 156 lb (70.8 kg)    SpO2 95%    BMI 29.48 kg/m2     Gen: alert, oriented, no acute distress  Head: normocephalic, atraumatic  Eyes: pupils equal round reactive to light, sclera clear, conjunctiva clear  Oral: moist mucus membranes, no oral lesions, no pharyngeal inflammation or exudate  Neck: symmetric normal sized thyroid, no carotid bruits, no jugular vein distention  Resp: no increase work of breathing, lungs clear to ausculation bilaterally, no wheezing, rales or rhonchi  CV: S1, S2 normal.  No murmurs, rubs, or gallops. Abd: soft, not tender, not distended. No hepatosplenomegaly. Normal bowel sounds. No hernias.  No abdominal or renal bruits. Neuro: cranial nerves intact, normal strength and movement in all extremities, reflexes and sensation intact and symmetric. Skin: no lesion or rash  Extremities: no cyanosis or edema      Assessment/Plan:  Differential diagnosis and treatment options reviewed with patient who is in agreement with treatment plan as outlined below. ICD-10-CM ICD-9-CM    1. Hospital discharge follow-up Z09 V67.59    2. Pancreatic mass K86.9 577.9    3. Idiopathic cardiomyopathy (HCC) I42.8 425.4    4. Chronic systolic congestive heart failure, NYHA class 3 (HCC) I50.22 428.22      428.0    5. PAF (paroxysmal atrial fibrillation) ([facility]) I48.0 427.31      Awaiting cytology from biopsy done yesterday. Long discussion about her pending pathology and labs and imaging that was done while in hospital.  She did not have portacath inserted prior to discharge. She does not have any Heme/onc follow up scheduled as of now. Seems to be doing better, no abdominal pain at this time and no N/V/D. She is scheduled to see cardiology tomorrow. Will discuss with him regarding anticoagulation as she does not want to restart Eliquis. Will have her follow up one week to monitor progression of treatment plan and specialist follow up, may need assistance with making appointments/medication reconcilation. BP a little low today. She denies dizziness. Encouraged her to monitor home BP. May need to dose adjust some medications. cardiology to eval tomorrow. I have discussed the diagnosis with the patient and the intended plan as seen in the above orders. The patient has received an after-visit summary and questions were answered concerning future plans. I have discussed medication side effects and warnings with the patient as well. The patient verbalizes understanding and agreement with the plan.

## 2018-06-28 NOTE — PROGRESS NOTES
Subjective:       CARDIAC ELECTROPHYSIOLOGY CLINIC    Makayla Box is a 80 y.o. female who is here with her  for follow up of chronic heart failure. She has [employer] Biventricular pacemaker AICD    She is weaker and unsteady now  Recently she was known to have pancreatic mass and had biopsy done but result not available yet  She has met oncologist at [facility] and there was concern about LVEF being low in 2016  Her  is with her today as usual     She has paroxysmal atrial fibrillation, chronic idiopathic cardiomyopathy EF 30% in the past but last year in 2/2015 she had abnormal nuclear stress test with EF 31% and echo EF 38% she had cardiac cath with Dr Michael Hallman and she had normal coronary arteries and LVEF 25%. She had IVCD/LBBB type  She then had a new atrial lead removal (atrial lead noise known for a long time) and BIV ICD upgrade (new LV lead) 6/21/16. She takes xarelto for embolic CVA prevention.  She denies blood in the stool or urine      Cardiac testing   Cardiac cath 2/26/15 Normal coronaries, severely reduced LV function estimated EF 25%          [facility] cardiolite stress test 2/19/2015 showed atrial pacing, ventricular sensing with IVCD, diffuse nonspecific STT wave changes and PVCs  Myocardial perfusion images showed a large but partially reversible ischemia in anterior, anteroseptal and anterolateral walls consistent with ischemia in the LAD territory  LVEF 31%                           Problem List  Date Reviewed: 6/28/2018          Codes Class Noted    Pancreatic mass ICD-10-CM: K86.9  ICD-9-CM: 577.9  6/18/2018        Chronic systolic congestive heart failure, NYHA class 3 (HCC) ICD-10-CM: I50.22  ICD-9-CM: 428.22, 428.0  6/21/2016        Biventricular ICD (implantable cardioverter-defibrillator) in place ICD-10-CM: Z95.810  ICD-9-CM: V45.02  6/21/2016    Overview Addendum 2/22/2017  2:58 PM by Frances Wilkerson MD     Upgrade from [employer]  Right atrial lead extraction and reimplant 6/21/2016  RA lead dislodgment and replaced 2/22/2017             Left bundle branch block (LBBB) on electrocardiogram ICD-10-CM: I44.7  ICD-9-CM: 426.3  6/21/2016        Depression ICD-10-CM: F32.9  ICD-9-CM: 025  8/28/2015        Hypertension ICD-10-CM: I10  ICD-9-CM: 401.9  8/28/2015        History of amiodarone therapy ICD-10-CM: Z92.29  ICD-9-CM: V87.49  12/30/2011        Idiopathic cardiomyopathy ([facility] ICD-10-CM: I42.8  ICD-9-CM: 425.4  Unknown    Overview Addendum 2/7/2017  9:50 AM by Donna Lopez NP     Non ischemic by cath 3/15. EF 21% 11/16 by echo  2/6/17 Stress test showed appropriate sinus response but she had PVCs and 4 beat NSVT. Automatic implantable cardiac defibrillator in situ ICD-10-CM: Z95.810  ICD-9-CM: V45.02  Unknown    Overview Addendum 7/30/2012  8:51 AM by Roge Saucedo MD     ICD DFT test at 26 J converted VF to NSR successfully (she is on Amiodarone) 5/13/2011  Lead noises with isometric arm activity. Fluoroscopy 7/30/2012 does not show fracture of disconnection             Paroxysmal ventricular tachycardia (HCC) ICD-10-CM: I47.2  ICD-9-CM: 427.1  Unknown        PAF (paroxysmal atrial fibrillation) ([facility] ICD-10-CM: I48.0  ICD-9-CM: 427.31  Unknown    Overview Addendum 4/2/2011  3:42 PM by Roge Saucedo MD     ICD shock 3/28/2011 for rapid Afib  ICD shock 6/3/2010  (ICD with ATP for VT in the past)                 Current Outpatient Prescriptions   Medication Sig    Calcium-Cholecalciferol, D3, (CALCIUM 600 WITH VITAMIN D3) 600 mg(1,500mg) -400 unit cap Take 1 Tab by mouth daily.  alendronate (FOSAMAX) 70 mg tablet Take 1 Tab by mouth every seven (7) days.  spironolactone (ALDACTONE) 25 mg tablet Take 1 Tab by mouth daily. Indications: chronic heart failure    amiodarone (CORDARONE) 200 mg tablet Take 1 Tab by mouth daily.  carvedilol (COREG) 6.25 mg tablet Take 1 Tab by mouth two (2) times daily (with meals).     furosemide (LASIX) 20 mg tablet TAKE 1 TABLET BY MOUTH DAILY    sacubitril-valsartan (ENTRESTO) 24 mg/26 mg tablet Take 1 Tab by mouth two (2) times a day.  sertraline (ZOLOFT) 50 mg tablet TAKE 1 TABLET BY MOUTH DAILY     No current facility-administered medications for this visit. No Known Allergies  Past Medical History   Diagnosis Date    Congestive heart failure, unspecified     Cardiomyopathy, nonischemic     Atrial fibrillation     ICD (implantable cardiac defibrillator) in place 9/18/09     [facility]   [facility] of amiod[facility]one therapy 12/30/2011    ICD (implantable cardioverter-defibrillator) in place      Past Surgical History   Procedure Laterality Date    Hx heart catheterization  2006     no CAD, EF 25%    Echo 2d adult  4/1/2011     EF 30%, mild to moderate LA enlargement, mild MR    Ir fluoroscopy < 1 hour  7/30/2012          Hx pacemaker placement  2009    Hx pacemaker         History   Substance Use Topics    Smoking status: Never Smoker     Smokeless tobacco: Never Used    Alcohol Use: 2.5 oz/week     5 Cans of beer per week      Comment: occasional      Review of Systems    Constitutional: Negative for fever, chills, weight loss, + malaise/fatigue  HEENT: Negative for nosebleeds, and vision changes. Respiratory: Negative for cough, hemoptysis, sputum production, and wheezing. Cardiovascular: Negative for chest pain, palpitations, leg swelling, no syncope, and PND.  +YANEZ  Gastrointestinal: Negative for nausea, vomiting, blood in stool and melena. Genitourinary: Negative for hematuria. Musculoskeletal: Negative for myalgias. Skin: Negative for rash and itching. Heme: Does not bleed or bruise easily. Neurological: Negative for speech change and focal weakness.       Objective:     Visit Vitals    /70 (BP 1 Location: Left arm, BP Patient Position: Sitting)    Pulse 60    Ht 5' 1\" (1.549 m)    Wt 154 lb (69.9 kg)    BMI 29.1 kg/m2       Physical Exam:   General:  alert, cooperative, no distress, appears stated age   Neck: carotids upstroke normal bilaterally, no bruits, no JVD   Chest Wall: inspection showed normal left sided BIV ICD site   Lung: clear to auscultation bilaterally   Heart:  normal rate, regular rhythm,no rubs, clicks or gallops   Abdomen: soft, mild obesity. Extremities: no edema   Skin: left sided biventircular ICD site unremarkable, thin scar          Assessment/Plan:       ICD-10-CM ICD-9-CM    1. Presence of automatic cardioverter/defibrillator (AICD) Z95.810 V45.02 ECHO LIMITED      CANCELED: AMB POC EKG ROUTINE W/ 12 LEADS, INTER & REP   2. Idiopathic cardiomyopathy (HCC) I42.8 425.4 ECHO LIMITED      CANCELED: AMB POC EKG ROUTINE W/ 12 LEADS, INTER & REP   3. PAF (paroxysmal atrial fibrillation) (HCC) I48.0 427.31 ECHO LIMITED      CANCELED: AMB POC EKG ROUTINE W/ 12 LEADS, INTER & REP   4. On amiodarone therapy Z79.899 V58.69 ECHO LIMITED      CANCELED: AMB POC EKG ROUTINE W/ 12 LEADS, INTER & REP   5. Paroxysmal ventricular tachycardia (HCC) I47.2 427.1 ECHO LIMITED      CANCELED: AMB POC EKG ROUTINE W/ 12 LEADS, INTER & REP   6. CHF NYHA class II (symptoms with moderately strenuous activities), chronic, systolic (HCC) Z97.96 891.00 ECHO LIMITED     428.0 CANCELED: AMB POC EKG ROUTINE W/ 12 LEADS, INTER & REP   7. Chronic anticoagulation Z79.01 V58.61 ECHO LIMITED      CANCELED: AMB POC EKG ROUTINE W/ 12 LEADS, INTER & REP   8. Pancreatic mass K86.9 577.9    2016 echocardiogram showed EF between 20-25%.   She is pacing CRT from BIV ICD  Device check showed proper function  I discussed with her  and her about repeating 2 D echo for LVEF  If it is low, her prognosis and treatment of pancreatic cancer will be poor  I talked about possible ICD therapy off if she goes into hospice later  She is not yet getting biopsy result or seeing her oncologist back  She had come off anticoagulant for biopsy  Gait is not steady now and at risk of falling and ICH, no xarelto at this time    Future Appointments  Date Time Provider Chevy Sylvester   7/5/2018 9:30 AM Michael Neighbor, NP [address]   10/10/2018 10:00 AM Ryan PARMAR   1/16/2019 9:30 AM REMOTE1, [address]   4/24/2019 9:00 AM REMOTE1, Corby Gu M.D.  [facility]  Electrophysiology/Cardiology  [address] [facility] [place]  [facility], [address] [place] [address]  [phone]

## 2018-06-28 NOTE — MR AVS SNAPSHOT
[address] [facility] [address] [place] 
[phone] Patient: Davie Campbell MRN:  [id number] Visit Information Date & Time Provider Department Dept. Phone Encounter #  
 6/28/2018 11:00 AM Roge Saucedo MD [facility] [phone] [id number] Your Appointments 6/28/2018 12:00 PM  
ECHO CARDIOGRAMS 2D with Maisha Peck [facility] (ANTHONY SCHEDULING) Appt Note: limited echo per Dr Hannah Hurtado [address] [address]  
[address] [address]  
  
    
 7/5/2018  9:30 AM  
ROUTINE CARE with LEONA [address] ([address]) Appt Note: 1 week f/u $0cp [facility] [address], [address] [address]  
[address], [place] [address] [address]  
  
    
  
 10/10/2018 10:00 AM  
REMOTE OFFICE VISIT with Candy Rangel [facility] (ANTHONY SCHEDULING) Appt Note: Lat ICD/rc/b 6-28-18  
 [address] [address]  
[address] [place] [address] Street [address]  
  
    
 1/16/2019  9:30 AM  
REMOTE OFFICE VISIT with Candy Rangel [facility] (ANTHONY SCHEDULING) Appt Note: Lat ICD/rc  
 [address] [place]  
[phone]  
  
    
 4/24/2019  9:00 AM  
REMOTE OFFICE VISIT with Candy Rangel [facility] (ANTHONY SCHEDULING) Appt Note: Lat ICD/rc  
 [address] [place]  
[phone] Upcoming Health Maintenance Date Due  
 MEDICARE YEARLY EXAM 7/26/2018 Influenza Age 5 to Adult 8/1/2018 FOBT Q 1 YEAR AGE 50-75 6/19/2019 BREAST CANCER SCRN MAMMOGRAM 8/22/2019 GLAUCOMA SCREENING Q2Y 9/14/2019 DTaP/Tdap/Td series (2 - Td) 3/13/2028 Allergies as of 6/28/2018  Review Complete On: 6/28/2018 By: Fredo Valdes MD  
 No Known Allergies Current Immunizations  Reviewed on 2/24/2018 Name Date Pneumococcal Conjugate (PCV-13) 7/25/2017 Pneumococcal Polysaccharide (PPSV-23) 6/2/2016 Not reviewed this visit You Were Diagnosed With   
  
 Codes Comments Presence of automatic cardioverter/defibrillator (AICD)    -  Primary ICD-10-CM: Z95.810 ICD-9-CM: V45.02 Idiopathic cardiomyopathy ([address]     ICD-10-CM: I42.8 ICD-9-CM: 425.4 PAF (paroxysmal atrial fibrillation) ([facility])     ICD-10-CM: I48.0 ICD-9-CM: 427.31 On amiodarone therapy     ICD-10-CM: Z79.899 ICD-9-CM: V58.69 Paroxysmal ventricular tachycardia (HCC)     ICD-10-CM: I47.2 ICD-9-CM: 427.1 CHF NYHA class II (symptoms with moderately strenuous activities), chronic, systolic (HCC)     [facility]-11-PI: I50.22 ICD-9-CM: 428.22, 428.0 Chronic anticoagulation     ICD-10-CM: Z79.01 
ICD-9-CM: V58.61 Vitals BP Pulse Height(growth percentile) Weight(growth percentile) BMI OB Status 102/70 (BP 1 Location: Left arm, BP Patient Position: Sitting) 60 5' 1\" (1.549 m) 154 lb (69.9 kg) 29.1 kg/m2 Postmenopausal  
 Smoking Status Never Smoker Vitals History BMI and BSA Data Body Mass Index Body Surface Area  
 29.1 kg/m 2 1.73 m 2 Preferred Pharmacy Pharmacy Name Phone [facility] [phone] Your Updated Medication List  
  
   
This list is accurate as of 6/28/18 11:06 AM.  Always use your most recent med list.  
  
  
  
  
 alendronate 70 mg tablet Commonly known as:  FOSAMAX Take 1 Tab by mouth every seven (7) days. amiodarone 200 mg tablet Commonly known as:  CORDARONE Take 1 Tab by mouth daily. CALCIUM 600 WITH VITAMIN D3 600 mg(1,500mg) -400 unit Cap Generic drug:  Calcium-Cholecalciferol (D3) Take 1 Tab by mouth daily. carvedilol 6.25 mg tablet Commonly known as:  Tanya Salts Take 1 Tab by mouth two (2) times daily (with meals). furosemide 20 mg tablet Commonly known as:  LASIX TAKE 1 TABLET BY MOUTH DAILY  
  
 sacubitril-valsartan 24-26 mg tablet Commonly known as:  ENTRESTO Take 1 Tab by mouth two (2) times a day. sertraline 50 mg tablet Commonly known as:  ZOLOFT  
TAKE 1 TABLET BY MOUTH DAILY  
  
 spironolactone 25 mg tablet Commonly known as:  ALDACTONE Take 1 Tab by mouth daily. Indications: chronic heart failure We Performed the Following ECHO LIMITED [GEN2962 Custom] Introducing [facility]! Dear Hailey Valdez: Thank you for requesting a Listen Edition account. Our records indicate that you already have an active Listen Edition account. You can access your account anytime at [email or web address] Did you know that you can access your hospital and ER discharge instructions at any time in Listen Edition? You can also review all of your test results from your hospital stay or ER visit. Additional Information If you have questions, please visit the Frequently Asked Questions section of the Listen Edition website at [email or web address] Remember, Listen Edition is NOT to be used for urgent needs. For medical emergencies, dial 911. Now available from your iPhone and Android! Please provide this summary of care documentation to your next provider. Your primary care clinician is listed as Kassandra Ferro. If you have any questions after today's visit, please call [phone].

## 2018-06-29 NOTE — TELEPHONE ENCOUNTER
Pt's  contacted (checked disclosure.) Pt's  was calling asking about results if [facility] had them. Informed pt that [facility] had those results and [facility] has no way of seeing them. Instructed pt to contact the physician that did the biopsy and to send [facility] results. Pt's  verbalized understanding.

## 2018-07-05 NOTE — PROGRESS NOTES
Subjective:     Chief Complaint   Patient presents with    Results     breast biopsy        HPI:  Rekha Cottrell is a 80 y.o. female presents for follow up appointment regarding pancreatic mass biopsy. She still has not heard back regarding results of biopsy or plan of care. She says that she has \"not heard one thing from anyone and we have called several times\". She had a second pancreatic biopsy done on 6/26/18 outpatient at [facility].      Was seen by Dr Heron Hearn (cardiologist) on 6/28/18 for follow up:  \"2016 echocardiogram showed EF between 20-25%. She is pacing CRT from BIV ICD  Device check showed proper function  I discussed with her  and her about repeating 2 D echo for LVEF  If it is low, her prognosis and treatment of pancreatic cancer will be poor  I talked about possible ICD therapy off if she goes into hospice later  She is not yet getting biopsy result or seeing her oncologist back  She had come off anticoagulant for biopsy  Gait is not steady now and at risk of falling and ICH, no xarelto at this time\". 2DEcho done on 6/28/18 (she has not heard back from Dr Heron Hearn regarding these results yet). SUMMARY:  Left ventricle: The ventricle was mildly dilated. Systolic function was  severely reduced by EF (biplane method of disks). Ejection fraction was  estimated to be 30 %. There was severe diffuse hypokinesis. Wall thickness  was normal.  COMPARISONS:  Comparison was made with the previous study of 21-Nov-2016. LV overall  function has increased. She denies any nausea or vomiting but says that she has decreased appetite and still having upper abdominal pain, \"always there but sometimes its really painful and sometimes its dull\". She says she is having frequent bowel movements as well. Denies blood in her stool. No hospital, ER or specialist visits since last primary care visit except as noted above.     Past Medical History:   Diagnosis Date    Atrial fibrillation ([address]  Cardiomyopathy, nonischemic (HCC)     Congestive heart failure, unspecified     Depression     History of amiodarone therapy 12/30/2011    ICD (implantable cardiac defibrillator) in place 9/18/09    [facility]        Social History   Substance Use Topics    Smoking status: Never Smoker    Smokeless tobacco: Never Used    Alcohol use 2.5 oz/week     5 Cans of beer per week      Comment: occasional       Outpatient Prescriptions Marked as Taking for the 7/5/18 encounter (Office Visit) with Félix Mason NP   Medication Sig Dispense Refill    Calcium-Cholecalciferol, D3, (CALCIUM 600 WITH VITAMIN D3) 600 mg(1,500mg) -400 unit cap Take 1 Tab by mouth daily.  alendronate (FOSAMAX) 70 mg tablet Take 1 Tab by mouth every seven (7) days. 12 Tab 3    spironolactone (ALDACTONE) 25 mg tablet Take 1 Tab by mouth daily. Indications: chronic heart failure 90 Tab 1    amiodarone (CORDARONE) 200 mg tablet Take 1 Tab by mouth daily. 90 Tab 1    carvedilol (COREG) 6.25 mg tablet Take 1 Tab by mouth two (2) times daily (with meals). 180 Tab 1    furosemide (LASIX) 20 mg tablet TAKE 1 TABLET BY MOUTH DAILY 90 Tab 1    sacubitril-valsartan (ENTRESTO) 24 mg/26 mg tablet Take 1 Tab by mouth two (2) times a day.  180 Tab 1    sertraline (ZOLOFT) 50 mg tablet TAKE 1 TABLET BY MOUTH DAILY 90 Tab 4       No Known Allergies    Health Maintenance reviewed      ROS:  Gen: +fatigue, no fever, no chills,   Eyes: no excessive tearing, itching, or discharge  Nose: no rhinorrhea, no sinus pain  Mouth: no oral lesions, no sore throat, no difficulty swallowing  Resp: no shortness of breath, no wheezing, no cough  CV: no chest pain, no orthopnea, no paroxysmal nocturnal dyspnea, no lower extremity edema, no palpitations  Neuro: no headaches, no syncope or presyncopal episodes           PE:  Visit Vitals    BP (!) 85/51 (BP 1 Location: Right arm, BP Patient Position: Sitting)    Pulse 70    Temp 98 °F (36.7 °C)    Resp 16    Ht 5' 1\" (1.549 m)    Wt 150 lb (68 kg)    SpO2 94%    BMI 28.34 kg/m2     Gen: alert, oriented, no acute distress  Head: normocephalic, atraumatic  Ears: external auditory canals clear, TMs without erythema or effusion  Eyes: pupils equal round reactive to light, sclera clear, conjunctiva clear  Oral: moist mucus membranes, no oral lesions, no pharyngeal inflammation or exudate  Neck: symmetric normal sized thyroid, no carotid bruits, no jugular vein distention  Resp: no increase work of breathing, lungs clear to ausculation bilaterally, no wheezing, rales or rhonchi  CV: S1, S2 normal.  No murmurs, rubs, or gallops. Left sided ICD  Abd: soft, mild tender upper abdomen, not distended. Normal bowel sounds. Neuro: cranial nerves intact, normal strength and movement in all extremities,  Skin: no lesion or rash  Extremities: no cyanosis or edema      Assessment/Plan:  Differential diagnosis and treatment options reviewed with patient who is in agreement with treatment plan as outlined below. ICD-10-CM ICD-9-CM    1. Pancreatic mass K86.9 577.9 HYDROcodone-acetaminophen (NORCO) 5-325 mg per tablet   2. Upper abdominal pain R10.10 789.09 HYDROcodone-acetaminophen (NORCO) 5-325 mg per tablet   3. Idiopathic cardiomyopathy (HCC) I42.8 425.4      I called and spoke to Dr Rj Briones regarding her biopsy results. The biopsy that he did was essentially negative for cancerous cells but there is concern regarding these results and wonder if there is need for a 3rd biopsy. The biggest concern is what would be plan of care if biopsy confirmed cancer. Would she or could she even withstand cancer treatment as she is non surgical candidate due to her cardiac disease. Dr Rj Briones will call and discuss with oncology and will call patient back once he has spoken to her and will discuss plan of care at that time. For now, she requests something for pain when needed.   I agreed to start [facility] but long discussion regarding this medication and potential side effects. Will need to decide pain management plan as well. I have sent Dr Justin Escobar a message regarding hypotension, may need medication adjustment. She is asymptomatic today and will monitor her BP at home. I encouraged small, frequent bland diet meals and hydration. I have discussed the diagnosis with the patient and the intended plan as seen in the above orders. The patient has received an after-visit summary and questions were answered concerning future plans. I have discussed medication side effects and warnings with the patient as well. The patient verbalizes understanding and agreement with the plan.

## 2018-07-05 NOTE — MR AVS SNAPSHOT
[address] [facility] 
 
 
 [address] 
[phone] Patient: Ying Griffin MRN:  [id number] Visit Information Date & Time Provider Department Dept. Phone Encounter #  
 7/5/2018  9:30 AM LEONA [address] [facility] 071 [phone] [id number]  
  
 10/10/2018 10:00 AM  
REMOTE OFFICE VISIT with Teofilo Meyer [facility] (ANTHONY SCHEDULING) Appt Note: Lat ICD/rc/b 6-28-18  
 [address] [address]  
[address] [facility] [address]  
  
    
 1/16/2019  9:30 AM  
REMOTE OFFICE VISIT with Teofilo Meyer [facility] (ANTHONY SCHEDULING) Appt Note: Lat ICD/rc  
 [address] [place]  
[phone]  
  
    
 4/24/2019  9:00 AM  
REMOTE OFFICE VISIT with Teofilo Meyer [facility] (ANTHONY SCHEDULING) Appt Note: Lat ICD/rc  
 [address] [place]  
[phone] Upcoming Health Maintenance Date Due  
 MEDICARE YEARLY EXAM 7/26/2018 Influenza Age 5 to Adult 8/1/2018 FOBT Q 1 YEAR AGE 50-75 6/19/2019 BREAST CANCER SCRN MAMMOGRAM 8/22/2019 GLAUCOMA SCREENING Q2Y 9/14/2019 DTaP/Tdap/Td series (2 - Td) 3/13/2028 Allergies as of 7/5/2018  Review Complete On: 7/5/2018 By: Trung Song RN No Known Allergies Current Immunizations  Reviewed on 2/24/2018 Name Date Pneumococcal Conjugate (PCV-13) 7/25/2017 Pneumococcal Polysaccharide (PPSV-23) 6/2/2016 Not reviewed this visit You Were Diagnosed With   
  
 Codes Comments Pancreatic mass    -  Primary ICD-10-CM: K86.9 ICD-9-CM: 577.9 Upper abdominal pain     ICD-10-CM: R10.10 ICD-9-CM: 789.09 Vitals BP Pulse Temp Resp Height(growth percentile) Weight(growth percentile) (!) 85/51 (BP 1 Location: Right arm, BP Patient Position: Sitting) 70 98 °F (36.7 °C) 16 5' 1\" (1.549 m) 150 lb (68 kg) SpO2 BMI OB Status Smoking Status 94% 28.34 kg/m2 Postmenopausal Never Smoker Vitals History BMI and BSA Data Body Mass Index Body Surface Area  
 28.34 kg/m 2 1.71 m 2 Preferred Pharmacy Pharmacy Name Phone [facility], [address] [phone] Your Updated Medication List  
  
   
This list is accurate as of 7/5/18 10:10 AM.  Always use your most recent med list.  
  
  
  
  
 alendronate 70 mg tablet Commonly known as:  FOSAMAX Take 1 Tab by mouth every seven (7) days. amiodarone 200 mg tablet Commonly known as:  CORDARONE Take 1 Tab by mouth daily. CALCIUM 600 WITH VITAMIN D3 600 mg(1,500mg) -400 unit Cap Generic drug:  Calcium-Cholecalciferol (D3) Take 1 Tab by mouth daily. carvedilol 6.25 mg tablet Commonly known as:  Juline Balzarine Take 1 Tab by mouth two (2) times daily (with meals). furosemide 20 mg tablet Commonly known as:  LASIX TAKE 1 TABLET BY MOUTH DAILY HYDROcodone-acetaminophen 5-325 mg per tablet Commonly known as:  Whiteside Yan Take 1 Tab by mouth every eight (8) hours as needed for Pain. Max Daily Amount: 3 Tabs. sacubitril-valsartan 24-26 mg tablet Commonly known as:  ENTRESTO Take 1 Tab by mouth two (2) times a day. sertraline 50 mg tablet Commonly known as:  ZOLOFT  
TAKE 1 TABLET BY MOUTH DAILY  
  
 spironolactone 25 mg tablet Commonly known as:  ALDACTONE Take 1 Tab by mouth daily. Indications: chronic heart failure Prescriptions Printed Refills HYDROcodone-acetaminophen (NORCO) 5-325 mg per tablet 0 Sig: Take 1 Tab by mouth every eight (8) hours as needed for Pain. Max Daily Amount: 3 Tabs. Class: Print Route: Oral  
  
Patient Instructions Abdominal Pain: Care Instructions Your Care Instructions Abdominal pain has many possible causes. Some aren't serious and get better on their own in a few days. Others need more testing and treatment. If your pain continues or gets worse, you need to be rechecked and may need more tests to find out what is wrong. You may need surgery to correct the problem. Don't ignore new symptoms, such as fever, nausea and vomiting, urination problems, pain that gets worse, and dizziness. These may be signs of a more serious problem. Your doctor may have recommended a follow-up visit in the next 8 to 12 hours. If you are not getting better, you may need more tests or treatment. The doctor has checked you carefully, but problems can develop later. If you notice any problems or new symptoms, get medical treatment right away. Follow-up care is a key part of your treatment and safety. Be sure to make and go to all appointments, and call your doctor if you are having problems. It's also a good idea to know your test results and keep a list of the medicines you take. How can you care for yourself at home? · Rest until you feel better. · To prevent dehydration, drink plenty of fluids, enough so that your urine is light yellow or clear like water. Choose water and other caffeine-free clear liquids until you feel better. If you have kidney, heart, or liver disease and have to limit fluids, talk with your doctor before you increase the amount of fluids you drink. · If your stomach is upset, eat mild foods, such as rice, dry toast or crackers, bananas, and applesauce. Try eating several small meals instead of two or three large ones. · Wait until 48 hours after all symptoms have gone away before you have spicy foods, alcohol, and drinks that contain caffeine. · Do not eat foods that are high in fat. · Avoid anti-inflammatory medicines such as aspirin, ibuprofen (Advil, Motrin), and naproxen (Aleve). These can cause stomach upset.  Talk to your doctor if you take daily aspirin for another health problem. When should you call for help? Call 911 anytime you think you may need emergency care. For example, call if: 
? · You passed out (lost consciousness). ? · You pass maroon or very bloody stools. ? · You vomit blood or what looks like coffee grounds. ? · You have new, severe belly pain. ?Call your doctor now or seek immediate medical care if: 
? · Your pain gets worse, especially if it becomes focused in one area of your belly. ? · You have a new or higher fever. ? · Your stools are black and look like tar, or they have streaks of blood. ? · You have unexpected vaginal bleeding. ? · You have symptoms of a urinary tract infection. These may include: 
¨ Pain when you urinate. ¨ Urinating more often than usual. 
¨ Blood in your urine. ? · You are dizzy or lightheaded, or you feel like you may faint. ? Watch closely for changes in your health, and be sure to contact your doctor if: 
? · You are not getting better after 1 day (24 hours). Where can you learn more? Go to http://john-armin.info/. Enter U834 in the search box to learn more about \"Abdominal Pain: Care Instructions. \" Current as of: March 20, 2017 Content Version: 11.4 © 7700-5346 [employer]. Care instructions adapted under license by [employer] (which disclaims liability or warranty for this information). If you have questions about a medical condition or this instruction, always ask your healthcare professional. [place] [address] any warranty or liability for your use of this information. Introducing [facility]! Dear Shelli Salcedo: Thank you for requesting a [employer] Everything account. Our records indicate that you already have an active [employer] account. You can access your account anytime at [email or web address] Did you know that you can access your hospital and ER discharge instructions at any time in HumanAPI? You can also review all of your test results from your hospital stay or ER visit. Additional Information If you have questions, please visit the Frequently Asked Questions section of the HumanAPI website at [email or web address] Remember, HumanAPI is NOT to be used for urgent needs. For medical emergencies, dial 911. Now available from your iPhone and Android! Please provide this summary of care documentation to your next provider. Your primary care clinician is listed as Kristie Castillo. If you have any questions after today's visit, please call [phone].

## 2018-07-05 NOTE — TELEPHONE ENCOUNTER
Can you call patient and tell her to cut her coreg in half or if BP <855 systolic to hold her coreg.   Thanks

## 2018-07-05 NOTE — TELEPHONE ENCOUNTER
----- Message from Sun Matias MD sent at 7/5/2018  3:13 PM EDT -----  Regarding: RE: Hypotension  We can cut coreg to 3.125 or hold it        ----- Message -----     From: Gavin Lam NP     Sent: 7/5/2018  10:04 AM       To: Sun Matias MD  Subject: Hypotension                                      Hey Dr Beverly Jacinto,  I have seen Mrs Mushtaq Patel today in office for follow up regarding her pancreatic mass and her BP has been pretty low. She is asymptomatic in the office but did not know if you would like us to decrease any of her medication dosing to get this BP up a little?   Thanks so much  [employer]

## 2018-07-05 NOTE — PATIENT INSTRUCTIONS
Abdominal Pain: Care Instructions  Your Care Instructions    Abdominal pain has many possible causes. Some aren't serious and get better on their own in a few days. Others need more testing and treatment. If your pain continues or gets worse, you need to be rechecked and may need more tests to find out what is wrong. You may need surgery to correct the problem. Don't ignore new symptoms, such as fever, nausea and vomiting, urination problems, pain that gets worse, and dizziness. These may be signs of a more serious problem. Your doctor may have recommended a follow-up visit in the next 8 to 12 hours. If you are not getting better, you may need more tests or treatment. The doctor has checked you carefully, but problems can develop later. If you notice any problems or new symptoms, get medical treatment right away. Follow-up care is a key part of your treatment and safety. Be sure to make and go to all appointments, and call your doctor if you are having problems. It's also a good idea to know your test results and keep a list of the medicines you take. How can you care for yourself at home? · Rest until you feel better. · To prevent dehydration, drink plenty of fluids, enough so that your urine is light yellow or clear like water. Choose water and other caffeine-free clear liquids until you feel better. If you have kidney, heart, or liver disease and have to limit fluids, talk with your doctor before you increase the amount of fluids you drink. · If your stomach is upset, eat mild foods, such as rice, dry toast or crackers, bananas, and applesauce. Try eating several small meals instead of two or three large ones. · Wait until 48 hours after all symptoms have gone away before you have spicy foods, alcohol, and drinks that contain caffeine. · Do not eat foods that are high in fat. · Avoid anti-inflammatory medicines such as aspirin, ibuprofen (Advil, Motrin), and naproxen (Aleve).  These can cause stomach upset. Talk to your doctor if you take daily aspirin for another health problem. When should you call for help? Call 911 anytime you think you may need emergency care. For example, call if:  ? · You passed out (lost consciousness). ? · You pass maroon or very bloody stools. ? · You vomit blood or what looks like coffee grounds. ? · You have new, severe belly pain. ?Call your doctor now or seek immediate medical care if:  ? · Your pain gets worse, especially if it becomes focused in one area of your belly. ? · You have a new or higher fever. ? · Your stools are black and look like tar, or they have streaks of blood. ? · You have unexpected vaginal bleeding. ? · You have symptoms of a urinary tract infection. These may include:  ¨ Pain when you urinate. ¨ Urinating more often than usual.  ¨ Blood in your urine. ? · You are dizzy or lightheaded, or you feel like you may faint. ? Watch closely for changes in your health, and be sure to contact your doctor if:  ? · You are not getting better after 1 day (24 hours). Where can you learn more? Go to http://john-armin.info/. Enter M093 in the search box to learn more about \"Abdominal Pain: Care Instructions. \"  Current as of: March 20, 2017  Content Version: 11.4  © 8343-6018 [employer]. Care instructions adapted under license by [employer] (which disclaims liability or warranty for this information). If you have questions about a medical condition or this instruction, always ask your healthcare professional. [place] [address] any warranty or liability for your use of this information.

## 2018-07-05 NOTE — TELEPHONE ENCOUNTER
Patients  ( [facility] confirmed ) notified to cut wife's coreg dose in half. If systolic BP less than 541, hold coreg. He repeated instructions and verbalized understanding.

## 2018-07-10 NOTE — PROGRESS NOTES
NN met with patient's , Ephraim Prescott (on [address] dated dated 6-27-18), briefly via phone. Mr. Deniz Landeros states, \"I'm currently on the road and only have a minute to talk to you. \" Mr. Deniz Landeros states they have received the most recent biopsy results and states that no cancer was found; however he does not know the plan going forward and states his wife, \"does not want to be chopped up, no surgery, and she is too weak for chemo and radiation. \" NN left message for patient today as well. Discussed the above with Hugo Orellana NP and Hugo Orellana would like for NN to attempt to follow patient and reach patient/ via phone since patient was given prescription for 30 tablets of Norco 5-325, to be taken every 8 hours as needed, on 7/5/18. Patient will need pain management plan going forward. NN has been unable to reach patient, via phone, and has had only brief conversations with  Ephraim Prescott. No goals have been set for patient at this time. NN will continue to attempt to reach patient via phone and speak with pEhraim Prsecott as permitted.

## 2018-07-18 PROBLEM — C25.0 MALIGNANT NEOPLASM OF HEAD OF PANCREAS (HCC): Status: ACTIVE | Noted: 2018-01-01

## 2018-07-18 PROBLEM — Z51.5 HOSPICE CARE: Status: ACTIVE | Noted: 2018-01-01

## 2018-07-18 NOTE — TELEPHONE ENCOUNTER
Message from [facility] , , stated that [facility] gave her 6 months to live and right now she is taking 1 Asprin a day to thin her blood .  The pt's is having a upset stomach and having some nausea    callback: [phone]

## 2018-07-18 NOTE — TELEPHONE ENCOUNTER
Spoke to patients . He will call oncology about patients nausea tomorrow. He states he just wanted to keep us informed of her condition.

## 2018-07-18 NOTE — TELEPHONE ENCOUNTER
Spoke to patient's . She has decided to focus on comfort care. They were seen by oncology yesterday and discussed the options for her pancreatic cancer and elected hospice. Per , they have not yet heard from a hospice agency. Please call [facility] and find out if they have arranged the hospice service - we can help if needed.

## 2018-07-19 NOTE — PROGRESS NOTES
NN met with Madeline Law, At [facility] (ph: [phone] and [phone]), who states patient is scheduled for hospice admission on 7-19-18 between 10am - Andreea Marlon states she spoke with patient's , Mango Galaviz, today and gave him her contact information as [facility] and Mad[facility] Law encouraged Mr. Rowan to call her should he need assistance prior to tomorrow's hospice appointment. Madeline Law states Roxane Alexander will be patient's admitting nurse and she will have Roxane Alexander contact this NN after tomorrow's hospice admission, with update.

## 2018-07-20 NOTE — PROGRESS NOTES
Per Aleena Strickland, with At [facility], patient was admitted to hospice services this morning.

## 2023-04-13 NOTE — ED NOTES
Complaint of intermittent epistaxis since yesterday, a couple of hours this morning.   Pt is on blood thinners
Dr Yuliet Anne reviewed discharge instructions with the patient. The patient verbalized understanding. All questions and concerns were addressed. The patient declined a wheelchair and is discharged ambulatory in the care of family members with instructions and prescriptions in hand. Pt is alert and oriented x 4. Respirations are clear and unlabored.
See MDM for attending note

## 2023-05-16 NOTE — TELEPHONE ENCOUNTER
Patient was readmitted to the hospital 05/13/2023. Closing encounter.     Patient discharged 05/11/2023 Urinary tract infection without hematuria, site unspecified  Patient needs to follow up with Dr. Moreno.    Spoke with Ms. Timmy Floyd regarding her chest x-ray. She was advised that in comparison to previous x-ray, no changes in lead position are seen and no acute process is seen. The patient verbalized understanding and will call our office with any further questions or concerns.

## 2024-09-10 NOTE — PROGRESS NOTES
Chief Complaint   Patient presents with    Results     breast biopsy     1. Have you been to the ER, urgent care clinic since your last visit? Hospitalized since your last visit? No    2. Have you seen or consulted any other health care providers outside of the [address] [place] [address] since your last visit? Include any pap smears or colon screening. No [Declined] : declined [Alert] : alert [FreeTextEntry3] : Focused exam only (see below) per patient request: - Pink hypertrophic plaque with white vertical linear scar on L shoulder - Multiple light brown waxy stuck-on papules on R neck - Purpuric patch on R dorsal hand  - Xerosis of bilateral plantar feet; interweb spaces clear

## (undated) DEVICE — Device: Brand: SINGLE USE ASPIRATION NEEDLE

## (undated) DEVICE — STAIN INDIA INK IN NACL 10ML --

## (undated) DEVICE — BLOCK BITE ENDOSCP AD 21 MM W/ DIL BLU LF DISP

## (undated) DEVICE — TOWEL 4 PLY TISS 19X30 SUE WHT

## (undated) DEVICE — SOLIDIFIER MEDC 1200ML -- CONVERT TO 356117

## (undated) DEVICE — CATH IV AUTOGRD BC PNK 20GA 25 -- INSYTE

## (undated) DEVICE — NEONATAL-ADULT SPO2 SENSOR: Brand: NELLCOR

## (undated) DEVICE — MEDI-VAC YANK SUCT HNDL W/TPRD BULBOUS TIP: Brand: CARDINAL HEALTH

## (undated) DEVICE — BASIN EMSIS 16OZ GRAPHITE PLAS KID SHP MOLD GRAD FOR ORAL

## (undated) DEVICE — KENDALL RADIOLUCENT FOAM MONITORING ELECTRODE RECTANGULAR SHAPE: Brand: KENDALL

## (undated) DEVICE — SET ADMIN 16ML TBNG L100IN 2 Y INJ SITE IV PIGGY BK DISP

## (undated) DEVICE — SYR 3ML LL TIP 1/10ML GRAD --

## (undated) DEVICE — Device

## (undated) DEVICE — 1200 GUARD II KIT W/5MM TUBE W/O VAC TUBE: Brand: GUARDIAN

## (undated) DEVICE — NEEDLE HYPO 18GA L1.5IN PNK S STL HUB POLYPR SHLD REG BVL

## (undated) DEVICE — NEEDLE SCLERO 25GA L240CM OD0.51MM ID0.24MM EXTN L4MM SHTH

## (undated) DEVICE — ENDOSCOPIC ULTRASOUND FINE NEEDLE BIOPSY (FNB) DEVICE: Brand: ACQUIRE

## (undated) DEVICE — Z DISCONTINUED PER MEDLINE LINE GAS SAMPLING O2/CO2 LNG AD 13 FT NSL W/ TBNG FILTERLINE

## (undated) DEVICE — SYR 10ML LUER LOK 1/5ML GRAD --